# Patient Record
Sex: MALE | Race: WHITE | Employment: OTHER | URBAN - METROPOLITAN AREA
[De-identification: names, ages, dates, MRNs, and addresses within clinical notes are randomized per-mention and may not be internally consistent; named-entity substitution may affect disease eponyms.]

---

## 2017-07-11 LAB
ALT SERPL-CCNC: 85 IU/L (ref 8–45)
ANION GAP SERPL CALCULATED.3IONS-SCNC: 11 MMOL/L (ref 5–18)
AST SERPL-CCNC: 118 IU/L (ref 2–40)
BUN SERPL-MCNC: 26 MG/DL (ref 8–25)
CALCIUM SERPL-MCNC: 7.9 MG/DL (ref 8.5–10.5)
CHLORIDE SERPLBLD-SCNC: 96 MMOL/L (ref 98–110)
CO2 SERPL-SCNC: 23 MMOL/L (ref 21–31)
CREAT SERPL-MCNC: 1 MG/DL (ref 0.72–1.25)
DIFFERENTIAL: ABNORMAL
ERYTHROCYTE [DISTWIDTH] IN BLOOD BY AUTOMATED COUNT: 22.4 % (ref 11.5–15.5)
GFR SERPL CREATININE-BSD FRML MDRD: >60 ML/MIN/1.73M2
GLUCOSE SERPL-MCNC: 117 MG/DL (ref 65–100)
HCT VFR BLD AUTO: 31.5 % (ref 37–53)
HEMOGLOBIN: 9.7 G/DL (ref 13.5–17.5)
INR PPP: 1.3
MCH RBC QN AUTO: 22.5 PG (ref 26–35)
MCHC RBC AUTO-ENTMCNC: 30.8 G/DL (ref 32–36)
MCV RBC AUTO: 73 FL (ref 80–100)
PLATELET # BLD AUTO: 399 THOU/CU MM (ref 140–440)
POTASSIUM SERPL-SCNC: 4.5 MMOL/L (ref 3.5–5)
RBC # BLD AUTO: 4.31 MIL/CU MM (ref 4.3–5.9)
SODIUM SERPL-SCNC: 130 MMOL/L (ref 135–145)
WBC # BLD AUTO: 18.1 THOU/CU MM (ref 4.5–11)

## 2017-07-14 LAB
ALT SERPL-CCNC: 47 IU/L (ref 8–45)
ANION GAP SERPL CALCULATED.3IONS-SCNC: 6 MMOL/L (ref 5–18)
AST SERPL-CCNC: 39 IU/L (ref 2–40)
BUN SERPL-MCNC: 20 MG/DL (ref 8–25)
CALCIUM SERPL-MCNC: 7.4 MG/DL (ref 8.5–10.5)
CHLORIDE SERPLBLD-SCNC: 104 MMOL/L (ref 98–110)
CO2 SERPL-SCNC: 22 MMOL/L (ref 21–31)
CREAT SERPL-MCNC: 0.77 MG/DL (ref 0.72–1.25)
GFR SERPL CREATININE-BSD FRML MDRD: >60 ML/MIN/1.73M2
GLUCOSE SERPL-MCNC: 121 MG/DL (ref 65–100)
POTASSIUM SERPL-SCNC: 4.6 MMOL/L (ref 3.5–5)
SODIUM SERPL-SCNC: 132 MMOL/L (ref 135–145)

## 2017-07-15 LAB
ALT SERPL-CCNC: 76 IU/L (ref 8–45)
ANION GAP SERPL CALCULATED.3IONS-SCNC: 9 MMOL/L (ref 5–18)
AST SERPL-CCNC: 55 IU/L (ref 2–40)
BUN SERPL-MCNC: 18 MG/DL (ref 8–25)
CALCIUM SERPL-MCNC: 7.9 MG/DL (ref 8.5–10.5)
CHLORIDE SERPLBLD-SCNC: 106 MMOL/L (ref 98–110)
CO2 SERPL-SCNC: 20 MMOL/L (ref 21–31)
CREAT SERPL-MCNC: 0.74 MG/DL (ref 0.72–1.25)
DIFFERENTIAL: ABNORMAL
ERYTHROCYTE [DISTWIDTH] IN BLOOD BY AUTOMATED COUNT: 23.5 % (ref 11.5–15.5)
GFR SERPL CREATININE-BSD FRML MDRD: >60 ML/MIN/1.73M2
GLUCOSE SERPL-MCNC: 140 MG/DL (ref 65–100)
HCT VFR BLD AUTO: 35.1 % (ref 37–53)
HEMOGLOBIN: 10.8 G/DL (ref 13.5–14.7)
MCH RBC QN AUTO: 23.1 PG (ref 26–34)
MCHC RBC AUTO-ENTMCNC: 30.8 G/DL (ref 32–36)
MCV RBC AUTO: 75 FL (ref 80–100)
PLATELET # BLD AUTO: 336 THOU/CU MM (ref 140–440)
POTASSIUM SERPL-SCNC: 4.6 MMOL/L (ref 3.5–5)
RBC # BLD AUTO: 4.68 MIL/CU MM (ref 4.3–5.9)
SODIUM SERPL-SCNC: 135 MMOL/L (ref 135–145)
WBC # BLD AUTO: 15.5 THOU/CU MM (ref 4.5–11)

## 2017-07-18 LAB
DIFFERENTIAL: ABNORMAL
ERYTHROCYTE [DISTWIDTH] IN BLOOD BY AUTOMATED COUNT: 23.7 % (ref 11.5–15.5)
HCT VFR BLD AUTO: 33.1 % (ref 37–53)
HEMOGLOBIN: 10.3 G/DL (ref 13.5–17.5)
MCH RBC QN AUTO: 22.6 PG (ref 26–34)
MCHC RBC AUTO-ENTMCNC: 31.1 G/DL (ref 32–36)
MCV RBC AUTO: 73 FL (ref 80–100)
PLATELET # BLD AUTO: 269 THOU/CU MM (ref 140–440)
RBC # BLD AUTO: 4.56 MIL/CU MM (ref 4.3–5.9)
WBC # BLD AUTO: 14.8 THOU/CU MM (ref 4.5–11)

## 2017-07-19 LAB
ALT SERPL-CCNC: 60 IU/L (ref 8–45)
ANION GAP SERPL CALCULATED.3IONS-SCNC: 8 MMOL/L (ref 5–18)
AST SERPL-CCNC: 43 IU/L (ref 2–40)
BUN SERPL-MCNC: 29 MG/DL (ref 8–25)
CALCIUM SERPL-MCNC: 7.5 MG/DL (ref 8.5–10.5)
CHLORIDE SERPLBLD-SCNC: 102 MMOL/L (ref 98–110)
CO2 SERPL-SCNC: 20 MMOL/L (ref 21–31)
CREAT SERPL-MCNC: 0.81 MG/DL (ref 0.72–1.25)
GFR SERPL CREATININE-BSD FRML MDRD: >60 ML/MIN/1.73M2
GLUCOSE SERPL-MCNC: 114 MG/DL (ref 65–100)
POTASSIUM SERPL-SCNC: 4.8 MMOL/L (ref 3.5–5)
SODIUM SERPL-SCNC: 130 MMOL/L (ref 135–145)

## 2017-07-20 LAB
ALT SERPL-CCNC: 51 IU/L (ref 8–45)
ANION GAP SERPL CALCULATED.3IONS-SCNC: 6 MMOL/L (ref 5–18)
AST SERPL-CCNC: 29 IU/L (ref 2–40)
BUN SERPL-MCNC: 31 MG/DL (ref 8–25)
CALCIUM SERPL-MCNC: 7.5 MG/DL (ref 8.5–10.5)
CHLORIDE SERPLBLD-SCNC: 102 MMOL/L (ref 98–110)
CO2 SERPL-SCNC: 22 MMOL/L (ref 21–31)
CREAT SERPL-MCNC: 0.8 MG/DL (ref 0.72–1.25)
DIFFERENTIAL: ABNORMAL
ERYTHROCYTE [DISTWIDTH] IN BLOOD BY AUTOMATED COUNT: 23.8 % (ref 11.5–15.5)
GFR SERPL CREATININE-BSD FRML MDRD: >60 ML/MIN/1.73M2
GLUCOSE SERPL-MCNC: 111 MG/DL (ref 65–100)
HCT VFR BLD AUTO: 28.6 % (ref 37–53)
HEMOGLOBIN: 9.3 G/DL (ref 13.5–17.5)
MCH RBC QN AUTO: 22.8 PG (ref 26–34)
MCHC RBC AUTO-ENTMCNC: 32.5 G/DL (ref 32–36)
MCV RBC AUTO: 70 FL (ref 80–100)
PLATELET # BLD AUTO: 211 THOU/CU MM (ref 140–440)
POTASSIUM SERPL-SCNC: 4.9 MMOL/L (ref 3.5–5)
RBC # BLD AUTO: 4.08 MIL/CU MM (ref 4.3–5.9)
SODIUM SERPL-SCNC: 130 MMOL/L (ref 135–145)
WBC # BLD AUTO: 13.1 THOU/CU MM (ref 4.5–11)

## 2017-07-21 LAB
ALT SERPL-CCNC: 51 IU/L (ref 8–45)
ANION GAP SERPL CALCULATED.3IONS-SCNC: 10 MMOL/L (ref 5–18)
AST SERPL-CCNC: 42 IU/L (ref 2–40)
BUN SERPL-MCNC: 34 MG/DL (ref 8–25)
CALCIUM SERPL-MCNC: 7.5 MG/DL (ref 8.5–10.5)
CHLORIDE SERPLBLD-SCNC: 102 MMOL/L (ref 98–110)
CO2 SERPL-SCNC: 20 MMOL/L (ref 21–31)
CREAT SERPL-MCNC: 0.93 MG/DL (ref 0.72–1.25)
ERYTHROCYTE [DISTWIDTH] IN BLOOD BY AUTOMATED COUNT: 24.1 % (ref 11.5–15.5)
GFR SERPL CREATININE-BSD FRML MDRD: >60 ML/MIN/1.73M2
GLUCOSE SERPL-MCNC: 130 MG/DL (ref 65–100)
HCT VFR BLD AUTO: 29.7 % (ref 37–53)
HEMOGLOBIN: 9.4 G/DL (ref 13.5–17.5)
MCH RBC QN AUTO: 22.7 PG (ref 26–34)
MCHC RBC AUTO-ENTMCNC: 31.6 G/DL (ref 32–36)
MCV RBC AUTO: 72 FL (ref 80–100)
PLATELET # BLD AUTO: 175 THOU/CU MM (ref 140–440)
POTASSIUM SERPL-SCNC: 4.6 MMOL/L (ref 3.5–5)
RBC # BLD AUTO: 4.14 MIL/CU MM (ref 4.3–5.9)
SODIUM SERPL-SCNC: 132 MMOL/L (ref 135–145)
WBC # BLD AUTO: 9.4 THOU/CU MM (ref 4.5–11)

## 2017-07-22 LAB
ANION GAP SERPL CALCULATED.3IONS-SCNC: 12 MMOL/L (ref 5–18)
BUN SERPL-MCNC: 48 MG/DL (ref 8–25)
CALCIUM SERPL-MCNC: 7.6 MG/DL (ref 8.5–10.5)
CHLORIDE SERPLBLD-SCNC: 101 MMOL/L (ref 98–110)
CO2 SERPL-SCNC: 21 MMOL/L (ref 21–31)
CREAT SERPL-MCNC: 1.14 MG/DL (ref 0.72–1.25)
ERYTHROCYTE [DISTWIDTH] IN BLOOD BY AUTOMATED COUNT: 24.6 % (ref 11.5–15.5)
GFR SERPL CREATININE-BSD FRML MDRD: >60 ML/MIN/1.73M2
GLUCOSE SERPL-MCNC: 149 MG/DL (ref 65–100)
HCT VFR BLD AUTO: 30.7 % (ref 37–53)
HEMOGLOBIN: 10.1 G/DL (ref 13.5–17.5)
MCH RBC QN AUTO: 23 PG (ref 26–34)
MCHC RBC AUTO-ENTMCNC: 32.9 G/DL (ref 32–36)
MCV RBC AUTO: 70 FL (ref 80–100)
PLATELET # BLD AUTO: 142 THOU/UL (ref 140–440)
POTASSIUM SERPL-SCNC: 4.2 MMOL/L (ref 3.5–5)
RBC # BLD AUTO: 4.39 MIL/CU MM (ref 4.3–5.9)
SODIUM SERPL-SCNC: 134 MMOL/L (ref 135–145)
WBC # BLD AUTO: 8.8 THOU/CU MM (ref 4.5–11)

## 2017-07-23 LAB
ALT SERPL-CCNC: 75 IU/L (ref 8–45)
ANION GAP SERPL CALCULATED.3IONS-SCNC: 9 MMOL/L (ref 5–18)
AST SERPL-CCNC: 54 IU/L (ref 2–40)
BUN SERPL-MCNC: 56 MG/DL (ref 8–25)
CALCIUM SERPL-MCNC: 7.5 MG/DL (ref 8.5–10.5)
CHLORIDE SERPLBLD-SCNC: 102 MMOL/L (ref 98–110)
CO2 SERPL-SCNC: 21 MMOL/L (ref 21–31)
CREAT SERPL-MCNC: 1.14 MG/DL (ref 0.72–1.25)
DIFFERENTIAL: ABNORMAL
ERYTHROCYTE [DISTWIDTH] IN BLOOD BY AUTOMATED COUNT: 24.7 % (ref 11.5–15.5)
GFR SERPL CREATININE-BSD FRML MDRD: >60 ML/MIN/1.73M2
GLUCOSE SERPL-MCNC: 152 MG/DL (ref 65–100)
HCT VFR BLD AUTO: 30.7 % (ref 37–53)
HEMOGLOBIN: 9.8 G/DL (ref 13.5–17.5)
MCH RBC QN AUTO: 22.4 PG (ref 26–34)
MCHC RBC AUTO-ENTMCNC: 31.9 G/DL (ref 32–36)
MCV RBC AUTO: 70 FL (ref 80–100)
PLATELET # BLD AUTO: 163 THOU/CU MM (ref 140–440)
POTASSIUM SERPL-SCNC: 4.6 MMOL/L (ref 3.5–5)
RBC # BLD AUTO: 4.38 MIL/CU MM (ref 4.3–5.9)
SODIUM SERPL-SCNC: 132 MMOL/L (ref 135–145)
WBC # BLD AUTO: 8.5 THOU/CU MM (ref 4.5–11)

## 2017-07-24 LAB
ANION GAP SERPL CALCULATED.3IONS-SCNC: 9 MMOL/L (ref 5–18)
CHLORIDE SERPLBLD-SCNC: 103 MMOL/L (ref 98–110)
CO2 SERPL-SCNC: 21 MMOL/L (ref 21–31)
CREAT SERPL-MCNC: 1.13 MG/DL (ref 0.72–1.25)
GFR SERPL CREATININE-BSD FRML MDRD: >60 ML/MIN/1.73M2
POTASSIUM SERPL-SCNC: 4.7 MMOL/L (ref 3.5–5)
SODIUM SERPL-SCNC: 133 MMOL/L (ref 135–145)

## 2017-07-25 LAB
ANION GAP SERPL CALCULATED.3IONS-SCNC: 6 MMOL/L (ref 5–18)
CHLORIDE SERPLBLD-SCNC: 103 MMOL/L (ref 98–110)
CO2 SERPL-SCNC: 25 MMOL/L (ref 21–31)
CREAT SERPL-MCNC: 0.96 MG/DL (ref 0.72–1.25)
GFR SERPL CREATININE-BSD FRML MDRD: >60 ML/MIN/1.73M2
POTASSIUM SERPL-SCNC: 4.2 MMOL/L (ref 3.5–5)
SODIUM SERPL-SCNC: 134 MMOL/L (ref 135–145)

## 2017-07-26 LAB
ANION GAP SERPL CALCULATED.3IONS-SCNC: 4 MMOL/L (ref 5–18)
BUN SERPL-MCNC: 40 MG/DL (ref 8–25)
CALCIUM SERPL-MCNC: 7.4 MG/DL (ref 8.5–10.5)
CHLORIDE SERPLBLD-SCNC: 102 MMOL/L (ref 98–110)
CO2 SERPL-SCNC: 29 MMOL/L (ref 21–31)
CREAT SERPL-MCNC: 0.89 MG/DL (ref 0.72–1.25)
DIFFERENTIAL: ABNORMAL
ERYTHROCYTE [DISTWIDTH] IN BLOOD BY AUTOMATED COUNT: 23.9 % (ref 11.5–15.5)
GFR SERPL CREATININE-BSD FRML MDRD: >60 ML/MIN/1.73M2
GLUCOSE SERPL-MCNC: 97 MG/DL (ref 65–100)
HCT VFR BLD AUTO: 25.1 % (ref 37–53)
HEMOGLOBIN: 7.8 G/DL (ref 13.5–17.5)
MCH RBC QN AUTO: 22.6 PG (ref 26–34)
MCHC RBC AUTO-ENTMCNC: 31.1 G/DL (ref 32–36)
MCV RBC AUTO: 73 FL (ref 80–100)
PLATELET # BLD AUTO: 99 THOU/CU MM (ref 140–440)
POTASSIUM SERPL-SCNC: 4 MMOL/L (ref 3.5–5)
RBC # BLD AUTO: 3.45 MIL/CU MM (ref 4.3–5.9)
SODIUM SERPL-SCNC: 135 MMOL/L (ref 135–145)
WBC # BLD AUTO: 3.1 THOU/CU MM (ref 4.5–11)

## 2017-07-28 LAB
ANION GAP SERPL CALCULATED.3IONS-SCNC: 4 MMOL/L (ref 5–18)
BUN SERPL-MCNC: 26 MG/DL (ref 8–25)
CALCIUM SERPL-MCNC: 7.3 MG/DL (ref 8.5–10.5)
CHLORIDE SERPLBLD-SCNC: 103 MMOL/L (ref 98–110)
CO2 SERPL-SCNC: 32 MMOL/L (ref 21–31)
CREAT SERPL-MCNC: 0.83 MG/DL (ref 0.72–1.25)
ERYTHROCYTE [DISTWIDTH] IN BLOOD BY AUTOMATED COUNT: 23.1 % (ref 11.5–15.5)
GFR SERPL CREATININE-BSD FRML MDRD: >60 ML/MIN/1.73M2
GLUCOSE SERPL-MCNC: 96 MG/DL (ref 65–100)
HCT VFR BLD AUTO: 22.6 % (ref 37–53)
HEMOGLOBIN: 7 G/DL (ref 13.5–17.5)
MCH RBC QN AUTO: 22.8 PG (ref 26–34)
MCHC RBC AUTO-ENTMCNC: 31 G/DL (ref 32–36)
MCV RBC AUTO: 74 FL (ref 80–100)
PLATELET # BLD AUTO: 79 THOU/CU MM (ref 140–440)
POTASSIUM SERPL-SCNC: 3.6 MMOL/L (ref 3.5–5)
RBC # BLD AUTO: 3.07 MIL/CU MM (ref 4.3–5.9)
SODIUM SERPL-SCNC: 139 MMOL/L (ref 135–145)
WBC # BLD AUTO: 2.2 THOU/CU MM (ref 4.5–11)

## 2017-07-29 ENCOUNTER — TRANSFERRED RECORDS (OUTPATIENT)
Dept: HEALTH INFORMATION MANAGEMENT | Facility: CLINIC | Age: 82
End: 2017-07-29

## 2017-07-29 LAB
ANION GAP SERPL CALCULATED.3IONS-SCNC: 7 MMOL/L (ref 5–18)
BUN SERPL-MCNC: 21 MG/DL (ref 8–25)
CALCIUM SERPL-MCNC: 7.4 MG/DL (ref 8.5–10.5)
CHLORIDE SERPLBLD-SCNC: 103 MMOL/L (ref 98–110)
CO2 SERPL-SCNC: 29 MMOL/L (ref 21–31)
CREAT SERPL-MCNC: 0.84 MG/DL (ref 0.72–1.25)
DIFFERENTIAL: ABNORMAL
ERYTHROCYTE [DISTWIDTH] IN BLOOD BY AUTOMATED COUNT: 21.5 % (ref 11.5–15.5)
GFR SERPL CREATININE-BSD FRML MDRD: >60 ML/MIN/1.73M2
GLUCOSE SERPL-MCNC: 94 MG/DL (ref 65–100)
HCT VFR BLD AUTO: 28 % (ref 37–53)
HEMOGLOBIN: 8.6 G/DL (ref 13.5–17.5)
MCH RBC QN AUTO: 23.4 PG (ref 26–34)
MCHC RBC AUTO-ENTMCNC: 30.7 G/DL (ref 32–36)
MCV RBC AUTO: 76 FL (ref 80–100)
PLATELET # BLD AUTO: 77 THOU/CU MM (ref 140–440)
POTASSIUM SERPL-SCNC: 3.5 MMOL/L (ref 3.5–5)
RBC # BLD AUTO: 3.67 MIL/CU MM (ref 4.3–5.9)
SODIUM SERPL-SCNC: 139 MMOL/L (ref 135–145)
WBC # BLD AUTO: 3.4 THOU/CU MM (ref 4.5–11)

## 2017-07-31 ENCOUNTER — NURSING HOME VISIT (OUTPATIENT)
Dept: GERIATRICS | Facility: CLINIC | Age: 82
End: 2017-07-31
Payer: COMMERCIAL

## 2017-07-31 VITALS
BODY MASS INDEX: 24.24 KG/M2 | HEART RATE: 100 BPM | WEIGHT: 182.9 LBS | DIASTOLIC BLOOD PRESSURE: 66 MMHG | RESPIRATION RATE: 18 BRPM | OXYGEN SATURATION: 92 % | TEMPERATURE: 98 F | HEIGHT: 73 IN | SYSTOLIC BLOOD PRESSURE: 113 MMHG

## 2017-07-31 DIAGNOSIS — R00.0 SINUS TACHYCARDIA: ICD-10-CM

## 2017-07-31 DIAGNOSIS — D64.9 ANEMIA, UNSPECIFIED TYPE: ICD-10-CM

## 2017-07-31 DIAGNOSIS — D49.6 BRAIN TUMOR (H): ICD-10-CM

## 2017-07-31 DIAGNOSIS — D61.818 PANCYTOPENIA (H): ICD-10-CM

## 2017-07-31 DIAGNOSIS — R65.10 SIRS (SYSTEMIC INFLAMMATORY RESPONSE SYNDROME) (H): ICD-10-CM

## 2017-07-31 DIAGNOSIS — C81.10 NODULAR SCLEROSING HODGKIN'S LYMPHOMA, UNSPECIFIED BODY REGION (H): ICD-10-CM

## 2017-07-31 DIAGNOSIS — C91.10 CLL (CHRONIC LYMPHOCYTIC LEUKEMIA) (H): ICD-10-CM

## 2017-07-31 DIAGNOSIS — E43 SEVERE PROTEIN-CALORIE MALNUTRITION (H): ICD-10-CM

## 2017-07-31 DIAGNOSIS — N28.9 RENAL LESION: ICD-10-CM

## 2017-07-31 DIAGNOSIS — J81.0 ACUTE PULMONARY EDEMA (H): Primary | ICD-10-CM

## 2017-07-31 DIAGNOSIS — R53.81 PHYSICAL DECONDITIONING: ICD-10-CM

## 2017-07-31 PROBLEM — B37.0 CANDIDIASIS OF MOUTH: Status: ACTIVE | Noted: 2017-07-31

## 2017-07-31 PROBLEM — J81.1 PULMONARY EDEMA: Status: ACTIVE | Noted: 2017-07-31

## 2017-07-31 PROCEDURE — 99310 SBSQ NF CARE HIGH MDM 45: CPT | Performed by: NURSE PRACTITIONER

## 2017-07-31 PROCEDURE — 99207 ZZC CDG-CORRECTLY CODED, REVIEWED AND AGREE: CPT | Performed by: NURSE PRACTITIONER

## 2017-07-31 RX ORDER — FLUTICASONE PROPIONATE 50 MCG
1 SPRAY, SUSPENSION (ML) NASAL DAILY
COMMUNITY
End: 2019-08-07

## 2017-08-01 ENCOUNTER — TRANSFERRED RECORDS (OUTPATIENT)
Dept: HEALTH INFORMATION MANAGEMENT | Facility: CLINIC | Age: 82
End: 2017-08-01

## 2017-08-01 ENCOUNTER — NURSING HOME VISIT (OUTPATIENT)
Dept: GERIATRICS | Facility: CLINIC | Age: 82
End: 2017-08-01
Payer: COMMERCIAL

## 2017-08-01 VITALS
TEMPERATURE: 99.1 F | DIASTOLIC BLOOD PRESSURE: 68 MMHG | HEIGHT: 73 IN | BODY MASS INDEX: 23.72 KG/M2 | HEART RATE: 120 BPM | OXYGEN SATURATION: 94 % | WEIGHT: 179 LBS | RESPIRATION RATE: 18 BRPM | SYSTOLIC BLOOD PRESSURE: 117 MMHG

## 2017-08-01 DIAGNOSIS — D64.9 ANEMIA, UNSPECIFIED TYPE: ICD-10-CM

## 2017-08-01 DIAGNOSIS — C91.10 CLL (CHRONIC LYMPHOCYTIC LEUKEMIA) (H): Primary | ICD-10-CM

## 2017-08-01 DIAGNOSIS — D61.818 PANCYTOPENIA (H): ICD-10-CM

## 2017-08-01 DIAGNOSIS — R00.0 SINUS TACHYCARDIA: ICD-10-CM

## 2017-08-01 DIAGNOSIS — C81.10 NODULAR SCLEROSING HODGKIN'S LYMPHOMA, UNSPECIFIED BODY REGION (H): ICD-10-CM

## 2017-08-01 DIAGNOSIS — E87.6 HYPOKALEMIA: ICD-10-CM

## 2017-08-01 LAB
ANION GAP SERPL CALCULATED.3IONS-SCNC: 7 MMOL/L (ref 3–14)
BUN SERPL-MCNC: 17 MG/DL (ref 7–30)
CALCIUM SERPL-MCNC: 7.2 MG/DL (ref 8.5–10.1)
CHLORIDE SERPLBLD-SCNC: 101 MMOL/L (ref 94–109)
CO2 SERPL-SCNC: 30 MMOL/L (ref 20–32)
CREAT SERPL-MCNC: 0.95 MG/DL (ref 0.66–1.25)
ERYTHROCYTE [DISTWIDTH] IN BLOOD BY AUTOMATED COUNT: 21.3 % (ref 10–15)
GFR SERPL CREATININE-BSD FRML MDRD: 76 ML/MIN/1.73M2
GLUCOSE SERPL-MCNC: 91 MG/DL (ref 70–99)
HCT VFR BLD AUTO: 24.1 % (ref 40–53)
HEMOGLOBIN: 7.6 G/DL (ref 13.3–17.7)
MCH RBC QN AUTO: 24.1 PG (ref 26.5–33)
MCHC RBC AUTO-ENTMCNC: 31.5 G/DL (ref 31.5–36.5)
MCV RBC AUTO: 77 FL (ref 78–100)
PLATELET # BLD AUTO: 53 10E9/L (ref 150–450)
POTASSIUM SERPL-SCNC: 3 MMOL/L (ref 3.4–5.3)
RBC # BLD AUTO: 3.15 10E12/L (ref 4.4–5.9)
SODIUM SERPL-SCNC: 138 MMOL/L (ref 133–144)
WBC # BLD AUTO: 2.7 10E9/L (ref 4–11)

## 2017-08-01 PROCEDURE — 99207 ZZC CDG-CORRECTLY CODED, REVIEWED AND AGREE: CPT | Performed by: NURSE PRACTITIONER

## 2017-08-01 PROCEDURE — 99310 SBSQ NF CARE HIGH MDM 45: CPT | Performed by: NURSE PRACTITIONER

## 2017-08-01 RX ORDER — SENNOSIDES 8.6 MG
1 TABLET ORAL 2 TIMES DAILY
COMMUNITY
End: 2019-08-07

## 2017-08-01 RX ORDER — POTASSIUM CHLORIDE 1.5 G/1.58G
20 POWDER, FOR SOLUTION ORAL 2 TIMES DAILY
COMMUNITY
End: 2019-08-07

## 2017-08-01 RX ORDER — POLYETHYLENE GLYCOL 3350 17 G/17G
17 POWDER, FOR SOLUTION ORAL 2 TIMES DAILY PRN
COMMUNITY
End: 2019-08-07

## 2017-08-01 NOTE — PROGRESS NOTES
[facility] [facility]    Chief Complaint   Patient presents with     RECHECK       HPI:    Shaun Patel is a 81 year old  (1935), who is being seen today for an episodic care visit at [facility], [place].  HPI information obtained from: facility chart records, facility staff, patient report and [facility] chart review.    Today's concern is:    CLL (chronic lymphocytic leukemia) (H)  Nodular sclerosing Hodgkin's lymphoma, unspecified body region (H)  H/o CLL, was diagnosed 20 years ago. Recently diagnosed with hodgkin's lymphoma on 7/13. Noted to have pancytopenia, r/t cancer and chemotherapy. Patient noted to have one dose of ABVD IV chemotherapy on 7/20, then developed SIRS. Per patient's son, family has noticed rapid physical decline in the past 6 weeks. States towards the end of hospital stay, patient's appetite and energy levels improved post-blood transfusion. Patient and family admit to noticing decrease in energy and appetite since Sunday. Patient has f/u appt with Oncology on 8/9.     Sinus tachycardia  Noted to have tachycardia during hospital stay. Increased HR 160s on 7/20 after reponse to IV chemotherapy. Patient was then treated with decadron, benadryl, and demerol. Since at TCU: HR ranges 90-120s at rest and increases to 120-140s with activity.     Pancytopenia (H)  Anemia, unspecified type  During hospital stay, patient's Hgb dropped to 7.0 on 7/28. Received blood transfusion on 7/28 and Hgb increased to 8.3 on 7/29. Last Hgb was 7.6 on 8/1. Patient continues to admit to increased fatigue. States he doesn't have any energy and finds it difficult to lift himself out of bed. Patient is able to transfer by self to side of the bed w/o shortness of breath. O2 sats stable on RA.     Hypokalemia  K was 3.0 on 8/1. Patient has been taking lasix 20mg qd d/t peripheral edema, currently not taking potassium supplement.               ALLERGIES: Allopurinol; Simvastatin; and  "Sulfamethoxazole-trimethoprim  Past Medical, Surgical, Family and Social History reviewed and updated in [facility].    Current Outpatient Prescriptions   Medication Sig Dispense Refill     polyethylene glycol (MIRALAX/GLYCOLAX) powder Take 17 g by mouth 2 times daily as needed for constipation       sennosides (SENOKOT) 8.6 MG tablet Take 1 tablet by mouth 2 times daily       potassium chloride (KLOR-CON) 20 MEQ Packet Take 20 mEq by mouth 2 times daily       ACETAMINOPHEN PO Take 650 mg by mouth every 4 hours as needed for pain       Prochlorperazine Maleate (COMPAZINE PO) Take 10 mg by mouth every 6 hours as needed for nausea       FAMOTIDINE PO Take 20 mg by mouth 2 times daily       fluticasone (FLONASE) 50 MCG/ACT spray Spray 1 spray into both nostrils daily       FUROSEMIDE PO Take 20 mg by mouth daily       ROSUVASTATIN CALCIUM PO Take 5 mg by mouth At Bedtime       Medications reviewed:  Medications reconciled to facility chart and changes were made to reflect current medications as identified as above med list. Below are the changes that were made:   Medications stopped since last EPIC medication reconciliation:   There are no discontinued medications.    Medications started since last [facility] medication reconciliation:  No orders of the defined types were placed in this encounter.          REVIEW OF SYSTEMS:  4 point ROS including Respiratory, CV, GI and , other than that noted in the HPI,  is negative      Physical Exam:  /68  Pulse 120  Temp 99.1  F (37.3  C)  Resp 18  Ht 6' 1\" (1.854 m)  Wt 179 lb (81.2 kg)  SpO2 94%  BMI 23.62 kg/m2  GENERAL APPEARANCE:  Alert, in no distress, cooperative, appears fatigued  ENT:  Mouth and posterior oropharynx normal, moist mucous membranes, normal hearing acuity  RESP:  Respiratory effort and palpation of chest normal, no respiratory distress, course breath sounds at bases  CV:  Palpation and auscultation of heart done, regular rate and rhythm, tachycardic, no " murmur, rub, or gallop, +2 pedal pulses, +1/2 peripheral edema  ABDOMEN:  Normal bowel sounds, soft, nontender, no hepatosplenomegaly or other masses, no guarding or rebound  M/S:  Gait and station abnormal, requires assistance with activities, ADLs. Digits and nails normal  SKIN:  Inspection of skin and subcutaneous tissue baseline, Palpation of skin and subcutaneous tissue baseline  NEURO:   Cranial nerves 2-12 are normal tested and grossly at patient's baseline, Examination of sensation by touch normal  PSYCH:  Oriented X 3, memory impaired, cognitive testing to be done in therapy          Lab/Diagnostic data:  CBC RESULTS:   Recent Labs   Lab Test 08/01/17   WBC  2.7*   RBC  3.15*   HGB  7.6*   HCT  24.1*   MCV  77*   MCH  24.1*   MCHC  31.5   RDW  21.3*   PLT  53*     Last Basic Metabolic Panel:  Lab Results   Component Value Date     08/01/2017      Lab Results   Component Value Date    POTASSIUM 3.0 08/01/2017     Lab Results   Component Value Date    CHLORIDE 101 08/01/2017     Lab Results   Component Value Date    ABNER 7.2 08/01/2017     Lab Results   Component Value Date    CO2 30 08/01/2017     Lab Results   Component Value Date    BUN 17 08/01/2017     Lab Results   Component Value Date    CR 0.95 08/01/2017     Lab Results   Component Value Date    GLC 91 08/01/2017           CXR 8/1:   Findings:  Lungs: There is mild hyperinflation  Pleural space: Unremarkable. No pneumothorax.   Heart: Unremarkable. No cardiomegaly.   Mediastinum: Unremarkable.   Bones/joints: Tiny indeterminate nodular density measuring about 5 mm projects over the lateral inferior of the inferior scapula on the right.   Tubes, lines, and devices: There is a right-sided portacatheter with its tip in the superior vena cava.   Impression: Tiny indeterminate nodular density measuring about 5 mm projects over the lateral inferior of the inferior scapula on the right. No acute infiltrate.       EKG 8/1:   Interpretation  1. Sinus  rhythm  2. AV block type I  3. Nonsystematic minor intraventricular block  4. Left QRS axis deviation  5. Abnormal repolarization, possible coronary ischemia      Previous EKG 7/20 at [facility]:   Interpretation Sinus tachycardia  Left axis deviation  Inferior infarct , age undetermined  ST & T wave abnormality, consider lateral ischemia  Abnormal ECG  When compared with ECG of 05-JUL-2017 13:04,  No significant change was found             Assessment/Plan:    (C91.10) CLL (chronic lymphocytic leukemia) (H)  (primary encounter diagnosis)  (C81.10) Nodular sclerosing Hodgkin's lymphoma, unspecified body region (H)  Comment: Newly diagnosed with Hodgkin's lymphoma on 7/13. Prior h/o CLL.   Plan: Check CBC & BMP 8/2.      (R00.0) Sinus tachycardia  Comment: Tachycardia r/t cancer vs chemotherapy.   Plan: EKG 8/1 d/t tachycardia. CXR 8/1 d/t fatigue, fever.    (D61.818) Pancytopenia (H)  (D64.9) Anemia, unspecified type  Comment: Hgb declining r/t cancer vs chemotherapy. No active sx of bleeding.  Plan: Recheck Hgb 8/2.     (E87.6) Hypokalemia  Comment: Decreased d/t use of lasix r/t peripheral edema.   Plan: Give potassium chloride 40meq x 1 dose. Start potassium chloride 20meq packet qd.                 Total time spent with patient visit at the skilled nursing facility was 35 min including patient visit, review of past records, and contact to patient's daughter (Yumiko). Greater than 50% of total time spent with counseling and coordinating care due to pt status    Electronically signed by  CRISELDA Alonzo CNP

## 2017-08-02 ENCOUNTER — NURSING HOME VISIT (OUTPATIENT)
Dept: GERIATRICS | Facility: CLINIC | Age: 82
End: 2017-08-02
Payer: COMMERCIAL

## 2017-08-02 ENCOUNTER — TRANSFERRED RECORDS (OUTPATIENT)
Dept: HEALTH INFORMATION MANAGEMENT | Facility: CLINIC | Age: 82
End: 2017-08-02

## 2017-08-02 VITALS
HEART RATE: 95 BPM | DIASTOLIC BLOOD PRESSURE: 65 MMHG | WEIGHT: 179 LBS | SYSTOLIC BLOOD PRESSURE: 120 MMHG | HEIGHT: 73 IN | TEMPERATURE: 99.3 F | BODY MASS INDEX: 23.72 KG/M2 | OXYGEN SATURATION: 93 % | RESPIRATION RATE: 18 BRPM

## 2017-08-02 DIAGNOSIS — E87.6 HYPOKALEMIA: ICD-10-CM

## 2017-08-02 DIAGNOSIS — C81.10 NODULAR SCLEROSING HODGKIN'S LYMPHOMA, UNSPECIFIED BODY REGION (H): Primary | ICD-10-CM

## 2017-08-02 DIAGNOSIS — D64.9 SYMPTOMATIC ANEMIA: ICD-10-CM

## 2017-08-02 DIAGNOSIS — D61.818 PANCYTOPENIA (H): ICD-10-CM

## 2017-08-02 DIAGNOSIS — R00.0 SINUS TACHYCARDIA: ICD-10-CM

## 2017-08-02 PROBLEM — E43 SEVERE PROTEIN-CALORIE MALNUTRITION (H): Status: ACTIVE | Noted: 2017-08-02

## 2017-08-02 PROBLEM — R53.81 PHYSICAL DECONDITIONING: Status: ACTIVE | Noted: 2017-08-02

## 2017-08-02 PROBLEM — R65.10 SIRS (SYSTEMIC INFLAMMATORY RESPONSE SYNDROME) (H): Status: ACTIVE | Noted: 2017-08-02

## 2017-08-02 LAB
ANION GAP SERPL CALCULATED.3IONS-SCNC: 7 MMOL/L (ref 3–14)
BUN SERPL-MCNC: 18 MG/DL (ref 7–30)
CALCIUM SERPL-MCNC: 6.9 MG/DL (ref 8.5–10.1)
CHLORIDE SERPLBLD-SCNC: 100 MMOL/L (ref 94–109)
CO2 SERPL-SCNC: 30 MMOL/L (ref 20–32)
CREAT SERPL-MCNC: 1.01 MG/DL (ref 0.66–1.25)
ERYTHROCYTE [DISTWIDTH] IN BLOOD BY AUTOMATED COUNT: 21.7 % (ref 10–15)
GFR SERPL CREATININE-BSD FRML MDRD: 71 ML/MIN/1.73M2
GLUCOSE SERPL-MCNC: 90 MG/DL (ref 70–99)
HCT VFR BLD AUTO: 21.3 % (ref 40–53)
HEMOGLOBIN: 6.6 G/DL (ref 13.3–17.7)
MCH RBC QN AUTO: 24.2 PG (ref 26.5–33)
MCHC RBC AUTO-ENTMCNC: 31 G/DL (ref 31.5–36.5)
MCV RBC AUTO: 78 FL (ref 78–100)
PLATELET # BLD AUTO: 72 10E9/L (ref 150–450)
POTASSIUM SERPL-SCNC: 2.9 MMOL/L (ref 3.4–5.3)
RBC # BLD AUTO: 2.73 10E12/L (ref 4.4–5.9)
SODIUM SERPL-SCNC: 137 MMOL/L (ref 133–144)
WBC # BLD AUTO: 2.2 10E9/L (ref 4–11)

## 2017-08-02 PROCEDURE — 99310 SBSQ NF CARE HIGH MDM 45: CPT | Performed by: NURSE PRACTITIONER

## 2017-08-02 PROCEDURE — 99207 ZZC CDG-CORRECTLY CODED, REVIEWED AND AGREE: CPT | Performed by: NURSE PRACTITIONER

## 2017-08-02 RX ORDER — LACTOSE-REDUCED FOOD
8 LIQUID (ML) ORAL 2 TIMES DAILY
COMMUNITY
End: 2019-08-07

## 2017-08-02 NOTE — PROGRESS NOTES
[place] [facility]    Chief Complaint   Patient presents with     RECHECK       HPI:    Shaun Patel is a 81 year old  (1935), who is being seen today for an episodic care visit at [facility].  HPI information obtained from: facility chart records, facility staff, patient report and [facility] chart review.    Today's concern is:    Nodular sclerosing Hodgkin's lymphoma, unspecified body region (H)  Pancytopenia (H)  Symptomatic anemia  Sinus tachycardia  H/o CLL, was diagnosed 20 years ago. Recently diagnosed with hodgkin's lymphoma on 7/13. Noted to have pancytopenia, r/t cancer and chemotherapy. Required blood transfusion 7/28 during hospital stay. Last Hgb was 6.6 on 8/2. HR continues to be elevated 90-120s.     Hypokalemia  K was 3.0 on 8/1, was given 40meq KCl. Currently taking lasix 20mg qd d/t peripheral edema. Recently added potassium chloride 20meq qd. Last K was 2.9 on 8/2.             ALLERGIES: Allopurinol; Simvastatin; and Sulfamethoxazole-trimethoprim  Past Medical, Surgical, Family and Social History reviewed and updated in [facility].    Current Outpatient Prescriptions   Medication Sig Dispense Refill     Nutritional Supplements (ENSURE PLUS) LIQD Take 8 oz by mouth 2 times daily       polyethylene glycol (MIRALAX/GLYCOLAX) powder Take 17 g by mouth 2 times daily as needed for constipation       sennosides (SENOKOT) 8.6 MG tablet Take 1 tablet by mouth 2 times daily       potassium chloride (KLOR-CON) 20 MEQ Packet Take 20 mEq by mouth 2 times daily       ACETAMINOPHEN PO Take 650 mg by mouth every 4 hours as needed for pain       Prochlorperazine Maleate (COMPAZINE PO) Take 10 mg by mouth every 6 hours as needed for nausea       FAMOTIDINE PO Take 20 mg by mouth 2 times daily       fluticasone (FLONASE) 50 MCG/ACT spray Spray 1 spray into both nostrils daily       FUROSEMIDE PO Take 20 mg by mouth daily       ROSUVASTATIN CALCIUM PO Take 5 mg by mouth At Bedtime    "    Medications reviewed:  Medications reconciled to facility chart and changes were made to reflect current medications as identified as above med list. Below are the changes that were made:   Medications stopped since last EPIC medication reconciliation:   There are no discontinued medications.    Medications started since last [facility] medication reconciliation:  Orders Placed This Encounter   Medications     Nutritional Supplements (ENSURE PLUS) LIQD     Sig: Take 8 oz by mouth 2 times daily           REVIEW OF SYSTEMS:  4 point ROS including Respiratory, CV, GI and , other than that noted in the HPI,  is negative      Physical Exam:  /65  Pulse 95  Temp 99.3  F (37.4  C)  Resp 18  Ht 6' 1\" (1.854 m)  Wt 179 lb (81.2 kg)  SpO2 93%  BMI 23.62 kg/m2  GENERAL APPEARANCE:  Alert, in no distress, cooperative, appears fatigued  ENT:  Mouth and posterior oropharynx normal, moist mucous membranes, normal hearing acuity  RESP:  Respiratory effort and palpation of chest normal, no respiratory distress, course breath sounds at bases  CV:  Palpation and auscultation of heart done, regular rate and rhythm, no murmur, rub, or gallop, +2 pedal pulses, +1/2 peripheral edema  ABDOMEN:  Normal bowel sounds, soft, nontender, no hepatosplenomegaly or other masses, no guarding or rebound  M/S:  Gait and station abnormal, requires assistance with activities, ADLs. Digits and nails normal  SKIN:  Inspection of skin and subcutaneous tissue baseline, Palpation of skin and subcutaneous tissue baseline  NEURO:   Cranial nerves 2-12 are normal tested and grossly at patient's baseline, Examination of sensation by touch normal  PSYCH:  Oriented X 3, memory impaired, cognitive testing to be done in therapy      Recent Labs:    CBC RESULTS:   Recent Labs   Lab Test 08/01/17 07/29/17   WBC  2.7*  3.4*   RBC  3.15*  3.67*   HGB  7.6*  8.6*   HCT  24.1*  28.0*   MCV  77*  76*   MCH  24.1*  23.4*   MCHC  31.5  30.7*   RDW  21.3*  21.5* "   PLT  53*  77*       Last Basic Metabolic Panel:  Recent Labs   Lab Test 08/01/17 07/29/17   NA  138  139   POTASSIUM  3.0*  3.5   CHLORIDE  101  103   ABNER  7.2*  7.4*   CO2  30  29   BUN  17  21   CR  0.95  0.84   GLC  91  94       Liver Function Studies -   Recent Labs   Lab Test 07/23/17 07/21/17   AST  54*  42*   ALT  75*  51*                   Assessment/Plan:    (C81.10) Nodular sclerosing Hodgkin's lymphoma, unspecified body region (H)  (primary encounter diagnosis)  (D61.818) Pancytopenia (H)  (D64.9) Symptomatic anemia  (R00.0) Sinus tachycardia  Comment: Newly dx with Hodgkin's lymphoma. Symptomatic anemia associated with increased HR and fatigue. Anemia r/t cancer vs chemotherapy vs other.   Plan: Check BMP & Hgb 8/3. Discussed plan of care with patient's daughter, along with risks/benefits of blood transfusion. Patient's daughter notified Oncology (Dr. Li's office) regarding low Hgb w/sx. Plan for patient to be send to [facility] for blood transfusion.     (E87.6) Hypokalemia  Comment: K continues to be decreased.  Plan: Give additional 40meq potassium chloride packet with dinner.               Total time spent with patient visit at the skilled nursing facility was 35 min including patient visit, review of past records and phone call to patient contact. Greater than 50% of total time spent with counseling and coordinating care due to pt status and critical low Hgb    Electronically signed by  CRISELDA Alonzo CNP

## 2017-08-03 ENCOUNTER — NURSING HOME VISIT (OUTPATIENT)
Dept: GERIATRICS | Facility: CLINIC | Age: 82
End: 2017-08-03
Payer: COMMERCIAL

## 2017-08-03 ENCOUNTER — TRANSFERRED RECORDS (OUTPATIENT)
Dept: HEALTH INFORMATION MANAGEMENT | Facility: CLINIC | Age: 82
End: 2017-08-03

## 2017-08-03 DIAGNOSIS — D64.9 ANEMIA, UNSPECIFIED TYPE: ICD-10-CM

## 2017-08-03 DIAGNOSIS — E43 SEVERE PROTEIN-CALORIE MALNUTRITION (H): ICD-10-CM

## 2017-08-03 DIAGNOSIS — C91.10 CLL (CHRONIC LYMPHOCYTIC LEUKEMIA) (H): ICD-10-CM

## 2017-08-03 DIAGNOSIS — R65.10 SIRS (SYSTEMIC INFLAMMATORY RESPONSE SYNDROME) (H): ICD-10-CM

## 2017-08-03 DIAGNOSIS — D49.6 BRAIN TUMOR (H): ICD-10-CM

## 2017-08-03 DIAGNOSIS — C81.10 NODULAR SCLEROSING HODGKIN'S LYMPHOMA, UNSPECIFIED BODY REGION (H): Primary | ICD-10-CM

## 2017-08-03 LAB
ANION GAP SERPL CALCULATED.3IONS-SCNC: 6 MMOL/L (ref 3–14)
BUN SERPL-MCNC: 15 MG/DL (ref 7–30)
CALCIUM SERPL-MCNC: 7.3 MG/DL (ref 8.5–10.1)
CHLORIDE SERPLBLD-SCNC: 103 MMOL/L (ref 94–109)
CO2 SERPL-SCNC: 28 MMOL/L (ref 20–32)
CREAT SERPL-MCNC: 0.97 MG/DL (ref 0.66–1.25)
GFR SERPL CREATININE-BSD FRML MDRD: 74 ML/MIN/1.73M2
GLUCOSE SERPL-MCNC: 86 MG/DL (ref 70–99)
HEMOGLOBIN: 8.3 G/DL (ref 13.3–17.7)
POTASSIUM SERPL-SCNC: 4.4 MMOL/L (ref 3.4–5.3)
SODIUM SERPL-SCNC: 137 MMOL/L (ref 133–144)

## 2017-08-03 PROCEDURE — 99306 1ST NF CARE HIGH MDM 50: CPT | Performed by: INTERNAL MEDICINE

## 2017-08-03 PROCEDURE — 99207 ZZC CDG-CORRECTLY CODED, REVIEWED AND AGREE: CPT | Performed by: INTERNAL MEDICINE

## 2017-08-04 PROBLEM — D64.9 SYMPTOMATIC ANEMIA: Status: ACTIVE | Noted: 2017-08-04

## 2017-08-04 PROBLEM — E87.6 HYPOKALEMIA: Status: ACTIVE | Noted: 2017-08-04

## 2017-08-04 NOTE — PROGRESS NOTES
Received call from patient's daughter (Yumiko) at approx 1530, concerned regarding patient's change in mental status and change in speech. Discussed patient's status with charge RN, states patient is more confused and has difficultly with word searching. VSS - T 97.5, /65, HR 83, RR 20, O2 96%.     Reviewed findings with patient's daughter. Discussed concern for change in mental status and difficulty with word searching. Possible dx could be r/t brain tumor vs stroke vs cancer. Patient requires imagining to determine cause of change in condition and recommendation for patient to be sent to ED for further evaluation. Patient's daughter requesting to speak with Oncology team. Called Dr. Li's office (JEAN Talamantes) to discuss change in patient's status and recommendation to send to ED. JEAN Talamantes stated she will call [facility] for report and update regarding patient's condition. Discussed with Yumiko recommendation to send patient via ambulance to [facility] ED for further evaluation.     Yumiko declined EMS transport and states family will transport patient to hospital. Discussed risks associated with self transport, Yumiko verbalizes understanding and has signed waiver to self transport patient to hospital.

## 2017-08-05 NOTE — PROGRESS NOTES
[place] [facility]  PRIMARY CARE PROVIDER AND CLINIC:  No primary care provider on file. No primary physician on file.      Pt was seen by Dr Martino on 8/3/17 for an initial TCU visit    HPI:    Shaun Patel is a 81 year old  (1935),admitted to the [facility] from [facility] .  Hospital stay 7/11/17 through 7/29/17.     Admitted to this facility for  rehab, medical management and nursing care.      Hospital course reviewed by me, is as per the hospital d/c summary and NP note.    Presented to hospital on 7/11 with fatigue. 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 PET scan on 7/13 with evidence of extensive metastatic disease. Liver biopsy on 7/13 reveled hodgkin lymphoma. MR thoracic spine on 7/14 showed ubiquitous metastatic neoplasm throughout the marrow of all the visualized vertebrae, without evidence of cord compression. Patient started on ABVD chemotherapy on 7/20.      Hospital course complicated by possible adverse reaction to Bleomycin and pulmonary edema.  Echo 7/21 showed borderline wall thickness and EF of 55-60%.      H/o CLL, was diagnosed 20 years ago. Recently diagnosed with hodgkin's lymphoma on 7/13. Noted to have pancytopenia, r/t cancer and chemotherapy. Pt received PRBC at [facility] yesterday for Hgb 6.6    Pt notes generalized fatigue  He denies chest pain, SOB, palpitations, dizziness  Appetite is fair  He denies cough or SOB      CODE STATUS/ADVANCE DIRECTIVES DISCUSSION:   DNR only  Patient's living condition: lives with spouse    ALLERGIES:Allopurinol; Simvastatin; and Sulfamethoxazole-trimethoprim  PAST MEDICAL HISTORY:  has no past medical history on file.  PAST SURGICAL HISTORY:  has no past surgical history on file.  FAMILY HISTORY: family history is not on file.  SOCIAL HISTORY:          Post Discharge Medication Reconciliation Status:   .  Current Outpatient Prescriptions   Medication Sig Dispense Refill      Nutritional Supplements (ENSURE PLUS) LIQD Take 8 oz by mouth 2 times daily       polyethylene glycol (MIRALAX/GLYCOLAX) powder Take 17 g by mouth 2 times daily as needed for constipation       sennosides (SENOKOT) 8.6 MG tablet Take 1 tablet by mouth 2 times daily       potassium chloride (KLOR-CON) 20 MEQ Packet Take 20 mEq by mouth 2 times daily       ACETAMINOPHEN PO Take 650 mg by mouth every 4 hours as needed for pain       Prochlorperazine Maleate (COMPAZINE PO) Take 10 mg by mouth every 6 hours as needed for nausea       FAMOTIDINE PO Take 20 mg by mouth 2 times daily       fluticasone (FLONASE) 50 MCG/ACT spray Spray 1 spray into both nostrils daily       FUROSEMIDE PO Take 20 mg by mouth daily       ROSUVASTATIN CALCIUM PO Take 5 mg by mouth At Bedtime             ROS:  10 point ROS neg except as noted above    Exam:    GENERAL APPEARANCE:  Sleepy, chronically ill, pale appearing  ENT:  Mouth and posterior oropharynx normal, moist mucous membranes, normal hearing acuity  EYES:  EOM, conjunctivae, lids, pupils and irises normal  NECK:  No adenopathy,masses or thyromegaly  RESP: RR 12, unlabored. Bibasilar crackles  CV:  RRR no M  ABDOMEN:  Soft, non-distended, non-tender  M/S:  No LE edema  SKIN:  Inspection of skin and subcutaneous tissue baseline, Palpation of skin and subcutaneous tissue baseline  NEURO:   Cranial nerves 2-12 are normal tested and grossly at patient's baseline, Examination of sensation by touch normal  PSYCH:  Oriented X 3, sleepy, no gross focal weakness        Lab/Diagnostic data:                          ASSESSMENT/PLAN:    (J81.0) Acute pulmonary edema (H)  (primary encounter diagnosis)  (R65.10) SIRS (systemic inflammatory response syndrome) (H)  Comment:  Acute pulmonary edema r/t fluid overload response after IV chemotherapy. CV status stable  Plan monitor exam, wt, resp status. Continue daily lasix       (C81.10) Nodular sclerosing Hodgkin's lymphoma, unspecified body region  (H)  (C91.10) CLL (chronic lymphocytic leukemia) (H)  Comment: Newly diagnosed with Hodgkin's lymphoma on 7/13. Prior h/o CLL. Anemia likely secondary to chemotherapy  Plan: Monitor CBC, transfuse as clinically indicated, and after disucssion with oncology. Patient to f/u with oncology on 8/9.     (D49.6) Brain tumor (H)  Comment: Newly diagnosed during hospital stay. Etiology unclear  Plan: Speech to eval/treat cognition. Patient to f/u wit Neuro Oncology as directed.t.     (E43) Severe protein-calorie malnutrition  (R53.81) Physical deconditioning  Comment: Ongoing.  Plan: dietary evaluation. Oncology f/u.  Consider appetite stimulant, consider steroid trial    Dickson Martino MD

## 2018-11-30 NOTE — PROGRESS NOTES
"[facility]  PRIMARY CARE PROVIDER AND CLINIC:  No primary care provider on file. No primary physician on file.  Chief Complaint   Patient presents with     Hospital F/U       HPI:    Lula Patel is a 81 year old  (1935),admitted to the [facility] from [facility] .  Hospital stay 7/11/17 through 7/29/17.  Admitted to this facility for  rehab, medical management and nursing care.  HPI information obtained from: facility chart records, facility staff, patient report and [facility] chart review, and care everywhere review from [facility].       No discharge summary available. Per [facility] progress note 7/29:   \"HOSPITAL SUMMARY  LULA PATEL is a 81 y.o. male with a history of CLL, and iron deficiency anemia who was admitted to [facility] on 7/11/2017 with several weeks of fatigue. See H&P for further details.       Presented to hospital on 7/11 with fatigue. 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 PET scan on 7/13 with evidence of extensive metastatic disease. Liver biopsy on 7/13 reveled hodgkin lymphoma. MR thoracic spine on 7/14 showed ubiquitous metastatic neoplasm throughout the marrow of all the visualized vertebrae, without evidence of cord compression. Patient started on ABVD chemotherapy on 7/20.      RRT called due to exhibiting rigors and chills after IV chemo. HR increase to 160's, /77, respiration 30's. Patient received Decadron, Benadryl, and Demerol per oncology. IM called to consult.    CXR 7/21/2017 showed pulmonary edema with trace bilateral pleural effusions. Patient was diuresed with subsequent improvement of shortness of breath, and he was able to be off oxygen later that day with good O2 saturation. Repeat echo 7/21 showed borderline wall thickness and EF of 55-60%.\"          Current issues are:        Acute pulmonary edema (H)  SIRS (systemic inflammatory response syndrome) (H)  Patient admitted to [facility] 7/11-7/29 " for several weeks of fatigue and continued weight loss. Patient developed SIRS after reaction to bleomycin on 7/20. Blood cultures negative. Echo completed 7/21 found EF 55-60%. CXR 7/21 with pulmonary edema, was treated with lasix 20mg BID. Currently taking lasix 20mg qd, denies SOB. Continues to have peripheral edema. Afebrile, denies fever, chills, or night sweats.     Nodular sclerosing Hodgkin's lymphoma, unspecified body region (H)  CLL (chronic lymphocytic leukemia) (H)  H/o CLL, was diagnosed 20 years ago. Recently diagnosed with hodgkin's lymphoma on 7/13. Noted to have pancytopenia, r/t cancer and chemotherapy. Patient noted to have one dose of ABVD IV chemotherapy on 7/20, then developed SIRS as mentioned above. After discussion with Oncology team, plan for patient to attempt rehab and f/u next week for treatment plan.     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 Patient to f/u with Neuro Oncology as directed. Admits to cognitive decline over the past several weeks, cognitive testing to be done in therapy.     Sinus tachycardia  Noted to have tachycardia during hospital stay, HR elevated to 160s on 7/20 after IV chemotherapy. HR since at TCU: 90-120s. Denies heart palpitations, SOB, chest pain.     Pancytopenia (H)  Anemia, unspecified type  Pancytopenia r/t cancer, recent chemotherapy. Hgb was 7.0 on 7/28, patient received 1 unit PRBC and Hgb increase to 8.6 on 7/29. No active sx of bleeding.     Renal lesion  Noted to have left renal mass, likely renal cell carcinoma. Patient reported to refuse surgery and prior ablation.     Severe protein-calorie malnutrition (H)  Physical deconditioning  PTA was living with wife, patient was independent with activities and ADLs prior to hospital stay. Patient is actively participating in therapy sessions. Cognitive testing to be done in therapy. SW following for discharge planning.               CODE STATUS/ADVANCE DIRECTIVES DISCUSSION:   DNR  "only  Patient's living condition: lives with spouse    ALLERGIES:Allopurinol; Simvastatin; and Sulfamethoxazole-trimethoprim  PAST MEDICAL HISTORY:  has no past medical history on file.  PAST SURGICAL HISTORY:  has no past surgical history on file.  FAMILY HISTORY: family history is not on file.  SOCIAL HISTORY:      Post Discharge Medication Reconciliation Status: discharge medications reconciled and changed, per note/orders (see AVS).  Current Outpatient Prescriptions   Medication Sig Dispense Refill     ACETAMINOPHEN PO Take 650 mg by mouth every 4 hours as needed for pain       Prochlorperazine Maleate (COMPAZINE PO) Take 10 mg by mouth every 6 hours as needed for nausea       FAMOTIDINE PO Take 20 mg by mouth 2 times daily       fluticasone (FLONASE) 50 MCG/ACT spray Spray 1 spray into both nostrils daily       FUROSEMIDE PO Take 20 mg by mouth daily       ROSUVASTATIN CALCIUM PO Take 5 mg by mouth At Bedtime             ROS:  10 point ROS of systems including Constitutional, Eyes, Respiratory, Cardiovascular, Gastroenterology, Genitourinary, Integumentary, Muscularskeletal, Psychiatric were all negative except for pertinent positives noted in my HPI.      Exam:  /66  Pulse 100  Temp 98  F (36.7  C)  Resp 18  Ht 6' 1\" (1.854 m)  Wt 182 lb 14.4 oz (83 kg)  SpO2 92%  BMI 24.13 kg/m2  GENERAL APPEARANCE:  Alert, oriented, cooperative  ENT:  Mouth and posterior oropharynx normal, moist mucous membranes, normal hearing acuity  EYES:  EOM, conjunctivae, lids, pupils and irises normal  NECK:  No adenopathy,masses or thyromegaly  RESP:  Respiratory effort and palpation of chest normal, lungs clear to auscultation , no respiratory distress, diminished breath sounds at bases  CV:  Palpation and auscultation of heart done, regular rate and rhythm, no murmur, rub, or gallop, +2 pedal pulses, peripheral edema  ABDOMEN:  Normal bowel sounds, soft, nontender, no hepatosplenomegaly or other masses, no guarding or " rebound  M/S:  Weakness to extremities. Poor hand grasp. Gait and station abnormal, requires assistance with walker. Digits and nails normal  SKIN:  Inspection of skin and subcutaneous tissue baseline, Palpation of skin and subcutaneous tissue baseline  NEURO:   Cranial nerves 2-12 are normal tested and grossly at patient's baseline, Examination of sensation by touch normal  PSYCH:  Oriented X 3, memory impaired , affect and mood normal, cognitive testing to be done in therapy        Lab/Diagnostic data:                          ASSESSMENT/PLAN:    (J81.0) Acute pulmonary edema (H)  (primary encounter diagnosis)  (R65.10) SIRS (systemic inflammatory response syndrome) (H)  Comment:  Acute pulmonary edema r/t fluid overload response after IV chemotherapy. SIRS r/t IV chemotherapy, received one dose of IV chemotherapy on 7/20.   Plan: Check CBC weekly qTuesday. Check BMP 8/1. Continue lasix qd. Therapy to eval/treat peripheral edema. Weights daily. Check vitals TID.     (C81.10) Nodular sclerosing Hodgkin's lymphoma, unspecified body region (H)  (C91.10) CLL (chronic lymphocytic leukemia) (H)  Comment: Newly diagnosed with Hodgkin's lymphoma on 7/13. Prior h/o CLL.   Plan: Patient to f/u with Oncology on 8/9.     (D49.6) Brain tumor (H)  Comment: Newly diagnosed during hospital stay.   Plan: Speech to eval/treat cognition. Patient to f/u wit Neuro Oncology as directed.    (R00.0) Sinus tachycardia  Comment: HR 90-120s. Tachycardia r/t SIRS vs anemia.   Plan: Monitor HR, avoid activity if HR > 120s.    (D61.818) Pancytopenia (H)  (D64.9) Anemia, unspecified type  Comment: Pancytopenia/anemia r/t cancer vs chemotherapy. No active sx of bleeding.   Plan: Monitor CBC.     (N28.9) Renal lesion  Comment: Chronic, patient declined surgery and ablation during hospital stay.  Plan: Monitor creat.     (E43) Severe protein-calorie malnutrition  (R53.81) Physical deconditioning  Comment: Ongoing.  Plan: Add senna-s 1 tab BID. Add  miralax BID prn. Dietary to eval/treat decreased appetite, patient states interested in talking with Oncology regarding starting appetite stimulant. Monitor weights. Encourage active participation in therapy session to increase strength and promote independence in activities and ADLs. Speech to eval/treat cognition. Dietary following. SW following for discharge planning.             Total time spent with patient visit at the skilled nursing facility was 35 min including patient visit and review of past records. Greater than 50% of total time spent with counseling and coordinating care due to pt status    Electronically signed by:  CRISELDA Alonzo CNP                 Statement Selected

## 2019-08-07 ENCOUNTER — NURSING HOME VISIT (OUTPATIENT)
Dept: GERIATRICS | Facility: CLINIC | Age: 84
End: 2019-08-07
Payer: COMMERCIAL

## 2019-08-07 VITALS
TEMPERATURE: 97.3 F | SYSTOLIC BLOOD PRESSURE: 124 MMHG | OXYGEN SATURATION: 98 % | HEART RATE: 123 BPM | DIASTOLIC BLOOD PRESSURE: 82 MMHG | HEIGHT: 71 IN | WEIGHT: 212.2 LBS | BODY MASS INDEX: 29.71 KG/M2 | RESPIRATION RATE: 18 BRPM

## 2019-08-07 DIAGNOSIS — C71.9 ASTROCYTOMA (H): ICD-10-CM

## 2019-08-07 DIAGNOSIS — D64.9 ANEMIA, UNSPECIFIED TYPE: ICD-10-CM

## 2019-08-07 DIAGNOSIS — C91.10 CLL (CHRONIC LYMPHOCYTIC LEUKEMIA) (H): ICD-10-CM

## 2019-08-07 DIAGNOSIS — C81.10 NODULAR SCLEROSING HODGKIN'S LYMPHOMA, UNSPECIFIED BODY REGION (H): ICD-10-CM

## 2019-08-07 DIAGNOSIS — L02.214 ABSCESS OF GROIN, LEFT: Primary | ICD-10-CM

## 2019-08-07 DIAGNOSIS — K62.5 RECTAL BLEEDING: ICD-10-CM

## 2019-08-07 DIAGNOSIS — R00.0 SINUS TACHYCARDIA: ICD-10-CM

## 2019-08-07 DIAGNOSIS — Z71.89 ADVANCED DIRECTIVES, COUNSELING/DISCUSSION: ICD-10-CM

## 2019-08-07 DIAGNOSIS — L03.818 CELLULITIS OF OTHER SPECIFIED SITE: ICD-10-CM

## 2019-08-07 DIAGNOSIS — R06.02 SHORTNESS OF BREATH: ICD-10-CM

## 2019-08-07 DIAGNOSIS — R53.81 PHYSICAL DECONDITIONING: ICD-10-CM

## 2019-08-07 DIAGNOSIS — R60.0 BILATERAL LEG EDEMA: ICD-10-CM

## 2019-08-07 PROCEDURE — 99310 SBSQ NF CARE HIGH MDM 45: CPT | Performed by: NURSE PRACTITIONER

## 2019-08-07 RX ORDER — DEXAMETHASONE 2 MG/1
2 TABLET ORAL 2 TIMES DAILY WITH MEALS
COMMUNITY

## 2019-08-07 RX ORDER — METOPROLOL SUCCINATE 25 MG/1
12.5 TABLET, EXTENDED RELEASE ORAL DAILY
COMMUNITY

## 2019-08-07 RX ORDER — NYSTATIN 100000 [USP'U]/G
POWDER TOPICAL 2 TIMES DAILY
COMMUNITY
End: 2019-08-21 | Stop reason: ALTCHOICE

## 2019-08-07 ASSESSMENT — MIFFLIN-ST. JEOR: SCORE: 1679.66

## 2019-08-07 NOTE — LETTER
"    8/7/2019        RE: Shaun Patel  [address]  [facility] [address]        [facility]  PRIMARY CARE PROVIDER AND CLINIC:  Jazmine Rachel, [facility] [address]  / [facility] [address]  Chief Complaint   Patient presents with     Hospital F/U     [facility] Medical Record Number:  [id number]  Place of Service where encounter took place:  [facility] [[id number]]    Shaun Patel  is a 83 year old  (1935), admitted to the above facility from  [facility] . Hospital stay 7/30/2019 through 8/6/2019. Admitted to this facility for  rehab, medical management and nursing care.    HPI:    HPI information obtained from: facility chart records, facility staff, patient report, [facility] chart review, Care Everywhere Epic chart review and family/first contact daughter Yumiko Gonzales report.   Brief Summary of Hospital Course  He has a complex medical history including CLL, astrocytoma of frontal lobe, nodular sclerosing Hodgkin's lymphoma,  liposarcoma of shoulder, history of focal seizure, chronic anemia,GERD,  hospitalization 6/2019 with  pneumonia, and was hospitalized after presenting to the ED with left groin pain. He was septic and found to have left  inguinal abscess and cellulitis. Urology consulted and he underwent I&D 7/31/2019 by Dr Bauer. Tolerated the procedure well. Cultures grew Enterococcus faecalis, Enterococcus species and E coli. ID consulted. He was treated with IV vancomycin,  cefepime and flagyl, then changed to Unasyn. Discharged on Augmentin for 9 days.   GI consulted for rectal bleeding and history of hemorrhoids. Patient declined work up and Hgb remained stable in the 9s.   His oncologist, Dr Parry, consulted and his note indicates that none of the patient's cancers are currently active.       Updates on Status Since Skilled nursing Admission:      Abscess of groin, left-reports feeling \"fine.\" Denies pain of any type. " Good appetite. He tends to minimize his medical issues.   Cellulitis of other specified site  Anemia, unspecified type  Rectal bleeding-denies recent rectal bleeding. No constipation or abdominal pain.   Shortness of breath-reports ongoing cough and shortness of breath following his hospitalization last month for pneumonia. He had extensive work up, including bronchoscopy which grew CMV. Recently completed treatment with valganciclovir. Reports the cough has  improved, but he remains short of breath with activity and conversation. He reports a  hoarse voice since surgery. Daughter reports his voice has been intermittently hoarse prior to this hospitalization.   CXR 7/30/2019 was clear.   Bilateral leg edema-was started on lasix and lymphedema wraps per his PCP on 7/25/2019. Reports some improvement. Denies pain of his legs.   Doppler US of LLE 6/6/2019 was negative.   ECHO 2017 with EF 55-60%  CLL (chronic lymphocytic leukemia) (H)-diagnosed 2000  Nodular sclerosing Hodgkin's lymphoma, unspecified body region (H)-diagnosed 2017  Astrocytoma (H)-also diagnosed 2017  Sinus tachycardia-HR:   BPs: 145/88, 122/78, 116/80  Physical deconditioning-ambulating short distances with walker and max assist. Requires assist of 1-2 with cares.   Per daughter-he was ambulatory and able to mow the lawn after his hospitalization last month. His wife possibly has dementia and is not able to assist with cares. They have had home care in the past and he was also on hospice for a period of time in 2017.     CODE STATUS/ADVANCE DIRECTIVES DISCUSSION:   DNR/DNI  Patient's living condition: lives with spouse  ALLERGIES: Allopurinol; Simvastatin; and Sulfamethoxazole-trimethoprim  PAST MEDICAL HISTORY:  has a past medical history of Astrocytoma (H) (2017), Chronic anemia, CLL (chronic lymphocytic leukemia) (H) (2000), Focal seizure (H) (2017), GERD (gastroesophageal reflux disease), Liposarcoma of left shoulder (H), and Lymphoma (H)  "(2017).  PAST SURGICAL HISTORY:   has no past surgical history on file.  FAMILY HISTORY: family history is not on file.  SOCIAL HISTORY:       Post Discharge Medication Reconciliation Status: discharge medications reconciled, continue medications without change    Current Outpatient Medications   Medication Sig Dispense Refill     amoxicillin-clavulanate (AUGMENTIN) 875-125 MG tablet Take 1 tablet by mouth 2 times daily Take with meals for 9 days       Cadexomer Iodine, topical, 0.9% (IODOSORB) 0.9 % GEL gel Apply topically daily For Cellulitis of scrotum. Apply topically to affected area(s) every morning.       dexamethasone (DECADRON) 2 MG tablet Take 2 mg by mouth 3 times daily       FAMOTIDINE PO Take 10 mg by mouth 2 times daily        FUROSEMIDE PO Take 20 mg by mouth daily       metoprolol succinate ER (TOPROL-XL) 25 MG 24 hr tablet Take 12.5 mg by mouth daily Take 0.5 tablets by mouth once daily       nystatin (MYCOSTATIN) 077378 UNIT/GM external powder Apply topically 2 times daily For Cellulitis of scrotum. Apply topically to affected area(s) 2 times daily.         ROS:  10 point ROS of systems including Constitutional, Eyes, Respiratory, Cardiovascular, Gastroenterology, Genitourinary, Integumentary, Musculoskeletal, Psychiatric were all negative except for pertinent positives noted in my HPI.    Vitals:  /82   Pulse 123   Temp 97.3  F (36.3  C)   Resp 18   Ht 1.803 m (5' 11\")   Wt 96.3 kg (212 lb 3.2 oz)   SpO2 98%   BMI 29.60 kg/m     Exam:  GENERAL APPEARANCE:  Alert, in no distress  ENT:  Mouth and posterior oropharynx normal, moist mucous membranes, [place]  EYES:  EOM normal, conjunctiva and lids normal, PERRL  NECK:  No adenopathy,masses or thyromegaly  RESP:  respiratory effort and palpation of chest normal, lungs clear to auscultation , no respiratory distress  CV:  Palpation and auscultation of heart done , regular rate and rhythm, no  murmur, +2 pedal pulses, peripheral edema 2 + in " both LE  ABDOMEN:  normal bowel sounds, soft, nontender, no hepatosplenomegaly or other masses  M/S:   in bed. [place] with good strength. No joint inflammation  SKIN:  left groin wound approx 4 x 4 cm with partial slough, surrounding induration and mild erythema. Mild erythema of skin folds.  Bruising across lower abdomen   PSYCH:  oriented X 3, memory impaired , affect and mood normal    Lab/Diagnostic data:  Recent labs in [facility] reviewed by me today.    8/5/2019: WBC 13.5, Hgb 9.9, platelet 171      ASSESSMENT / PLAN:  (L02.214) Abscess of groin, left  (primary encounter diagnosis)  (L03.818) Cellulitis of other specified site  Comment: s/p I&D. Infection appears to be improving, though area of induration is significant. No pain   Plan: continue Augmentin for total of 9 days. Continue nystatin. Tylenol prn. Daily wound care as ordered with iodosorb and foam dressing. Follow up with Dr Bauer prn. CBC    (D64.9) Anemia, unspecified type  Comment: acute on chronic. No signs of active bleeding   Plan: CBC    (K62.5) Rectal bleeding  Comment: appears resolved. Related to hemorrhoids, per his report. He declined GI work up  Plan: monitor symptoms. Follow Hgb.   Continue famotidine for GERD.     (R06.02) Shortness of breath  Comment: multifactorial with recent pneumonia, volume overload and deconditioning all contributing.   Plan: continue lasix. Closely monitor respiratory status. Repeat CXR if not improved. SPEECH THERAPY to eval for hoarse voice.  Encourage IS.     (R60.0) Bilateral leg edema  Comment: likely related to IVF and inactivity.   Plan: therapy for lymphedema management. Continue lasix. BMP. Consider bilateral Doppler if not improved.     (C91.90) CLL (chronic lymphocytic leukemia) (H)  (C81.10) Nodular sclerosing Hodgkin's lymphoma, unspecified body region (H)  (C71.9) Astrocytoma (H)  Comment: cancers are not currently active.   Plan: continue decadron for brain tumor. Follow up with Dr Parry per usual  schedule.     (R00.0) Sinus tachycardia  Comment: rate controlled, BPs acceptable  Plan: continue metoprolol. Monitor VS    (R53.81) Physical deconditioning  Comment: due to acute illness, recent hospitalizations and multiple comorbidities   Plan: PHYSICAL THERAPY/OT. Goal is to return home with services. Daughter is aware they may need AL in the near future.     (Z71.89) Advanced directives, counseling/discussion  Comment: he and his daughter confirm DNR/DNI.  Plan: POLST completed and orders updated.         Total time spent with patient visit at the skilled nursing facility was 45 min including patient visit, review of past records and  phone call to patient contact. Greater than 50% of total time spent with counseling and coordinating care due to coordinating care with facility staff on admission orders, coordinating care with follow up labs and appointments and counseling patient/resident and daughter for 35  minutes on:  reason for hospitalization and the treatment,  plan of care and projected length of SNF stay, current medications (treatments) reconciled from the hospital, recent  lab and imaging results and subsequent treatment plan and current pain control plan. Counseling re: advanced directive and completing POLST.     Electronically signed by:  CRISELDA Cohen CNP                         Sincerely,        CRISELDA Cohen CNP

## 2019-08-07 NOTE — PROGRESS NOTES
"[facility]  PRIMARY CARE PROVIDER AND CLINIC:  Jazmine Rachel, [facility] [address]  / [facility] [id number]  Chief Complaint   Patient presents with     Hospital F/U     [facility] Record Number:  [id number]  Place of Service where encounter took place:  [facility] [[id number]]    Shaun Patel  is a 83 year old  (1935), admitted to the above facility from  [facility] . Hospital stay 7/30/2019 through 8/6/2019. Admitted to this facility for  rehab, medical management and nursing care.    HPI:    HPI information obtained from: facility chart records, facility staff, patient report, [facility] chart review, Care Everywhere Epic chart review and family/first contact daughter Yumiko Gonzales report.   Brief Summary of Hospital Course  He has a complex medical history including CLL, astrocytoma of frontal lobe, nodular sclerosing Hodgkin's lymphoma,  liposarcoma of shoulder, history of focal seizure, chronic anemia,GERD,  hospitalization 6/2019 with  pneumonia, and was hospitalized after presenting to the ED with left groin pain. He was septic and found to have left  inguinal abscess and cellulitis. Urology consulted and he underwent I&D 7/31/2019 by Dr Bauer. Tolerated the procedure well. Cultures grew Enterococcus faecalis, Enterococcus species and E coli. ID consulted. He was treated with IV vancomycin,  cefepime and flagyl, then changed to Unasyn. Discharged on Augmentin for 9 days.   GI consulted for rectal bleeding and history of hemorrhoids. Patient declined work up and Hgb remained stable in the 9s.   His oncologist, Dr Parry, consulted and his note indicates that none of the patient's cancers are currently active.       Updates on Status Since Skilled nursing Admission:      Abscess of groin, left-reports feeling \"fine.\" Denies pain of any type. Good appetite. He tends to minimize his medical issues.   Cellulitis of other " specified site  Anemia, unspecified type  Rectal bleeding-denies recent rectal bleeding. No constipation or abdominal pain.   Shortness of breath-reports ongoing cough and shortness of breath following his hospitalization last month for pneumonia. He had extensive work up, including bronchoscopy which grew CMV. Recently completed treatment with valganciclovir. Reports the cough has  improved, but he remains short of breath with activity and conversation. He reports a  hoarse voice since surgery. Daughter reports his voice has been intermittently hoarse prior to this hospitalization.   CXR 7/30/2019 was clear.   Bilateral leg edema-was started on lasix and lymphedema wraps per his PCP on 7/25/2019. Reports some improvement. Denies pain of his legs.   Doppler US of LLE 6/6/2019 was negative.   ECHO 2017 with EF 55-60%  CLL (chronic lymphocytic leukemia) (H)-diagnosed 2000  Nodular sclerosing Hodgkin's lymphoma, unspecified body region (H)-diagnosed 2017  Astrocytoma (H)-also diagnosed 2017  Sinus tachycardia-HR:   BPs: 145/88, 122/78, 116/80  Physical deconditioning-ambulating short distances with walker and max assist. Requires assist of 1-2 with cares.   Per daughter-he was ambulatory and able to mow the lawn after his hospitalization last month. His wife possibly has dementia and is not able to assist with cares. They have had home care in the past and he was also on hospice for a period of time in 2017.     CODE STATUS/ADVANCE DIRECTIVES DISCUSSION:   DNR/DNI  Patient's living condition: lives with spouse  ALLERGIES: Allopurinol; Simvastatin; and Sulfamethoxazole-trimethoprim  PAST MEDICAL HISTORY:  has a past medical history of Astrocytoma (H) (2017), Chronic anemia, CLL (chronic lymphocytic leukemia) (H) (2000), Focal seizure (H) (2017), GERD (gastroesophageal reflux disease), Liposarcoma of left shoulder (H), and Lymphoma (H) (2017).  PAST SURGICAL HISTORY:   has no past surgical history on file.  FAMILY  "HISTORY: family history is not on file.  SOCIAL HISTORY:       Post Discharge Medication Reconciliation Status: discharge medications reconciled, continue medications without change    Current Outpatient Medications   Medication Sig Dispense Refill     amoxicillin-clavulanate (AUGMENTIN) 875-125 MG tablet Take 1 tablet by mouth 2 times daily Take with meals for 9 days       Cadexomer Iodine, topical, 0.9% (IODOSORB) 0.9 % GEL gel Apply topically daily For Cellulitis of scrotum. Apply topically to affected area(s) every morning.       dexamethasone (DECADRON) 2 MG tablet Take 2 mg by mouth 3 times daily       FAMOTIDINE PO Take 10 mg by mouth 2 times daily        FUROSEMIDE PO Take 20 mg by mouth daily       metoprolol succinate ER (TOPROL-XL) 25 MG 24 hr tablet Take 12.5 mg by mouth daily Take 0.5 tablets by mouth once daily       nystatin (MYCOSTATIN) 010576 UNIT/GM external powder Apply topically 2 times daily For Cellulitis of scrotum. Apply topically to affected area(s) 2 times daily.         ROS:  10 point ROS of systems including Constitutional, Eyes, Respiratory, Cardiovascular, Gastroenterology, Genitourinary, Integumentary, Musculoskeletal, Psychiatric were all negative except for pertinent positives noted in my HPI.    Vitals:  /82   Pulse 123   Temp 97.3  F (36.3  C)   Resp 18   Ht 1.803 m (5' 11\")   Wt 96.3 kg (212 lb 3.2 oz)   SpO2 98%   BMI 29.60 kg/m    Exam:  GENERAL APPEARANCE:  Alert, in no distress  ENT:  Mouth and posterior oropharynx normal, moist mucous membranes, [place]  EYES:  EOM normal, conjunctiva and lids normal, PERRL  NECK:  No adenopathy,masses or thyromegaly  RESP:  respiratory effort and palpation of chest normal, lungs clear to auscultation , no respiratory distress  CV:  Palpation and auscultation of heart done , regular rate and rhythm, no  murmur, +2 pedal pulses, peripheral edema 2 + in both LE  ABDOMEN:  normal bowel sounds, soft, nontender, no hepatosplenomegaly or " other masses  M/S:   in bed. MUHAMMAD with good strength. No joint inflammation  SKIN:  left groin wound approx 4 x 4 cm with partial slough, surrounding induration and mild erythema. Mild erythema of skin folds.  Bruising across lower abdomen   PSYCH:  oriented X 3, memory impaired , affect and mood normal    Lab/Diagnostic data:  Recent labs in [facility] reviewed by me today.    8/5/2019: WBC 13.5, Hgb 9.9, platelet 171      ASSESSMENT / PLAN:  (L02.214) Abscess of groin, left  (primary encounter diagnosis)  (L03.818) Cellulitis of other specified site  Comment: s/p I&D. Infection appears to be improving, though area of induration is significant. No pain   Plan: continue Augmentin for total of 9 days. Continue nystatin. Tylenol prn. Daily wound care as ordered with iodosorb and foam dressing. Follow up with Dr Bauer prn. CBC    (D64.9) Anemia, unspecified type  Comment: acute on chronic. No signs of active bleeding   Plan: CBC    (K62.5) Rectal bleeding  Comment: appears resolved. Related to hemorrhoids, per his report. He declined GI work up  Plan: monitor symptoms. Follow Hgb.   Continue famotidine for GERD.     (R06.02) Shortness of breath  Comment: multifactorial with recent pneumonia, volume overload and deconditioning all contributing.   Plan: continue lasix. Closely monitor respiratory status. Repeat CXR if not improved. SPEECH THERAPY to eval for hoarse voice.  Encourage IS.     (R60.0) Bilateral leg edema  Comment: likely related to IVF and inactivity.   Plan: therapy for lymphedema management. Continue lasix. BMP. Consider bilateral Doppler if not improved.     (C91.90) CLL (chronic lymphocytic leukemia) (H)  (C81.10) Nodular sclerosing Hodgkin's lymphoma, unspecified body region (H)  (C71.9) Astrocytoma (H)  Comment: cancers are not currently active.   Plan: continue decadron for brain tumor. Follow up with Dr Parry per usual schedule.     (R00.0) Sinus tachycardia  Comment: rate controlled, BPs  acceptable  Plan: continue metoprolol. Monitor VS    (R53.81) Physical deconditioning  Comment: due to acute illness, recent hospitalizations and multiple comorbidities   Plan: PHYSICAL THERAPY/OT. Goal is to return home with services. Daughter is aware they may need AL in the near future.     (Z71.89) Advanced directives, counseling/discussion  Comment: he and his daughter confirm DNR/DNI.  Plan: POLST completed and orders updated.         Total time spent with patient visit at the skilled nursing facility was 45 min including patient visit, review of past records and  phone call to patient contact. Greater than 50% of total time spent with counseling and coordinating care due to coordinating care with facility staff on admission orders, coordinating care with follow up labs and appointments and counseling patient/resident and daughter for 35  minutes on:  reason for hospitalization and the treatment,  plan of care and projected length of SNF stay, current medications (treatments) reconciled from the hospital, recent  lab and imaging results and subsequent treatment plan and current pain control plan. Counseling re: advanced directive and completing POLST.     Electronically signed by:  CRISELDA Cohen CNP

## 2019-08-08 ENCOUNTER — HOSPITAL LABORATORY (OUTPATIENT)
Dept: OTHER | Facility: CLINIC | Age: 84
End: 2019-08-08

## 2019-08-08 ENCOUNTER — NURSING HOME VISIT (OUTPATIENT)
Dept: GERIATRICS | Facility: CLINIC | Age: 84
End: 2019-08-08
Payer: COMMERCIAL

## 2019-08-08 VITALS
HEART RATE: 95 BPM | OXYGEN SATURATION: 97 % | HEIGHT: 71 IN | TEMPERATURE: 97.1 F | WEIGHT: 212.2 LBS | SYSTOLIC BLOOD PRESSURE: 115 MMHG | RESPIRATION RATE: 18 BRPM | BODY MASS INDEX: 29.71 KG/M2 | DIASTOLIC BLOOD PRESSURE: 73 MMHG

## 2019-08-08 DIAGNOSIS — R60.0 BILATERAL LEG EDEMA: ICD-10-CM

## 2019-08-08 DIAGNOSIS — L03.818 CELLULITIS OF OTHER SPECIFIED SITE: ICD-10-CM

## 2019-08-08 DIAGNOSIS — C81.10 NODULAR SCLEROSING HODGKIN'S LYMPHOMA, UNSPECIFIED BODY REGION (H): ICD-10-CM

## 2019-08-08 DIAGNOSIS — L02.214 ABSCESS OF GROIN, LEFT: ICD-10-CM

## 2019-08-08 DIAGNOSIS — C91.10 CLL (CHRONIC LYMPHOCYTIC LEUKEMIA) (H): ICD-10-CM

## 2019-08-08 DIAGNOSIS — K86.89 PANCREATIC MASS: ICD-10-CM

## 2019-08-08 DIAGNOSIS — D64.9 ANEMIA, UNSPECIFIED TYPE: ICD-10-CM

## 2019-08-08 DIAGNOSIS — C71.9 ASTROCYTOMA (H): ICD-10-CM

## 2019-08-08 DIAGNOSIS — R53.81 PHYSICAL DECONDITIONING: Primary | ICD-10-CM

## 2019-08-08 LAB
ANION GAP SERPL CALCULATED.3IONS-SCNC: 11 MMOL/L (ref 3–14)
BUN SERPL-MCNC: 25 MG/DL (ref 7–30)
CALCIUM SERPL-MCNC: 7.9 MG/DL (ref 8.5–10.1)
CHLORIDE SERPL-SCNC: 102 MMOL/L (ref 94–109)
CO2 SERPL-SCNC: 25 MMOL/L (ref 20–32)
CREAT SERPL-MCNC: 1.1 MG/DL (ref 0.66–1.25)
ERYTHROCYTE [DISTWIDTH] IN BLOOD BY AUTOMATED COUNT: 18.2 % (ref 10–15)
GFR SERPL CREATININE-BSD FRML MDRD: 61 ML/MIN/{1.73_M2}
GLUCOSE SERPL-MCNC: 344 MG/DL (ref 70–99)
HCT VFR BLD AUTO: 36.7 % (ref 40–53)
HGB BLD-MCNC: 11.9 G/DL (ref 13.3–17.7)
MCH RBC QN AUTO: 33.1 PG (ref 26.5–33)
MCHC RBC AUTO-ENTMCNC: 32.4 G/DL (ref 31.5–36.5)
MCV RBC AUTO: 102 FL (ref 78–100)
PLATELET # BLD AUTO: 170 10E9/L (ref 150–450)
POTASSIUM SERPL-SCNC: 4 MMOL/L (ref 3.4–5.3)
RBC # BLD AUTO: 3.59 10E12/L (ref 4.4–5.9)
SODIUM SERPL-SCNC: 138 MMOL/L (ref 133–144)
WBC # BLD AUTO: 26.6 10E9/L (ref 4–11)

## 2019-08-08 PROCEDURE — 99306 1ST NF CARE HIGH MDM 50: CPT | Performed by: INTERNAL MEDICINE

## 2019-08-08 ASSESSMENT — MIFFLIN-ST. JEOR: SCORE: 1679.66

## 2019-08-08 NOTE — LETTER
8/8/2019        RE: Shaun Patel  [address]  [facility] [address]        [facility]  INITIAL VISIT NOTE  August 8, 2019    PRIMARY CARE PROVIDER AND CLINIC:  [facility] [address]  / [facility] [address]    Chief Complaint   Patient presents with     Hospital F/U       HPI:    Shaun Patel is a 83 year old  (1935) male who was seen at [facility] on August 8, 2019 for an initial visit. Medical history is notable for CLL, Hodgkin's lymphoma, seizure, liposarcoma of left shoulder, anemia, lower extremity edema, astrocytoma of frontal lobe, liver masses, renal tumor, hemorrhoids, vitamin D deficiency, PJP and CMV pneumonia, and hyperlipidemia.     He was hospitalized at [facility] from July 30 through August 6, 2019 for left inguinal abscess and abscess.  There was a started on cefepime, vancomycin, and metronidazole.  He was seen by urology and underwent debridement on July 31, 2019 without complication.  Culture grew Enterococcus faecalis and E. coli.  Antibiotic was changed to Unasyn on August 4 and he was discharged on oral Augmentin for an additional 9 days.    He had been also hospitalized at [facility] from Katie 3 through June 12, 2019 for suspected pneumonia.  Bronchoscopy on June 7 with PJP on PCR, however cytology was negative for pneumocystis.  His bronchoscopy did ultimately grow CMV.  He completed courses of primaquine, clindamycin, and valacyclovir.    He is admitted to this facility for medical management and rehab.     At this juncture the patient is doing well.  He reports no fever, chills, chest pain, dyspnea, nausea, vomiting, abdominal pain, or diarrhea.  The groin wound is healing well.  He denies any pain.    CODE STATUS:   DNR / DNI    PAST MEDICAL HISTORY:   Past Medical History:   Diagnosis Date    Hodgkin's lymphoma (H)      Astrocytoma, frontal lobe (H) 2017     Chronic anemia      CLL (chronic  lymphocytic leukemia) (H)      Focal seizure (H) 2017     GERD (gastroesophageal reflux disease)      Liposarcoma of left shoulder (H)     Left inguinal abscess     Demand ischemia of myocardium     Anemia     Hemorrhoid     Presumed renal cell carcinoma     Hypercholesterolemia     Rosacea     Actinic keratosis     Shingles     Kidney stone     History of PJP and CMV pneumonia     Essential tremor        PAST SURGICAL HISTORY:   Hemorrhoidectomy  Cholecystectomy, laparoscopic  Mastectomy  [place] teeth extraction  Wide local excision of left shoulder lesion for liposarcoma  Left inguinal I&D for abscess    FAMILY HISTORY:   One brother with a history of liver transplant, another brother with a history of CLL.  Father  at the age of 55 following a machinery accident complicated by chest infection after operation.  Mother  at the age of 52 from brain bleed following a fall.    SOCIAL HISTORY:   Patient is .  He is a never smoker.  He drinks beer occasionally.  He does use a walker for ambulation.    MEDICATIONS:  Current Outpatient Medications   Medication Sig Dispense Refill     amoxicillin-clavulanate (AUGMENTIN) 875-125 MG tablet Take 1 tablet by mouth 2 times daily Take with meals for 9 days       Cadexomer Iodine, topical, 0.9% (IODOSORB) 0.9 % GEL gel Apply topically daily For Cellulitis of scrotum. Apply topically to affected area(s) every morning.       dexamethasone (DECADRON) 2 MG tablet Take 2 mg by mouth 3 times daily       FAMOTIDINE PO Take 10 mg by mouth 2 times daily        FUROSEMIDE PO Take 20 mg by mouth daily       metoprolol succinate ER (TOPROL-XL) 25 MG 24 hr tablet Take 12.5 mg by mouth daily Take 0.5 tablets by mouth once daily       nystatin (MYCOSTATIN) 004698 UNIT/GM external powder Apply topically 2 times daily For Cellulitis of scrotum. Apply topically to affected area(s) 2 times daily.         ALLERGIES:     Allergies   Allergen Reactions     Allopurinol       "Simvastatin      Sulfamethoxazole-Trimethoprim        ROS:  10 point ROS neg other than the symptoms noted above in the HPI.    PHYSICAL EXAM:  /73   Pulse 95   Temp 97.1  F (36.2  C)   Resp 18   Ht 1.803 m (5' 11\")   Wt 96.3 kg (212 lb 3.2 oz)   SpO2 97%   BMI 29.60 kg/m      Gen: Sitting in bed, alert, cooperative and in no acute distress  HEENT: Normocephalic; oropharynx clear  Card: RRR, S1, S2, grade 2/6 systolic murmur, best audible left upper sternal border  Resp: Lungs clear to auscultation bilaterally  GI: Abdomen soft, not-tender, non-distended, +BS  MSK: Normal muscle tone, no LE edema  Neuro: CX II-XII grossly in tact; ROM in all four extremities grossly in tact  Psych: Alert and oriented x3; normal affect    LABORATORY/IMAGING DATA:  Lab Results   Component Value Date    WBC 26.6 08/08/2019     Lab Results   Component Value Date    RBC 3.59 08/08/2019     Lab Results   Component Value Date    HGB 11.9 08/08/2019     Lab Results   Component Value Date    HCT 36.7 08/08/2019     Lab Results   Component Value Date     08/08/2019     Lab Results   Component Value Date    MCH 33.1 08/08/2019     Lab Results   Component Value Date    MCHC 32.4 08/08/2019     Lab Results   Component Value Date    RDW 18.2 08/08/2019     Lab Results   Component Value Date     08/08/2019     Last Comprehensive Metabolic Panel:  Sodium   Date Value Ref Range Status   08/08/2019 138 133 - 144 mmol/L Final     Potassium   Date Value Ref Range Status   08/08/2019 4.0 3.4 - 5.3 mmol/L Final     Chloride   Date Value Ref Range Status   08/08/2019 102 94 - 109 mmol/L Final     Carbon Dioxide   Date Value Ref Range Status   08/08/2019 25 20 - 32 mmol/L Final     Anion Gap   Date Value Ref Range Status   08/08/2019 11 3 - 14 mmol/L Final     Glucose   Date Value Ref Range Status   08/08/2019 344 (H) 70 - 99 mg/dL Final     Urea Nitrogen   Date Value Ref Range Status   08/08/2019 25 7 - 30 mg/dL Final "     Creatinine   Date Value Ref Range Status   08/08/2019 1.10 0.66 - 1.25 mg/dL Final     GFR Estimate   Date Value Ref Range Status   08/08/2019 61 >60 mL/min/[1.73_m2] Final     Comment:     Non  GFR Calc  Starting 12/18/2018, serum creatinine based estimated GFR (eGFR) will be   calculated using the Chronic Kidney Disease Epidemiology Collaboration   (CKD-EPI) equation.       Calcium   Date Value Ref Range Status   08/08/2019 7.9 (L) 8.5 - 10.1 mg/dL Final       ASSESSMENT/PLAN:  Physical deconditioning.   We will follow recommendations by PT/OT. Goal is to return home with services.    Left groin abscess with necrotizing cellulitis, s/p debridement on 07/31/2019.  Patient underwent debridement on July 31, 2019 without complication.  Culture grew Enterococcus faecalis and E. coli.  He was treated with broad-spectrum antibiotic therapy, but discharged on oral Augmentin for an additional 9 days.  He is to follow-up with urology as needed.  We will continue with daily wound care as instructed with Iodosorb and foam dressing.  We will have PRN analgesics available.    Anemia, unspecified type.  Acute on chronic due to recent rectal bleed.  Hemoglobin is currently stable at 11.9 . We will continue to monitor his hemoglobin intermittently.      Sinus tachycardia.  Started recently on beta-blocker for sinus tachycardia.   I suspect his tachycardia is reactive to hypermetabolic state due to underlying malignancy and recent infection.  For now I will continue with prior  metoprolol XL 12.5 mg p.o. daily.    Bilateral leg edema.  Likely related to possibly venous insufficiency and recent IV fluid therapy.  Continue with lymphedema wrap and furosemide.      Pancreatic lesions.  noted on CT scan incidentally on 07/30 showing 2 cystic lesions, question neoplasm/IPMN vs pseudocyst.  This should be further evaluated as outpatient.    CLL (chronic lymphocytic leukemia),  History of nodular sclerosing Hodgkin's  lymphoma, unspecified body region,  Liposarcoma left shoulder.  Patient has chronic leukocytosis likely due to CLL which will be monitored intermittently.  He needs to follow-up with his primary oncologist after discharge from TCU.    History of frontal lobe brain astrocytoma.  We will continue Decadron.Follow up with Dr. Parry per usual schedule.     GERD.  Chronic. Continue prior to admission famotidine.    Recent rectal bleed due to hemorrhoids.  Hemoglobin is stable.  He will be closely monitored for recurrence of bleed.      Electronically signed by:  Andriy Castellano MD                      Sincerely,        Andriy Castellano MD

## 2019-08-08 NOTE — PROGRESS NOTES
[facility]  INITIAL VISIT NOTE  August 8, 2019    PRIMARY CARE PROVIDER AND CLINIC:  Jazmine Rachel [facility] [address] [place] [address] / [facility] [address]    Chief Complaint   Patient presents with     Hospital F/U       HPI:    Shanu Patel is a 83 year old  (1935) male who was seen at [facility] on August 8, 2019 for an initial visit. Medical history is notable for CLL, Hodgkin's lymphoma, seizure, liposarcoma of left shoulder, anemia, lower extremity edema, astrocytoma of frontal lobe, liver masses, renal tumor, hemorrhoids, vitamin D deficiency, PJP and CMV pneumonia, and hyperlipidemia.     He was hospitalized at [facility] from July 30 through August 6, 2019 for left inguinal abscess and abscess.  There was a started on cefepime, vancomycin, and metronidazole.  He was seen by urology and underwent debridement on July 31, 2019 without complication.  Culture grew Enterococcus faecalis and E. coli.  Antibiotic was changed to Unasyn on August 4 and he was discharged on oral Augmentin for an additional 9 days.    He had been also hospitalized at [facility] from Katie 3 through June 12, 2019 for suspected pneumonia.  Bronchoscopy on June 7 with PJP on PCR, however cytology was negative for pneumocystis.  His bronchoscopy did ultimately grow CMV.  He completed courses of primaquine, clindamycin, and valacyclovir.    He is admitted to this facility for medical management and rehab.     At this juncture the patient is doing well.  He reports no fever, chills, chest pain, dyspnea, nausea, vomiting, abdominal pain, or diarrhea.  The groin wound is healing well.  He denies any pain.    CODE STATUS:   DNR / DNI    PAST MEDICAL HISTORY:   Past Medical History:   Diagnosis Date    Hodgkin's lymphoma (H)      Astrocytoma, frontal lobe (H) 2017     Chronic anemia      CLL (chronic lymphocytic leukemia) (H) 2000     Focal seizure (H) 2017     GERD (gastroesophageal  reflux disease)      Liposarcoma of left shoulder (H)     Left inguinal abscess     Demand ischemia of myocardium     Anemia     Hemorrhoid     Presumed renal cell carcinoma     Hypercholesterolemia     Rosacea     Actinic keratosis     Shingles     Kidney stone     History of PJP and CMV pneumonia     Essential tremor        PAST SURGICAL HISTORY:   Hemorrhoidectomy  Cholecystectomy, laparoscopic  Mastectomy  [place] teeth extraction  Wide local excision of left shoulder lesion for liposarcoma  Left inguinal I&D for abscess    FAMILY HISTORY:   One brother with a history of liver transplant, another brother with a history of CLL.  Father  at the age of 55 following a machinery accident complicated by chest infection after operation.  Mother  at the age of 52 from brain bleed following a fall.    SOCIAL HISTORY:   Patient is .  He is a never smoker.  He drinks beer occasionally.  He does use a walker for ambulation.    MEDICATIONS:  Current Outpatient Medications   Medication Sig Dispense Refill     amoxicillin-clavulanate (AUGMENTIN) 875-125 MG tablet Take 1 tablet by mouth 2 times daily Take with meals for 9 days       Cadexomer Iodine, topical, 0.9% (IODOSORB) 0.9 % GEL gel Apply topically daily For Cellulitis of scrotum. Apply topically to affected area(s) every morning.       dexamethasone (DECADRON) 2 MG tablet Take 2 mg by mouth 3 times daily       FAMOTIDINE PO Take 10 mg by mouth 2 times daily        FUROSEMIDE PO Take 20 mg by mouth daily       metoprolol succinate ER (TOPROL-XL) 25 MG 24 hr tablet Take 12.5 mg by mouth daily Take 0.5 tablets by mouth once daily       nystatin (MYCOSTATIN) 129367 UNIT/GM external powder Apply topically 2 times daily For Cellulitis of scrotum. Apply topically to affected area(s) 2 times daily.         ALLERGIES:     Allergies   Allergen Reactions     Allopurinol      Simvastatin      Sulfamethoxazole-Trimethoprim        ROS:  10 point ROS neg other than the  "symptoms noted above in the HPI.    PHYSICAL EXAM:  /73   Pulse 95   Temp 97.1  F (36.2  C)   Resp 18   Ht 1.803 m (5' 11\")   Wt 96.3 kg (212 lb 3.2 oz)   SpO2 97%   BMI 29.60 kg/m     Gen: Sitting in bed, alert, cooperative and in no acute distress  HEENT: Normocephalic; oropharynx clear  Card: RRR, S1, S2, grade 2/6 systolic murmur, best audible left upper sternal border  Resp: Lungs clear to auscultation bilaterally  GI: Abdomen soft, not-tender, non-distended, +BS  MSK: Normal muscle tone, no LE edema  Neuro: CX II-XII grossly in tact; ROM in all four extremities grossly in tact  Psych: Alert and oriented x3; normal affect    LABORATORY/IMAGING DATA:  Lab Results   Component Value Date    WBC 26.6 08/08/2019     Lab Results   Component Value Date    RBC 3.59 08/08/2019     Lab Results   Component Value Date    HGB 11.9 08/08/2019     Lab Results   Component Value Date    HCT 36.7 08/08/2019     Lab Results   Component Value Date     08/08/2019     Lab Results   Component Value Date    MCH 33.1 08/08/2019     Lab Results   Component Value Date    MCHC 32.4 08/08/2019     Lab Results   Component Value Date    RDW 18.2 08/08/2019     Lab Results   Component Value Date     08/08/2019     Last Comprehensive Metabolic Panel:  Sodium   Date Value Ref Range Status   08/08/2019 138 133 - 144 mmol/L Final     Potassium   Date Value Ref Range Status   08/08/2019 4.0 3.4 - 5.3 mmol/L Final     Chloride   Date Value Ref Range Status   08/08/2019 102 94 - 109 mmol/L Final     Carbon Dioxide   Date Value Ref Range Status   08/08/2019 25 20 - 32 mmol/L Final     Anion Gap   Date Value Ref Range Status   08/08/2019 11 3 - 14 mmol/L Final     Glucose   Date Value Ref Range Status   08/08/2019 344 (H) 70 - 99 mg/dL Final     Urea Nitrogen   Date Value Ref Range Status   08/08/2019 25 7 - 30 mg/dL Final     Creatinine   Date Value Ref Range Status   08/08/2019 1.10 0.66 - 1.25 mg/dL Final     GFR Estimate "   Date Value Ref Range Status   08/08/2019 61 >60 mL/min/[1.73_m2] Final     Comment:     Non  GFR Calc  Starting 12/18/2018, serum creatinine based estimated GFR (eGFR) will be   calculated using the Chronic Kidney Disease Epidemiology Collaboration   (CKD-EPI) equation.       Calcium   Date Value Ref Range Status   08/08/2019 7.9 (L) 8.5 - 10.1 mg/dL Final       ASSESSMENT/PLAN:  Physical deconditioning.   We will follow recommendations by PT/OT. Goal is to return home with services.    Left groin abscess with necrotizing cellulitis, s/p debridement on 07/31/2019.  Patient underwent debridement on July 31, 2019 without complication.  Culture grew Enterococcus faecalis and E. coli.  He was treated with broad-spectrum antibiotic therapy, but discharged on oral Augmentin for an additional 9 days.  He is to follow-up with urology as needed.  We will continue with daily wound care as instructed with Iodosorb and foam dressing.  We will have PRN analgesics available.    Anemia, unspecified type.  Acute on chronic due to recent rectal bleed.  Hemoglobin is currently stable at 11.9 . We will continue to monitor his hemoglobin intermittently.      Sinus tachycardia.  Started recently on beta-blocker for sinus tachycardia.   I suspect his tachycardia is reactive to hypermetabolic state due to underlying malignancy and recent infection.  For now I will continue with prior  metoprolol XL 12.5 mg p.o. daily.    Bilateral leg edema.  Likely related to possibly venous insufficiency and recent IV fluid therapy.  Continue with lymphedema wrap and furosemide.      Pancreatic lesions.  noted on CT scan incidentally on 07/30 showing 2 cystic lesions, question neoplasm/IPMN vs pseudocyst.  This should be further evaluated as outpatient.    CLL (chronic lymphocytic leukemia),  History of nodular sclerosing Hodgkin's lymphoma, unspecified body region,  Liposarcoma left shoulder.  Patient has chronic leukocytosis likely  due to CLL which will be monitored intermittently.  He needs to follow-up with his primary oncologist after discharge from TCU.    History of frontal lobe brain astrocytoma.  We will continue Decadron.Follow up with Dr. Parry per usual schedule.     GERD.  Chronic. Continue prior to admission famotidine.    Recent rectal bleed due to hemorrhoids.  Hemoglobin is stable.  He will be closely monitored for recurrence of bleed.      Electronically signed by:  Andriy Castellano MD

## 2019-08-12 ENCOUNTER — HOSPITAL LABORATORY (OUTPATIENT)
Dept: OTHER | Facility: CLINIC | Age: 84
End: 2019-08-12

## 2019-08-12 ENCOUNTER — NURSING HOME VISIT (OUTPATIENT)
Dept: GERIATRICS | Facility: CLINIC | Age: 84
End: 2019-08-12
Payer: COMMERCIAL

## 2019-08-12 VITALS
HEART RATE: 114 BPM | DIASTOLIC BLOOD PRESSURE: 90 MMHG | BODY MASS INDEX: 29.54 KG/M2 | RESPIRATION RATE: 16 BRPM | HEIGHT: 71 IN | WEIGHT: 211 LBS | SYSTOLIC BLOOD PRESSURE: 136 MMHG | OXYGEN SATURATION: 98 % | TEMPERATURE: 98.4 F

## 2019-08-12 DIAGNOSIS — C91.10 CLL (CHRONIC LYMPHOCYTIC LEUKEMIA) (H): ICD-10-CM

## 2019-08-12 DIAGNOSIS — L02.214 ABSCESS OF GROIN, LEFT: Primary | ICD-10-CM

## 2019-08-12 DIAGNOSIS — C81.10 NODULAR SCLEROSING HODGKIN'S LYMPHOMA, UNSPECIFIED BODY REGION (H): ICD-10-CM

## 2019-08-12 DIAGNOSIS — R53.81 PHYSICAL DECONDITIONING: ICD-10-CM

## 2019-08-12 DIAGNOSIS — R00.0 SINUS TACHYCARDIA: ICD-10-CM

## 2019-08-12 DIAGNOSIS — C71.9 ASTROCYTOMA (H): ICD-10-CM

## 2019-08-12 DIAGNOSIS — D64.9 ANEMIA, UNSPECIFIED TYPE: ICD-10-CM

## 2019-08-12 DIAGNOSIS — B37.0 ORAL THRUSH: ICD-10-CM

## 2019-08-12 DIAGNOSIS — R06.02 SHORTNESS OF BREATH: ICD-10-CM

## 2019-08-12 DIAGNOSIS — K62.5 RECTAL BLEEDING: ICD-10-CM

## 2019-08-12 DIAGNOSIS — L03.818 CELLULITIS OF OTHER SPECIFIED SITE: ICD-10-CM

## 2019-08-12 DIAGNOSIS — R60.0 BILATERAL LEG EDEMA: ICD-10-CM

## 2019-08-12 LAB
ERYTHROCYTE [DISTWIDTH] IN BLOOD BY AUTOMATED COUNT: 18 % (ref 10–15)
HCT VFR BLD AUTO: 34.5 % (ref 40–53)
HGB BLD-MCNC: 11.4 G/DL (ref 13.3–17.7)
MCH RBC QN AUTO: 33.2 PG (ref 26.5–33)
MCHC RBC AUTO-ENTMCNC: 33 G/DL (ref 31.5–36.5)
MCV RBC AUTO: 101 FL (ref 78–100)
PLATELET # BLD AUTO: 127 10E9/L (ref 150–450)
RBC # BLD AUTO: 3.43 10E12/L (ref 4.4–5.9)
WBC # BLD AUTO: 20.4 10E9/L (ref 4–11)

## 2019-08-12 PROCEDURE — 99309 SBSQ NF CARE MODERATE MDM 30: CPT | Performed by: NURSE PRACTITIONER

## 2019-08-12 ASSESSMENT — MIFFLIN-ST. JEOR: SCORE: 1674.22

## 2019-08-12 NOTE — LETTER
8/12/2019        RE: Shaun Patel  [address]  [facility] [address]        [facility]  [facility] Record Number:  [id number]  Place of Service where encounter took place:  [facility] [[id number]]  Chief Complaint   Patient presents with     RECHECK       HPI:    Shaun Patel  is a 83 year old (1935), who is being seen today for an episodic care visit.  HPI information obtained from: facility chart records, facility staff, patient report and [facility] chart review.   He came to this facility 8/6/2019 from [facility] for short term rehab and medical management following hospitalization after presenting to the ED with left groin pain. He was septic and found to have left  inguinal abscess and cellulitis. Urology consulted and he underwent I&D 7/31/2019 by Dr Bauer. Cultures grew Enterococcus faecalis, Enterococcus species and E coli. ID consulted. He was treated with IV vancomycin,  cefepime and flagyl, then changed to Unasyn. Discharged on Augmentin for 9 days.   GI consulted for rectal bleeding and history of hemorrhoids. Patient declined work up and Hgb remained stable in the 9s.   His oncologist, Dr Parry, consulted and his note indicates that none of the patient's cancers are currently active.     Today's concern is:     Abscess of groin, left-reports feeling well with improved strength. Minimal pain. Afebrile.   Cellulitis of other specified site  Anemia, unspecified type  Rectal bleeding-denies signs of bleeding.   Shortness of breath-reports his shortness of breath has improved. No cough or chest pain.   Bilateral leg edema  CLL (chronic lymphocytic leukemia) (H)  Nodular sclerosing Hodgkin's lymphoma, unspecified body region (H)  Astrocytoma (H)  Sinus tachycardia-HR: 87-96   BPs: 119/78, 134/84, 122/79  Oral thrush-reports pain of his mouth and is concerned that he's getting thrush, which he's had in the past  Physical deconditioning-ambulating  "short distances with walker and assist of 1. Requires assist of 1 with transfers and cares.     Past Medical and Surgical History reviewed in Epic today.    MEDICATIONS:  Current Outpatient Medications   Medication Sig Dispense Refill     amoxicillin-clavulanate (AUGMENTIN) 875-125 MG tablet Take 1 tablet by mouth 2 times daily Take with meals for 9 days       dexamethasone (DECADRON) 2 MG tablet Take 2 mg by mouth 3 times daily       FAMOTIDINE PO Take 10 mg by mouth 2 times daily        FUROSEMIDE PO Take 20 mg by mouth daily       metoprolol succinate ER (TOPROL-XL) 25 MG 24 hr tablet Take 12.5 mg by mouth daily Take 0.5 tablets by mouth once daily       nystatin (MYCOSTATIN) 086019 UNIT/GM external powder Apply topically 2 times daily For Cellulitis of scrotum. Apply topically to affected area(s) 2 times daily.         REVIEW OF SYSTEMS:  4 point ROS including Respiratory, CV, GI and , other than that noted in the HPI,  is negative    Objective:  BP (!) 136/90   Pulse 114   Temp 98.4  F (36.9  C)   Resp 16   Ht 1.803 m (5' 11\")   Wt 95.7 kg (211 lb)   SpO2 98%   BMI 29.43 kg/m     Exam:  GENERAL APPEARANCE:  Alert, in no distress  ENT:   few small white patches on oral mucosa, open sore left side of tongue, moist mucous membranes, [place]  EYES:  EOM normal, conjunctiva and lids normal, PERRL  NECK:  No adenopathy,masses or thyromegaly  RESP:  respiratory effort and palpation of chest normal, lungs clear to auscultation , no respiratory distress  CV:  Palpation and auscultation of heart done , regular rate and rhythm, no  murmur, +2 pedal pulses, peripheral edema 1 + in both LE  ABDOMEN:   soft, nontender, no hepatosplenomegaly or other masses  M/S:   gait slow with walker.  MUHAMMAD with good strength. No joint inflammation  SKIN:  left groin wound approx 4 x 4 cm, dry with partial slough, no drainage, mild induration. Fading bruising across lower abdomen   PSYCH:  oriented X 3, memory impaired , affect and " mood normal    Labs:   Labs done in SNF are in [facility] EPIC. Please refer to them using EPIC/Care Everywhere.    ASSESSMENT / PLAN:  (L02.214) Abscess of groin, left  (primary encounter diagnosis)  (L03.818) Cellulitis of other specified site  Comment: infection appears to be improving and indurated area is softer.  Wound bed is dry.   Plan: discontinue iodoform and start hydrogel with foam dressing every other day. Complete course of Augmentin 8/15/2019. Follow up with Dr Bauer if wound does not improve.     (D64.9) Anemia, unspecified type  Comment: acute on chronic. Hgb improved    Plan: follow Hgb    (K62.5) Rectal bleeding  Comment: no signs of active bleeding   Plan: monitor. He declined further work up.     (R06.02) Shortness of breath  Comment: improving. Multifactorial with recent pneumonia, volume overload and deconditioning all contributing.   Plan: continue lasix. Closely monitor respiratory status. Repeat CXR if not improved. Encourage IS     (R60.0) Bilateral leg edema  Comment: improving   Plan: continue lasix. Compression stockings. Follow BMP    (C91.90) CLL (chronic lymphocytic leukemia) (H)  (C81.10) Nodular sclerosing Hodgkin's lymphoma, unspecified body region (H)  (C71.9) Astrocytoma (H)  Comment: no acute issues. Blood counts are stable  Plan: continue decadron for brain tumor. Follow up with Dr Parry per usual schedule.     (R00.0) Sinus tachycardia  Comment: rate controlled. No hypotension   Plan: continue metoprolol. Monitor VS     (B37.0) Oral thrush  Comment: mild. At risk for recurrent thrush due to decadron use and chronic immunosuppression   Plan: nystatin solution for 7 days. Good oral hygiene.     (R53.81) Physical deconditioning  Comment: progressing in therapies  Plan: continue PHYSICAL THERAPY/OT. Goal is to return home with his wife and services.         Electronically signed by  CRISELDA Cohen CNP               Sincerely,        CRISELDA Cohen CNP

## 2019-08-15 ENCOUNTER — NURSING HOME VISIT (OUTPATIENT)
Dept: GERIATRICS | Facility: CLINIC | Age: 84
End: 2019-08-15
Payer: COMMERCIAL

## 2019-08-15 VITALS
RESPIRATION RATE: 18 BRPM | WEIGHT: 211 LBS | DIASTOLIC BLOOD PRESSURE: 77 MMHG | HEIGHT: 71 IN | HEART RATE: 95 BPM | OXYGEN SATURATION: 95 % | SYSTOLIC BLOOD PRESSURE: 125 MMHG | BODY MASS INDEX: 29.54 KG/M2 | TEMPERATURE: 97.3 F

## 2019-08-15 DIAGNOSIS — L02.214 ABSCESS OF GROIN, LEFT: Primary | ICD-10-CM

## 2019-08-15 DIAGNOSIS — B37.0 ORAL THRUSH: ICD-10-CM

## 2019-08-15 DIAGNOSIS — L03.818 CELLULITIS OF OTHER SPECIFIED SITE: ICD-10-CM

## 2019-08-15 DIAGNOSIS — K62.5 RECTAL BLEEDING: ICD-10-CM

## 2019-08-15 DIAGNOSIS — D64.9 ANEMIA, UNSPECIFIED TYPE: ICD-10-CM

## 2019-08-15 DIAGNOSIS — R53.81 PHYSICAL DECONDITIONING: ICD-10-CM

## 2019-08-15 DIAGNOSIS — C71.9 ASTROCYTOMA (H): ICD-10-CM

## 2019-08-15 DIAGNOSIS — C91.10 CLL (CHRONIC LYMPHOCYTIC LEUKEMIA) (H): ICD-10-CM

## 2019-08-15 DIAGNOSIS — R00.0 SINUS TACHYCARDIA: ICD-10-CM

## 2019-08-15 DIAGNOSIS — C81.10 NODULAR SCLEROSING HODGKIN'S LYMPHOMA, UNSPECIFIED BODY REGION (H): ICD-10-CM

## 2019-08-15 DIAGNOSIS — R60.0 BILATERAL LEG EDEMA: ICD-10-CM

## 2019-08-15 DIAGNOSIS — R06.02 SHORTNESS OF BREATH: ICD-10-CM

## 2019-08-15 PROCEDURE — 99309 SBSQ NF CARE MODERATE MDM 30: CPT | Performed by: NURSE PRACTITIONER

## 2019-08-15 RX ORDER — NYSTATIN 100000/ML
500000 SUSPENSION, ORAL (FINAL DOSE FORM) ORAL 4 TIMES DAILY
COMMUNITY
Start: 2019-08-12 | End: 2019-08-21

## 2019-08-15 ASSESSMENT — MIFFLIN-ST. JEOR: SCORE: 1674.22

## 2019-08-15 NOTE — PROGRESS NOTES
[facility]  [facility] Record Number:  [id number]  Place of Service where encounter took place:  [facility] [[id number]]  Chief Complaint   Patient presents with     RECHECK       HPI:    Shaun Patel  is a 83 year old (1935), who is being seen today for an episodic care visit.  HPI information obtained from: facility chart records, facility staff, patient report and [facility] chart review.   He came to this facility 8/6/2019 from [facility] for short term rehab and medical management following hospitalization after presenting to the ED with left groin pain. He was septic and found to have left  inguinal abscess and cellulitis. Urology consulted and he underwent I&D 7/31/2019 by Dr Bauer. Cultures grew Enterococcus faecalis, Enterococcus species and E coli. ID consulted. He was treated with IV vancomycin,  cefepime and flagyl, then changed to Unasyn. Discharged on Augmentin for 9 days. GI consulted for rectal bleeding and history of hemorrhoids. Patient declined work up and Hgb remained stable in the 9s.   His oncologist, Dr Parry, consulted and his note indicates that none of the patient's cancers are currently active.     Today's concerns are:      Abscess of groin, left-reports minimal pain. Afebrile. Feeling stronger. Wound care was changed to hydrogel earlier this week as the wound bed appeared dry.   Cellulitis of other specified site  Anemia, unspecified type  Rectal bleeding-no signs of bleeding.   Shortness of breath-reports this has improved. No cough or chest pain.   Bilateral leg edema  CLL (chronic lymphocytic leukemia) (H)  Nodular sclerosing Hodgkin's lymphoma, unspecified body region (H)  Astrocytoma (H)  Sinus tachycardia-HR: 95-99, outlier 113    BPs: 125/79, 119/76, 122/74  Oral thrush-reports mouth pain is better since starting nystatin. Good appetite.   Physical deconditioning-ambulating with walker and contact guard assist. Requires assist of  "1 with transfers and cares.     Past Medical and Surgical History reviewed in Epic today.    MEDICATIONS:  Current Outpatient Medications   Medication Sig Dispense Refill     Cadexomer Iodine, topical, 0.9% (IODOSORB) 0.9 % GEL gel Apply topically every morning Apply to scrotum topically in the morning for cellulits of the scrotum       dexamethasone (DECADRON) 2 MG tablet Take 2 mg by mouth 3 times daily       FAMOTIDINE PO Take 10 mg by mouth 2 times daily        FUROSEMIDE PO Take 20 mg by mouth daily       metoprolol succinate ER (TOPROL-XL) 25 MG 24 hr tablet Take 12.5 mg by mouth daily Take 0.5 tablets by mouth once daily       nystatin (MYCOSTATIN) 409320 UNIT/GM external powder Apply topically 2 times daily For Cellulitis of scrotum. Apply topically to affected area(s) 2 times daily.       nystatin (MYCOSTATIN) 052884 UNIT/ML suspension Take 500,000 Units by mouth 4 times daily         REVIEW OF SYSTEMS:  4 point ROS including Respiratory, CV, GI and , other than that noted in the HPI,  is negative    Objective:  /77   Pulse 95   Temp 97.3  F (36.3  C)   Resp 18   Ht 1.803 m (5' 11\")   Wt 95.7 kg (211 lb)   SpO2 95%   BMI 29.43 kg/m    Exam:  GENERAL APPEARANCE:  Alert, in no distress  ENT:  oral mucosa clear, shallow ulcer left side of tongue, moist mucous membranes, [place]  EYES:  EOM normal, conjunctiva and lids normal  NECK:  No adenopathy,masses or thyromegaly  RESP:  respiratory effort and palpation of chest normal, lungs clear to auscultation , no respiratory distress  CV:  Palpation and auscultation of heart done , regular rate and rhythm, no  murmur, +2 pedal pulses, peripheral edema  trace  in both LE  ABDOMEN:   soft, nontender, no hepatosplenomegaly or other masses  M/S:  gait slow with walker.  [place] with good strength. No joint inflammation  SKIN:  left groin wound moist with scant slough, no drainage, mild erythema at wound edges, mild surrounding induration. Fading bruising across " lower abdomen   PSYCH:  oriented X 3, memory impaired , affect and mood normal    Labs:   Labs done in SNF are in [facility] EPIC. Please refer to them using [employer]/Care Everywhere.    ASSESSMENT / PLAN:  (L02.214) Abscess of groin, left  (primary encounter diagnosis)  (L03.818) Cellulitis of other specified site  Comment: infection appears to be improving. Wound slowly healing, surrounding induration is softer.   Plan: continue hydrogel dressing every other day. Complete course of Augmentin today. Follow up with Dr Lizette bryant-patient declines follow up at this time.      (D64.9) Anemia, unspecified type  Comment: acute on chronic. Hgb improved at 11.4   Plan: follow Hgb     (K62.5) Rectal bleeding  Comment: no signs of active bleeding   Plan: monitor. He declined further work up.      (R06.02) Shortness of breath  (R60.0) Bilateral leg edema  Comment: volume status improved with less edema and shortness of breath.   Plan: continue lasix. Compression stockings. Follow BMP     (C91.90) CLL (chronic lymphocytic leukemia) (H)  (C81.10) Nodular sclerosing Hodgkin's lymphoma, unspecified body region (H)  (C71.9) Astrocytoma (H)  Comment: no acute issues. Blood counts are stable  Plan: continue decadron for brain tumor. Follow up with Dr Parry per usual schedule.      (R00.0) Sinus tachycardia  Comment: rate controlled.   Plan: continue metoprolol. Monitor VS, avoid hypotension.      (B37.0) Oral thrush  Comment: improved   Plan: continue nystatin solution for 7 days total. Good oral hygiene.      (R53.81) Physical deconditioning  Comment: slowly progressing in therapies  Plan: continue PHYSICAL THERAPY/OT. Goal is to return home with his wife and home care services.       Electronically signed by:  CRISELDA Cohen CNP

## 2019-08-15 NOTE — LETTER
8/15/2019        RE: Shaun Patel  [address]  [facility] [address]        [facility]  [facility] Record Number:  [id number]  [facility] of [facility] encounter took place[facility] [[id number]]  Chief Complaint   Patient presents with     RECHECK       HPI:    Shaun Patel  is a 83 year old (1935), who is being seen today for an episodic care visit.  HPI information obtained from: facility chart records, facility staff, patient report and [facility] chart review.   He came to this facility 8/6/2019 from [facility] for short term rehab and medical management following hospitalization after presenting to the ED with left groin pain. He was septic and found to have left  inguinal abscess and cellulitis. Urology consulted and he underwent I&D 7/31/2019 by Dr Bauer. Cultures grew Enterococcus faecalis, Enterococcus species and E coli. ID consulted. He was treated with IV vancomycin,  cefepime and flagyl, then changed to Unasyn. Discharged on Augmentin for 9 days. GI consulted for rectal bleeding and history of hemorrhoids. Patient declined work up and Hgb remained stable in the 9s.   His oncologist, Dr Parry, consulted and his note indicates that none of the patient's cancers are currently active.     Today's concerns are:      Abscess of groin, left-reports minimal pain. Afebrile. Feeling stronger. Wound care was changed to hydrogel earlier this week as the wound bed appeared dry.   Cellulitis of other specified site  Anemia, unspecified type  Rectal bleeding-no signs of bleeding.   Shortness of breath-reports this has improved. No cough or chest pain.   Bilateral leg edema  CLL (chronic lymphocytic leukemia) (H)  Nodular sclerosing Hodgkin's lymphoma, unspecified body region (H)  Astrocytoma (H)  Sinus tachycardia-HR: 95-99, outlier 113    BPs: 125/79, 119/76, 122/74  Oral thrush-reports mouth pain is better since starting nystatin. Good appetite.  "  Physical deconditioning-ambulating with walker and contact guard assist. Requires assist of 1 with transfers and cares.     Past Medical and Surgical History reviewed in Epic today.    MEDICATIONS:  Current Outpatient Medications   Medication Sig Dispense Refill     Cadexomer Iodine, topical, 0.9% (IODOSORB) 0.9 % GEL gel Apply topically every morning Apply to scrotum topically in the morning for cellulits of the scrotum       dexamethasone (DECADRON) 2 MG tablet Take 2 mg by mouth 3 times daily       FAMOTIDINE PO Take 10 mg by mouth 2 times daily        FUROSEMIDE PO Take 20 mg by mouth daily       metoprolol succinate ER (TOPROL-XL) 25 MG 24 hr tablet Take 12.5 mg by mouth daily Take 0.5 tablets by mouth once daily       nystatin (MYCOSTATIN) 580587 UNIT/GM external powder Apply topically 2 times daily For Cellulitis of scrotum. Apply topically to affected area(s) 2 times daily.       nystatin (MYCOSTATIN) 902037 UNIT/ML suspension Take 500,000 Units by mouth 4 times daily         REVIEW OF SYSTEMS:  4 point ROS including Respiratory, CV, GI and , other than that noted in the HPI,  is negative    Objective:  /77   Pulse 95   Temp 97.3  F (36.3  C)   Resp 18   Ht 1.803 m (5' 11\")   Wt 95.7 kg (211 lb)   SpO2 95%   BMI 29.43 kg/m     Exam:  GENERAL APPEARANCE:  Alert, in no distress  ENT:  oral mucosa clear, shallow ulcer left side of tongue, moist mucous membranes, [place]  EYES:  EOM normal, conjunctiva and lids normal  NECK:  No adenopathy,masses or thyromegaly  RESP:  respiratory effort and palpation of chest normal, lungs clear to auscultation , no respiratory distress  CV:  Palpation and auscultation of heart done , regular rate and rhythm, no  murmur, +2 pedal pulses, peripheral edema  trace  in both LE  ABDOMEN:   soft, nontender, no hepatosplenomegaly or other masses  M/S:  gait slow with walker.  MUHAMMAD with good strength. No joint inflammation  SKIN:  left groin wound moist with scant slough, " no drainage, mild erythema at wound edges, mild surrounding induration. Fading bruising across lower abdomen   PSYCH:  oriented X 3, memory impaired , affect and mood normal    Labs:   Labs done in SNF are in [place] EPIC. Please refer to them using EPIC/Care Everywhere.    ASSESSMENT / PLAN:  (L02.214) Abscess of groin, left  (primary encounter diagnosis)  (L03.818) Cellulitis of other specified site  Comment: infection appears to be improving. Wound slowly healing, surrounding induration is softer.   Plan:  continue hydrogel dressing every other day. Complete course of Augmentin today. Follow up with Dr Lizette bryant-patient declines follow up at this time.      (D64.9) Anemia, unspecified type  Comment: acute on chronic. Hgb improved  at 11.4   Plan: follow Hgb     (K62.5) Rectal bleeding  Comment: no signs of active bleeding   Plan: monitor. He declined further work up.      (R06.02) Shortness of breath  (R60.0) Bilateral leg edema  Comment:  volume status improved with less edema and shortness of breath.   Plan: continue lasix. Compression stockings. Follow BMP     (C91.90) CLL (chronic lymphocytic leukemia) (H)  (C81.10) Nodular sclerosing Hodgkin's lymphoma, unspecified body region (H)  (C71.9) Astrocytoma (H)  Comment: no acute issues. Blood counts are stable  Plan: continue decadron for brain tumor. Follow up with Dr Parry per usual schedule.      (R00.0) Sinus tachycardia  Comment: rate controlled.   Plan: continue metoprolol. Monitor VS, avoid hypotension.      (B37.0) Oral thrush  Comment:  improved   Plan:  continue nystatin solution for 7 days total. Good oral hygiene.      (R53.81) Physical deconditioning  Comment:  slowly progressing in therapies  Plan: continue PHYSICAL THERAPY/OT. Goal is to return home with his wife and home care services.       Electronically signed by:  CRISELDA Cohen CNP               Sincerely,        CRISELDA Cohen CNP

## 2019-08-21 ENCOUNTER — NURSING HOME VISIT (OUTPATIENT)
Dept: GERIATRICS | Facility: CLINIC | Age: 84
End: 2019-08-21
Payer: COMMERCIAL

## 2019-08-21 VITALS
HEART RATE: 108 BPM | TEMPERATURE: 97.3 F | OXYGEN SATURATION: 97 % | DIASTOLIC BLOOD PRESSURE: 89 MMHG | HEIGHT: 71 IN | RESPIRATION RATE: 18 BRPM | SYSTOLIC BLOOD PRESSURE: 134 MMHG | BODY MASS INDEX: 29.54 KG/M2 | WEIGHT: 211 LBS

## 2019-08-21 DIAGNOSIS — K62.5 RECTAL BLEEDING: ICD-10-CM

## 2019-08-21 DIAGNOSIS — C71.9 ASTROCYTOMA (H): ICD-10-CM

## 2019-08-21 DIAGNOSIS — C81.10 NODULAR SCLEROSING HODGKIN'S LYMPHOMA, UNSPECIFIED BODY REGION (H): ICD-10-CM

## 2019-08-21 DIAGNOSIS — R00.0 SINUS TACHYCARDIA: ICD-10-CM

## 2019-08-21 DIAGNOSIS — D64.9 ANEMIA, UNSPECIFIED TYPE: ICD-10-CM

## 2019-08-21 DIAGNOSIS — R60.0 BILATERAL LEG EDEMA: ICD-10-CM

## 2019-08-21 DIAGNOSIS — L03.818 CELLULITIS OF OTHER SPECIFIED SITE: ICD-10-CM

## 2019-08-21 DIAGNOSIS — R53.81 PHYSICAL DECONDITIONING: ICD-10-CM

## 2019-08-21 DIAGNOSIS — R06.02 SHORTNESS OF BREATH: ICD-10-CM

## 2019-08-21 DIAGNOSIS — C91.10 CLL (CHRONIC LYMPHOCYTIC LEUKEMIA) (H): ICD-10-CM

## 2019-08-21 DIAGNOSIS — B37.0 ORAL THRUSH: ICD-10-CM

## 2019-08-21 DIAGNOSIS — L02.214 ABSCESS OF GROIN, LEFT: Primary | ICD-10-CM

## 2019-08-21 PROCEDURE — 99309 SBSQ NF CARE MODERATE MDM 30: CPT | Performed by: NURSE PRACTITIONER

## 2019-08-21 RX ORDER — NYSTATIN 100000 [USP'U]/G
POWDER TOPICAL 2 TIMES DAILY
COMMUNITY

## 2019-08-21 ASSESSMENT — MIFFLIN-ST. JEOR: SCORE: 1674.22

## 2019-08-21 NOTE — LETTER
8/21/2019        RE: Shaun Patel  [address]  [facility] [address]        [facility]  [facility] Record Number:  [id number]  [facility] of [facility] where encounter took place[facility] [[id number]]  Chief Complaint   Patient presents with     RECHECK       HPI:    Shaun Patel  is a 83 year old (1935), who is being seen today for an episodic care visit.  HPI information obtained from: facility chart records, facility staff, patient report and [facility] chart review.  He came to this facility 8/6/2019 from [facility] for short term rehab and medical management following hospitalization after presenting to the ED with left groin pain. He was septic and found to have left  inguinal abscess and cellulitis. Urology consulted and he underwent I&D 7/31/2019 by Dr Bauer. Cultures grew Enterococcus faecalis, Enterococcus species and E coli. ID consulted. He was treated with IV vancomycin,  cefepime and flagyl, then changed to Unasyn. Discharged on Augmentin for 9 days. GI consulted for rectal bleeding and history of hemorrhoids. Patient declined work up and Hgb remained stable in the 9s.   His oncologist, Dr Parry, consulted and his note indicates that none of the patient's cancers are currently active.     Today's concern is:     Abscess of groin, left-afebrile. Reports feeling well with improved strength. Minimal pain. Completed Augmentin 8/15/2019.   Cellulitis of other specified site  Anemia, unspecified type  Rectal bleeding-denies any signs of bleeding.   Shortness of breath-reports this has improved.Mild shortness of breath with exertion, no cough or chest pain.   Bilateral leg edema  CLL (chronic lymphocytic leukemia) (H)  Nodular sclerosing Hodgkin's lymphoma, unspecified body region (H)  Astrocytoma (H)  Sinus tachycardia-HR:   BPs: 134/89, 120/77, 115/73   Oral thrush-completed nystatin solution. Reports pain is better. Good appetite.   Physical  "deconditioning-ambulating 280 ft with walker and stand by assist. Requires stand by assist with cares.  SLUMS 22/30, CPT 5.2/5.6     Past Medical and Surgical History reviewed in Epic today.    MEDICATIONS:  Current Outpatient Medications   Medication Sig Dispense Refill     dexamethasone (DECADRON) 2 MG tablet Take 2 mg by mouth 3 times daily       FAMOTIDINE PO Take 10 mg by mouth 2 times daily        FUROSEMIDE PO Take 20 mg by mouth daily       metoprolol succinate ER (TOPROL-XL) 25 MG 24 hr tablet Take 12.5 mg by mouth daily Take 0.5 tablets by mouth once daily       nystatin (MYCOSTATIN) 214714 UNIT/GM external cream Apply topically 3 times daily Apply to groin/reddened buttocks topically three times a day for yeast infection of the skin related to CANDIDAL STOMATITIS       Skin Protectants, Misc. (EUCERIN) cream Apply topically as needed for dry skin Apply to bilateral feet and LE topically one time a day for dry skin and Apply to bilateral feet and LE topically as needed for dry skin         REVIEW OF SYSTEMS:  4 point ROS including Respiratory, CV, GI and , other than that noted in the HPI,  is negative    Objective:  /89   Pulse 108   Temp 97.3  F (36.3  C)   Resp 18   Ht 1.803 m (5' 11\")   Wt 95.7 kg (211 lb)   SpO2 97%   BMI 29.43 kg/m     Exam:  GENERAL APPEARANCE:  Alert, in no distress  ENT:  oral mucosa clear, moist mucous membranes, [place]  EYES:  EOM normal, conjunctiva and lids normal  NECK:  No adenopathy,masses or thyromegaly  RESP:  respiratory effort and palpation of chest normal, lungs clear to auscultation , no respiratory distress  CV:  Palpation and auscultation of heart done , regular rate and rhythm, no  murmur, +2 pedal pulses, peripheral edema  trace  in both LE  ABDOMEN:   soft, nontender, no hepatosplenomegaly or other masses  M/S:  gait slow with walker.  MUHAMMAD with good strength. No joint inflammation  SKIN:  left groin wound moist with 100% slough, no drainage, mild " erythema at wound edges, mild surrounding induration. Fading bruising across lower abdomen   PSYCH:  oriented X 3, memory impaired , affect and mood normal    Labs:   Labs done in SNF are in [place] EPIC. Please refer to them using EPIC/Care Everywhere.    ASSESSMENT / PLAN:  (L02.214) Abscess of groin, left  (primary encounter diagnosis)  (L03.818) Cellulitis of other specified site  Comment: wound with increased slough. Infection appears resolved with induration improving.   Plan: schedule follow up with Dr Bauer, soonest available. Continue hydrogel wound care every other day and prn. Closely monitor for signs of infection.       (D64.9) Anemia, unspecified type  Comment: acute on chronic. Hgb improved at 11.4   Plan: follow Hgb     (K62.5) Rectal bleeding  Comment: no signs of active bleeding   Plan: monitor. He declined further GI work up.      (R06.02) Shortness of breath  (R60.0) Bilateral leg edema  Comment: volume status improved with less edema and shortness of breath.   Plan: continue lasix. Compression stockings. Follow BMP     (C91.90) CLL (chronic lymphocytic leukemia) (H)  (C81.10) Nodular sclerosing Hodgkin's lymphoma, unspecified body region (H)  (C71.9) Astrocytoma (H)  Comment: no acute issues. Blood counts are stable  Plan: continue decadron for brain tumor. Follow up with Dr Parry per usual schedule.      (R00.0) Sinus tachycardia  Comment: rate controlled.   Plan: continue metoprolol. Monitor VS. Avoid hypotension due to advanced age and fall risk.      (B37.0) Oral thrush  Comment: appears resolved. High risk for recurrence given decadron use.   Plan: good oral hygiene.      (R53.81) Physical deconditioning  Comment: slowly progressing in therapies  Plan: continue PHYSICAL THERAPY/OT. Goal is to return home with his wife and home care services. Family is considering AL.     Electronically signed by:  CRISELDA Cohen CNP               Sincerely,        CRISELDA Cohen  CNP

## 2019-08-21 NOTE — PROGRESS NOTES
[facility]  [facility] Record Number:  [id number]  Place of Service where encounter took place:  [facility] [[id number]]  Chief Complaint   Patient presents with     RECHECK       HPI:    Shaun Patel  is a 83 year old (1935), who is being seen today for an episodic care visit.  HPI information obtained from: facility chart records, facility staff, patient report and [facility] chart review.  He came to this facility 8/6/2019 from [facility] for short term rehab and medical management following hospitalization after presenting to the ED with left groin pain. He was septic and found to have left  inguinal abscess and cellulitis. Urology consulted and he underwent I&D 7/31/2019 by Dr Bauer. Cultures grew Enterococcus faecalis, Enterococcus species and E coli. ID consulted. He was treated with IV vancomycin,  cefepime and flagyl, then changed to Unasyn. Discharged on Augmentin for 9 days. GI consulted for rectal bleeding and history of hemorrhoids. Patient declined work up and Hgb remained stable in the 9s.   His oncologist, Dr Parry, consulted and his note indicates that none of the patient's cancers are currently active.     Today's concern is:     Abscess of groin, left-afebrile. Reports feeling well with improved strength. Minimal pain. Completed Augmentin 8/15/2019.   Cellulitis of other specified site  Anemia, unspecified type  Rectal bleeding-denies any signs of bleeding.   Shortness of breath-reports this has improved.Mild shortness of breath with exertion, no cough or chest pain.   Bilateral leg edema  CLL (chronic lymphocytic leukemia) (H)  Nodular sclerosing Hodgkin's lymphoma, unspecified body region (H)  Astrocytoma (H)  Sinus tachycardia-HR:   BPs: 134/89, 120/77, 115/73   Oral thrush-completed nystatin solution. Reports pain is better. Good appetite.   Physical deconditioning-ambulating 280 ft with walker and stand by assist. Requires stand by  "assist with cares.  SLUMS 22/30, CPT 5.2/5.6     Past Medical and Surgical History reviewed in Epic today.    MEDICATIONS:  Current Outpatient Medications   Medication Sig Dispense Refill     dexamethasone (DECADRON) 2 MG tablet Take 2 mg by mouth 3 times daily       FAMOTIDINE PO Take 10 mg by mouth 2 times daily        FUROSEMIDE PO Take 20 mg by mouth daily       metoprolol succinate ER (TOPROL-XL) 25 MG 24 hr tablet Take 12.5 mg by mouth daily Take 0.5 tablets by mouth once daily       nystatin (MYCOSTATIN) 703348 UNIT/GM external cream Apply topically 3 times daily Apply to groin/reddened buttocks topically three times a day for yeast infection of the skin related to CANDIDAL STOMATITIS       Skin Protectants, Misc. (EUCERIN) cream Apply topically as needed for dry skin Apply to bilateral feet and LE topically one time a day for dry skin and Apply to bilateral feet and LE topically as needed for dry skin         REVIEW OF SYSTEMS:  4 point ROS including Respiratory, CV, GI and , other than that noted in the HPI,  is negative    Objective:  /89   Pulse 108   Temp 97.3  F (36.3  C)   Resp 18   Ht 1.803 m (5' 11\")   Wt 95.7 kg (211 lb)   SpO2 97%   BMI 29.43 kg/m    Exam:  GENERAL APPEARANCE:  Alert, in no distress  ENT:  oral mucosa clear, moist mucous membranes, [place]  EYES:  EOM normal, conjunctiva and lids normal  NECK:  No adenopathy,masses or thyromegaly  RESP:  respiratory effort and palpation of chest normal, lungs clear to auscultation , no respiratory distress  CV:  Palpation and auscultation of heart done , regular rate and rhythm, no  murmur, +2 pedal pulses, peripheral edema  trace  in both LE  ABDOMEN:   soft, nontender, no hepatosplenomegaly or other masses  M/S:  gait slow with walker.  MUHAMMAD with good strength. No joint inflammation  SKIN:  left groin wound moist with 100% slough, no drainage, mild erythema at wound edges, mild surrounding induration. Fading bruising across lower " abdomen   PSYCH:  oriented X 3, memory impaired , affect and mood normal    Labs:   Labs done in SNF are in [place] EPIC. Please refer to them using EPIC/Care Everywhere.    ASSESSMENT / PLAN:  (L02.214) Abscess of groin, left  (primary encounter diagnosis)  (L03.818) Cellulitis of other specified site  Comment: wound with increased slough. Infection appears resolved with induration improving.   Plan: schedule follow up with Dr Bauer, soonest available. Continue hydrogel wound care every other day and prn. Closely monitor for signs of infection.       (D64.9) Anemia, unspecified type  Comment: acute on chronic. Hgb improved at 11.4   Plan: follow Hgb     (K62.5) Rectal bleeding  Comment: no signs of active bleeding   Plan: monitor. He declined further GI work up.      (R06.02) Shortness of breath  (R60.0) Bilateral leg edema  Comment: volume status improved with less edema and shortness of breath.   Plan: continue lasix. Compression stockings. Follow BMP     (C91.90) CLL (chronic lymphocytic leukemia) (H)  (C81.10) Nodular sclerosing Hodgkin's lymphoma, unspecified body region (H)  (C71.9) Astrocytoma (H)  Comment: no acute issues. Blood counts are stable  Plan: continue decadron for brain tumor. Follow up with Dr Parry per usual schedule.      (R00.0) Sinus tachycardia  Comment: rate controlled.   Plan: continue metoprolol. Monitor VS. Avoid hypotension due to advanced age and fall risk.      (B37.0) Oral thrush  Comment: appears resolved. High risk for recurrence given decadron use.   Plan: good oral hygiene.      (R53.81) Physical deconditioning  Comment: slowly progressing in therapies  Plan: continue PHYSICAL THERAPY/OT. Goal is to return home with his wife and home care services. Family is considering AL.     Electronically signed by:  CRISELDA Cohen CNP

## 2019-08-22 ENCOUNTER — HOSPITAL LABORATORY (OUTPATIENT)
Dept: OTHER | Facility: CLINIC | Age: 84
End: 2019-08-22

## 2019-08-22 LAB
ANION GAP SERPL CALCULATED.3IONS-SCNC: 8 MMOL/L (ref 3–14)
BUN SERPL-MCNC: 30 MG/DL (ref 7–30)
CALCIUM SERPL-MCNC: 8 MG/DL (ref 8.5–10.1)
CHLORIDE SERPL-SCNC: 103 MMOL/L (ref 94–109)
CO2 SERPL-SCNC: 29 MMOL/L (ref 20–32)
CREAT SERPL-MCNC: 0.98 MG/DL (ref 0.66–1.25)
ERYTHROCYTE [DISTWIDTH] IN BLOOD BY AUTOMATED COUNT: 16.8 % (ref 10–15)
GFR SERPL CREATININE-BSD FRML MDRD: 71 ML/MIN/{1.73_M2}
GLUCOSE SERPL-MCNC: 289 MG/DL (ref 70–99)
HCT VFR BLD AUTO: 31.1 % (ref 40–53)
HGB BLD-MCNC: 10.4 G/DL (ref 13.3–17.7)
MCH RBC QN AUTO: 33.2 PG (ref 26.5–33)
MCHC RBC AUTO-ENTMCNC: 33.4 G/DL (ref 31.5–36.5)
MCV RBC AUTO: 99 FL (ref 78–100)
PLATELET # BLD AUTO: 76 10E9/L (ref 150–450)
POTASSIUM SERPL-SCNC: 3.6 MMOL/L (ref 3.4–5.3)
RBC # BLD AUTO: 3.13 10E12/L (ref 4.4–5.9)
SODIUM SERPL-SCNC: 140 MMOL/L (ref 133–144)
WBC # BLD AUTO: 6.4 10E9/L (ref 4–11)

## 2019-08-23 ENCOUNTER — NURSING HOME VISIT (OUTPATIENT)
Dept: GERIATRICS | Facility: CLINIC | Age: 84
End: 2019-08-23
Payer: COMMERCIAL

## 2019-08-23 VITALS
DIASTOLIC BLOOD PRESSURE: 78 MMHG | RESPIRATION RATE: 18 BRPM | HEIGHT: 71 IN | HEART RATE: 103 BPM | SYSTOLIC BLOOD PRESSURE: 131 MMHG | OXYGEN SATURATION: 97 % | WEIGHT: 211 LBS | BODY MASS INDEX: 29.54 KG/M2 | TEMPERATURE: 97.3 F

## 2019-08-23 DIAGNOSIS — R00.0 SINUS TACHYCARDIA: ICD-10-CM

## 2019-08-23 DIAGNOSIS — R06.02 SHORTNESS OF BREATH: ICD-10-CM

## 2019-08-23 DIAGNOSIS — D64.9 ANEMIA, UNSPECIFIED TYPE: ICD-10-CM

## 2019-08-23 DIAGNOSIS — B37.0 ORAL THRUSH: ICD-10-CM

## 2019-08-23 DIAGNOSIS — L02.214 ABSCESS OF GROIN, LEFT: Primary | ICD-10-CM

## 2019-08-23 DIAGNOSIS — C81.10 NODULAR SCLEROSING HODGKIN'S LYMPHOMA, UNSPECIFIED BODY REGION (H): ICD-10-CM

## 2019-08-23 DIAGNOSIS — R60.0 BILATERAL LEG EDEMA: ICD-10-CM

## 2019-08-23 DIAGNOSIS — L03.818 CELLULITIS OF OTHER SPECIFIED SITE: ICD-10-CM

## 2019-08-23 DIAGNOSIS — C71.9 ASTROCYTOMA (H): ICD-10-CM

## 2019-08-23 DIAGNOSIS — R53.81 PHYSICAL DECONDITIONING: ICD-10-CM

## 2019-08-23 DIAGNOSIS — K62.5 RECTAL BLEEDING: ICD-10-CM

## 2019-08-23 DIAGNOSIS — C91.10 CLL (CHRONIC LYMPHOCYTIC LEUKEMIA) (H): ICD-10-CM

## 2019-08-23 PROCEDURE — 99309 SBSQ NF CARE MODERATE MDM 30: CPT | Performed by: NURSE PRACTITIONER

## 2019-08-23 ASSESSMENT — MIFFLIN-ST. JEOR: SCORE: 1674.22

## 2019-08-23 NOTE — PROGRESS NOTES
[facility]  [place] [facility] Record Number:  [id number]  Place of Service where encounter took place:  [facility] [[id number]]  Chief Complaint   Patient presents with     RECHECK       HPI:    Shaun Patel  is a 83 year old (1935), who is being seen today for an episodic care visit.  HPI information obtained from: facility chart records, facility staff, patient report and [facility] chart review.   He came to this facility 8/6/2019 from [facility] for short term rehab and medical management following hospitalization after presenting to the ED with left groin pain. He was septic and found to have left  inguinal abscess and cellulitis. Urology consulted and he underwent I&D 7/31/2019 by Dr Bauer. Cultures grew Enterococcus faecalis, Enterococcus species and E coli. ID consulted. He was treated with IV vancomycin,  cefepime and flagyl, then changed to Unasyn. Discharged on Augmentin for 9 days. GI consulted for rectal bleeding and history of hemorrhoids. Patient declined work up and Hgb remained stable in the 9s.   His oncologist, Dr Parry, consulted and his note indicates that none of the patient's cancers are currently active.    Today's concerns are:      Abscess of groin, left-afebrile. Reports mild pain at times. Feeling stronger. Completed Augmentin 8/15/2019  Cellulitis of other specified site  Anemia, unspecified type  Rectal bleeding-denies recent episodes   Shortness of breath-reports his breathing has improved and is back to baseline. Occasional cough. No shortness of breath.   Bilateral leg edema  CLL (chronic lymphocytic leukemia) (H)  Nodular sclerosing Hodgkin's lymphoma, unspecified body region (H)  Astrocytoma (H)  Sinus tachycardia-HR:    BPS: 131/78, 134/89, 120/77   Oral thrush-reports his mouth is less tender. Good appetite. Completed nystatin 8/19/2019   Physical deconditioning-ambulating 280 ft with walker and stand by assist. Requires assist of  "1 with transfers and cares.   SLUMS 22/30, CPT 5.2/5.6     Past Medical and Surgical History reviewed in Epic today.    MEDICATIONS:  Current Outpatient Medications   Medication Sig Dispense Refill     dexamethasone (DECADRON) 2 MG tablet Take 2 mg by mouth 3 times daily       FAMOTIDINE PO Take 10 mg by mouth 2 times daily        FUROSEMIDE PO Take 20 mg by mouth daily       metoprolol succinate ER (TOPROL-XL) 25 MG 24 hr tablet Take 12.5 mg by mouth daily Take 0.5 tablets by mouth once daily       nystatin (MYCOSTATIN) 686907 UNIT/GM external cream Apply topically 3 times daily        Skin Protectants, Misc. (EUCERIN) cream Apply topically as needed for dry skin Apply to bilateral feet and LE topically one time a day for dry skin and Apply to bilateral feet and LE topically as needed for dry skin         REVIEW OF SYSTEMS:  4 point ROS including Respiratory, CV, GI and , other than that noted in the HPI,  is negative    Objective:  /78   Pulse 103   Temp 97.3  F (36.3  C)   Resp 18   Ht 1.803 m (5' 11\")   Wt 95.7 kg (211 lb)   SpO2 97%   BMI 29.43 kg/m    Exam:  GENERAL APPEARANCE:  Alert, in no distress  ENT:   Mouth and posterior oropharynx normal, small sore left side of tongue, moist mucous membranes, [place]  EYES:  EOM normal, conjunctiva and lids normal, PERRL  NECK:  No adenopathy,masses or thyromegaly  RESP:  respiratory effort and palpation of chest normal, lungs clear to auscultation , no respiratory distress  CV:  Palpation and auscultation of heart done , regular rate and rhythm, no  murmur, +2 pedal pulses, peripheral edema trace  both LE  ABDOMEN:   soft, nontender, no hepatosplenomegaly or other masses  M/S:  gait slow with walker.  MUHAM[place]AD with good strength. No joint inflammation  SKIN:  left groin wound approx 4 x 4 cm, moist with thick slough, mild erythema at wound edges, no drainage, mild induration. Fading bruising across lower abdomen   PSYCH:  oriented X 3, memory impaired , affect " and mood normal     Labs:   Lab Results   Component Value Date    WBC 6.4 08/22/2019     Lab Results   Component Value Date    RBC 3.13 08/22/2019     Lab Results   Component Value Date    HGB 10.4 08/22/2019     Lab Results   Component Value Date    HCT 31.1 08/22/2019     Lab Results   Component Value Date    MCV 99 08/22/2019     Lab Results   Component Value Date    MCH 33.2 08/22/2019     Lab Results   Component Value Date    MCHC 33.4 08/22/2019     Lab Results   Component Value Date    RDW 16.8 08/22/2019     Lab Results   Component Value Date    PLT 76 08/22/2019     Last Comprehensive Metabolic Panel:  Sodium   Date Value Ref Range Status   08/22/2019 140 133 - 144 mmol/L Final     Potassium   Date Value Ref Range Status   08/22/2019 3.6 3.4 - 5.3 mmol/L Final     Chloride   Date Value Ref Range Status   08/22/2019 103 94 - 109 mmol/L Final     Carbon Dioxide   Date Value Ref Range Status   08/22/2019 29 20 - 32 mmol/L Final     Anion Gap   Date Value Ref Range Status   08/22/2019 8 3 - 14 mmol/L Final     Glucose   Date Value Ref Range Status   08/22/2019 289 (H) 70 - 99 mg/dL Final     Urea Nitrogen   Date Value Ref Range Status   08/22/2019 30 7 - 30 mg/dL Final     Creatinine   Date Value Ref Range Status   08/22/2019 0.98 0.66 - 1.25 mg/dL Final     GFR Estimate   Date Value Ref Range Status   08/22/2019 71 >60 mL/min/[1.73_m2] Final     Comment:     Non  GFR Calc  Starting 12/18/2018, serum creatinine based estimated GFR (eGFR) will be   calculated using the Chronic Kidney Disease Epidemiology Collaboration   (CKD-EPI) equation.       Calcium   Date Value Ref Range Status   08/22/2019 8.0 (L) 8.5 - 10.1 mg/dL Final     ASSESSMENT / PLAN:  (L02.214) Abscess of groin, left  (primary encounter diagnosis)  (L03.818) Cellulitis of other specified site  Comment: infection appears resolved. Area of induration is smaller and softer. Wound has developed a layer of slough that will prohibit  further healing.   Plan: follow up has been scheduled with Dr Bauer on 8/27/2019 to eval the need for debridement. Continue wound care with hydrogel and foam dressing, change daily.     (D64.9) Anemia, unspecified type  Comment: acute on chronic, Hgb stable at 10.4   Plan: follow Hgb     (K62.5) Rectal bleeding  Comment: no recent episodes. He declined GI work up.   Plan: monitor symptoms     (R06.02) Shortness of breath   (R60.0) Bilateral leg edema  Comment: volume status improved. Renal function at baseline   Plan: continue lasix. Compression stockings during the day.     (C91.90) CLL (chronic lymphocytic leukemia) (H)  (C81.10) Nodular sclerosing Hodgkin's lymphoma, unspecified body region (H)  (C71.9) Astrocytoma (H)  Comment: blood counts stable and no acute issues   Plan: continue decadron for the brain tumor. Follow up with Oncology per usual routine.     (R00.0) Sinus tachycardia  Comment: rate controlled   Plan: continue metoprolol. Monitor VS. Avoid hypotension due to advanced age and fall risk     (B37.0) Oral thrush  Comment: appears resolved   Plan: monitor for recurrence     (R53.81) Physical deconditioning  Comment: progressing in therapies   Plan: continue PHYSICAL THERAPY/OT. Goal is to return home with services. Family is considering AL in the near future for patient and his wife.         Electronically signed by:  CRISELDA Cohen CNP

## 2019-08-23 NOTE — LETTER
8/23/2019        RE: Shaun Patel  [address]  [facility] [address]        [facility]  [place] [facility] Record Number:  [id number]  [facility] where encounter took place[facility] [[id number]]  Chief Complaint   Patient presents with     RECHECK       HPI:    Shaun Patel  is a 83 year old (1935), who is being seen today for an episodic care visit.  HPI information obtained from: facility chart records, facility staff, patient report and [facility] chart review.   He came to this facility 8/6/2019 from [facility] for short term rehab and medical management following hospitalization after presenting to the ED with left groin pain. He was septic and found to have left  inguinal abscess and cellulitis. Urology consulted and he underwent I&D 7/31/2019 by Dr Bauer. Cultures grew Enterococcus faecalis, Enterococcus species and E coli. ID consulted. He was treated with IV vancomycin,  cefepime and flagyl, then changed to Unasyn. Discharged on Augmentin for 9 days. GI consulted for rectal bleeding and history of hemorrhoids. Patient declined work up and Hgb remained stable in the 9s.   His oncologist, Dr Parry, consulted and his note indicates that none of the patient's cancers are currently active.    Today's concerns are:      Abscess of groin, left-afebrile. Reports mild pain at times. Feeling stronger. Completed Augmentin 8/15/2019  Cellulitis of other specified site  Anemia, unspecified type  Rectal bleeding-denies recent episodes   Shortness of breath-reports his breathing has improved and is back to baseline. Occasional cough. No shortness of breath.   Bilateral leg edema  CLL (chronic lymphocytic leukemia) (H)  Nodular sclerosing Hodgkin's lymphoma, unspecified body region (H)  Astrocytoma (H)  Sinus tachycardia-HR:    BPS: 131/78, 134/89, 120/77   Oral thrush-reports his mouth is less tender. Good appetite. Completed nystatin 8/19/2019  "  Physical deconditioning-ambulating 280 ft with walker and stand by assist. Requires assist of 1 with transfers and cares.   SLUMS 22/30, CPT 5.2/5.6     Past Medical and Surgical History reviewed in Epic today.    MEDICATIONS:  Current Outpatient Medications   Medication Sig Dispense Refill     dexamethasone (DECADRON) 2 MG tablet Take 2 mg by mouth 3 times daily       FAMOTIDINE PO Take 10 mg by mouth 2 times daily        FUROSEMIDE PO Take 20 mg by mouth daily       metoprolol succinate ER (TOPROL-XL) 25 MG 24 hr tablet Take 12.5 mg by mouth daily Take 0.5 tablets by mouth once daily       nystatin (MYCOSTATIN) [id number] UNIT/GM external cream Apply topically 3 times daily        Skin Protectants, Misc. (EUCERIN) cream Apply topically as needed for dry skin Apply to bilateral feet and LE topically one time a day for dry skin and Apply to bilateral feet and LE topically as needed for dry skin         REVIEW OF SYSTEMS:  4 point ROS including Respiratory, CV, GI and , other than that noted in the HPI,  is negative    Objective:  /78   Pulse 103   Temp 97.3  F (36.3  C)   Resp 18   Ht 1.803 m (5' 11\")   Wt 95.7 kg (211 lb)   SpO2 97%   BMI 29.43 kg/m     Exam:  GENERAL APPEARANCE:  Alert, in no distress  ENT:   Mouth and posterior oropharynx normal, small sore left side of tongue, moist mucous membranes, [place]  EYES:  EOM normal, conjunctiva and lids normal, PERRL  NECK:  No adenopathy,masses or thyromegaly  RESP:  respiratory effort and palpation of chest normal, lungs clear to auscultation , no respiratory distress  CV:  Palpation and auscultation of heart done , regular rate and rhythm, no  murmur, +2 pedal pulses, peripheral edema  trace  both LE  ABDOMEN:   soft, nontender, no hepatosplenomegaly or other masses  M/S:  gait slow with walker.  MUHAMMAD with good strength. No joint inflammation  SKIN:  left groin wound approx 4 x 4 cm, moist with thick slough, mild erythema at wound edges, no drainage, mild " induration. Fading bruising across lower abdomen   PSYCH:  oriented X 3, memory impaired , affect and mood normal     Labs:   Lab Results   Component Value Date    WBC 6.4 08/22/2019     Lab Results   Component Value Date    RBC 3.13 08/22/2019     Lab Results   Component Value Date    HGB 10.4 08/22/2019     Lab Results   Component Value Date    HCT 31.1 08/22/2019     Lab Results   Component Value Date    MCV 99 08/22/2019     Lab Results   Component Value Date    MCH 33.2 08/22/2019     Lab Results   Component Value Date    MCHC 33.4 08/22/2019     Lab Results   Component Value Date    RDW 16.8 08/22/2019     Lab Results   Component Value Date    PLT 76 08/22/2019     Last Comprehensive Metabolic Panel:  Sodium   Date Value Ref Range Status   08/22/2019 140 133 - 144 mmol/L Final     Potassium   Date Value Ref Range Status   08/22/2019 3.6 3.4 - 5.3 mmol/L Final     Chloride   Date Value Ref Range Status   08/22/2019 103 94 - 109 mmol/L Final     Carbon Dioxide   Date Value Ref Range Status   08/22/2019 29 20 - 32 mmol/L Final     Anion Gap   Date Value Ref Range Status   08/22/2019 8 3 - 14 mmol/L Final     Glucose   Date Value Ref Range Status   08/22/2019 289 (H) 70 - 99 mg/dL Final     Urea Nitrogen   Date Value Ref Range Status   08/22/2019 30 7 - 30 mg/dL Final     Creatinine   Date Value Ref Range Status   08/22/2019 0.98 0.66 - 1.25 mg/dL Final     GFR Estimate   Date Value Ref Range Status   08/22/2019 71 >60 mL/min/[1.73_m2] Final     Comment:     Non  GFR Calc  Starting 12/18/2018, serum creatinine based estimated GFR (eGFR) will be   calculated using the Chronic Kidney Disease Epidemiology Collaboration   (CKD-EPI) equation.       Calcium   Date Value Ref Range Status   08/22/2019 8.0 (L) 8.5 - 10.1 mg/dL Final     ASSESSMENT / PLAN:  (L02.214) Abscess of groin, left  (primary encounter diagnosis)  (L03.818) Cellulitis of other specified site  Comment: infection appears resolved. Area  of induration is smaller and softer. Wound has developed a layer of slough that will prohibit further healing.   Plan: follow up has been scheduled with Dr Bauer on 8/27/2019 to eval the need for debridement. Continue wound care with hydrogel and foam dressing, change daily.     (D64.9) Anemia, unspecified type  Comment: acute on chronic, Hgb stable at 10.4   Plan: follow Hgb     (K62.5) Rectal bleeding  Comment: no recent episodes. He declined GI work up.   Plan: monitor symptoms     (R06.02) Shortness of breath   (R60.0) Bilateral leg edema  Comment: volume status improved. Renal function at baseline   Plan: continue lasix. Compression stockings during the day.     (C91.90) CLL (chronic lymphocytic leukemia) (H)  (C81.10) Nodular sclerosing Hodgkin's lymphoma, unspecified body region (H)  (C71.9) Astrocytoma (H)  Comment: blood counts stable and no acute issues   Plan: continue decadron for the brain tumor. Follow up with Oncology per usual routine.     (R00.0) Sinus tachycardia  Comment: rate controlled   Plan: continue metoprolol. Monitor VS. Avoid hypotension due to advanced age and fall risk     (B37.0) Oral thrush  Comment: appears resolved   Plan: monitor for recurrence     (R53.81) Physical deconditioning  Comment: progressing in therapies   Plan: continue PHYSICAL THERAPY/OT. Goal is to return home with services. Family is considering AL in the near future for patient and his wife.         Electronically signed by:  CRISELDA Cohen CNP               Sincerely,        CRISELDA Cohen CNP

## 2019-08-26 ENCOUNTER — NURSING HOME VISIT (OUTPATIENT)
Dept: GERIATRICS | Facility: CLINIC | Age: 84
End: 2019-08-26
Payer: COMMERCIAL

## 2019-08-26 VITALS
OXYGEN SATURATION: 96 % | DIASTOLIC BLOOD PRESSURE: 72 MMHG | HEIGHT: 71 IN | RESPIRATION RATE: 18 BRPM | WEIGHT: 211 LBS | BODY MASS INDEX: 29.54 KG/M2 | TEMPERATURE: 98.1 F | SYSTOLIC BLOOD PRESSURE: 110 MMHG | HEART RATE: 100 BPM

## 2019-08-26 DIAGNOSIS — C71.9 ASTROCYTOMA (H): ICD-10-CM

## 2019-08-26 DIAGNOSIS — L02.214 ABSCESS OF GROIN, LEFT: Primary | ICD-10-CM

## 2019-08-26 DIAGNOSIS — R00.0 SINUS TACHYCARDIA: ICD-10-CM

## 2019-08-26 DIAGNOSIS — D64.9 ANEMIA, UNSPECIFIED TYPE: ICD-10-CM

## 2019-08-26 DIAGNOSIS — R60.0 BILATERAL LEG EDEMA: ICD-10-CM

## 2019-08-26 DIAGNOSIS — C91.10 CLL (CHRONIC LYMPHOCYTIC LEUKEMIA) (H): ICD-10-CM

## 2019-08-26 DIAGNOSIS — B37.0 ORAL THRUSH: ICD-10-CM

## 2019-08-26 DIAGNOSIS — C81.10 NODULAR SCLEROSING HODGKIN'S LYMPHOMA, UNSPECIFIED BODY REGION (H): ICD-10-CM

## 2019-08-26 DIAGNOSIS — L03.818 CELLULITIS OF OTHER SPECIFIED SITE: ICD-10-CM

## 2019-08-26 DIAGNOSIS — R53.81 PHYSICAL DECONDITIONING: ICD-10-CM

## 2019-08-26 PROCEDURE — 99309 SBSQ NF CARE MODERATE MDM 30: CPT | Performed by: NURSE PRACTITIONER

## 2019-08-26 ASSESSMENT — MIFFLIN-ST. JEOR: SCORE: 1674.22

## 2019-08-26 NOTE — LETTER
8/26/2019        RE: Shaun Patel  [address]  [facility] [address]        [facility]  [place] [facility] Record Number:  [id number]  [facility] [[id number]]  Chief Complaint   Patient presents with     RECHECK       HPI:    Shaun Patel  is a 83 year old (1935), who is being seen today for an episodic care visit.  HPI information obtained from: facility chart records, facility staff, patient report and [facility] chart review.  He came to this facility 8/6/2019 from [facility] for short term rehab and medical management following hospitalization after presenting to the ED with left groin pain. He was septic and found to have left  inguinal abscess and cellulitis. Urology consulted and he underwent I&D 7/31/2019 by Dr Bauer. Cultures grew Enterococcus faecalis, Enterococcus species and E coli. ID consulted. He was treated with IV vancomycin,  cefepime and flagyl, then changed to Unasyn. Discharged on Augmentin for 9 days. GI consulted for rectal bleeding and history of hemorrhoids. Patient declined work up and Hgb remained stable in the 9s.   His oncologist, Dr Parry, consulted and his note indicates that none of the patient's cancers are currently active.     Today's concern is:     Abscess of groin, left-he is seen today at the request of the nurse who is concerned that the wound has increased slough and undermining at the edges. Patient denies pain. Completed Augmentin 8/15/2019. Afebrile.   Cellulitis of other specified site  Anemia, unspecified type  Bilateral leg edema-denies cough, shortness of breath or chest pain   CLL (chronic lymphocytic leukemia) (H)  Nodular sclerosing Hodgkin's lymphoma, unspecified body region (H)  Astrocytoma (H)  Sinus tachycardia-HR:    Oral thrush-mouth is less painful.  Good appetite.   Physical deconditioning-ambulating 280 ft with walker and stand by assist. Requires  "assist of 1 with transfers and cares.   SLUMS 22/30, CPT 5.2/5.6     Past Medical and Surgical History reviewed in Epic today.    MEDICATIONS:  Current Outpatient Medications   Medication Sig Dispense Refill     dexamethasone (DECADRON) 2 MG tablet Take 2 mg by mouth 3 times daily       FAMOTIDINE PO Take 10 mg by mouth 2 times daily        FUROSEMIDE PO Take 20 mg by mouth daily       metoprolol succinate ER (TOPROL-XL) 25 MG 24 hr tablet Take 12.5 mg by mouth daily Take 0.5 tablets by mouth once daily       nystatin (MYCOSTATIN) [id number] UNIT/GM external cream Apply topically 3 times daily        Skin Protectants, Misc. (EUCERIN) cream Apply topically as needed for dry skin Apply to bilateral feet and LE topically one time a day for dry skin and Apply to bilateral feet and LE topically as needed for dry skin         REVIEW OF SYSTEMS:  4 point ROS including Respiratory, CV, GI and , other than that noted in the HPI,  is negative    Objective:  /72   Pulse 100   Temp 98.1  F (36.7  C)   Resp 18   Ht 1.803 m (5' 11\")   Wt 95.7 kg (211 lb)   SpO2 96%   BMI 29.43 kg/m     Exam:  GENERAL APPEARANCE:  Alert, in no distress  ENT:   Mouth and posterior oropharynx normal, small sore left side of tongue, moist mucous membranes, [place]  EYES:  EOM normal, conjunctiva and lids normal, PERRL  NECK:  No adenopathy,masses or thyromegaly  RESP:  respiratory effort and palpation of chest normal, lungs clear to auscultation , no respiratory distress  CV:  Palpation and auscultation of heart done , regular rate and rhythm, no  murmur, +2 pedal pulses, peripheral edema trace  both LE  ABDOMEN:   soft, nontender, no hepatosplenomegaly or other masses  M/S:  gait slow with walker.  [place]UHAM[place]AD with good strength. No joint inflammation  SKIN:  left groin wound approx 4 x 4 cm, moist with thick dark, slough, mild erythema at wound edges, no drainage, surrounding  Induration.  Fading bruising across lower abdomen   PSYCH:  oriented X 3, " memory impaired , affect and mood normal    Labs:   Lab Results   Component Value Date    WBC 6.4 08/22/2019     Lab Results   Component Value Date    RBC 3.13 08/22/2019     Lab Results   Component Value Date    HGB 10.4 08/22/2019     Lab Results   Component Value Date    HCT 31.1 08/22/2019     Lab Results   Component Value Date    MCV 99 08/22/2019     Lab Results   Component Value Date    MCH 33.2 08/22/2019     Lab Results   Component Value Date    MCHC 33.4 08/22/2019     Lab Results   Component Value Date    RDW 16.8 08/22/2019     Lab Results   Component Value Date    PLT 76 08/22/2019     Last Comprehensive Metabolic Panel:  Sodium   Date Value Ref Range Status   08/22/2019 140 133 - 144 mmol/L Final     Potassium   Date Value Ref Range Status   08/22/2019 3.6 3.4 - 5.3 mmol/L Final     Chloride   Date Value Ref Range Status   08/22/2019 103 94 - 109 mmol/L Final     Carbon Dioxide   Date Value Ref Range Status   08/22/2019 29 20 - 32 mmol/L Final     Anion Gap   Date Value Ref Range Status   08/22/2019 8 3 - 14 mmol/L Final     Glucose   Date Value Ref Range Status   08/22/2019 289 (H) 70 - 99 mg/dL Final     Urea Nitrogen   Date Value Ref Range Status   08/22/2019 30 7 - 30 mg/dL Final     Creatinine   Date Value Ref Range Status   08/22/2019 0.98 0.66 - 1.25 mg/dL Final     GFR Estimate   Date Value Ref Range Status   08/22/2019 71 >60 mL/min/[1.73_m2] Final     Comment:     Non  GFR Calc  Starting 12/18/2018, serum creatinine based estimated GFR (eGFR) will be   calculated using the Chronic Kidney Disease Epidemiology Collaboration   (CKD-EPI) equation.       Calcium   Date Value Ref Range Status   08/22/2019 8.0 (L) 8.5 - 10.1 mg/dL Final       ASSESSMENT / PLAN:  (L02.214) Abscess of groin, left  (primary encounter diagnosis)  (L03.818) Cellulitis of other specified site  Comment: wound with delayed healing.  Infection appears resolved and the area of induration is softer. May benefit  from wound vac or light packing with nugauze, but will defer to Dr Bauer.   Plan: continue wound care with hydrogel and foam dressing. Follow up with Dr Bauer tomorrow.     (D64.9) Anemia, unspecified type  Comment: acute on chronic, Hgb stable   Plan: follow Hgb     (R60.0) Bilateral leg edema  Comment: improved. Renal function at baseline   Plan: continue lasix. Compression stockings during the day.     (C91.90) CLL (chronic lymphocytic leukemia) (H)  (C81.10) Nodular sclerosing Hodgkin's lymphoma, unspecified body region (H)  (C71.9) Astrocytoma (H)  Comment: blood counts stable. Cancers are not currently active  Plan: continue decadron for his brain tumor. Oncology follow up per usual schedule.     (R00.0) Sinus tachycardia  Comment: rate controlled   Plan: continue metoprolol. Monitor VS    (B37.0) Oral thrush  Comment: resolved after a course of nystatin   Plan: monitor for recurrence     (R53.81) Physical deconditioning  Comment: progressing in therapies   Plan: continue PHYSICAL THERAPY/OT. Goal is to return home with his wife and home care services.       Electronically signed by:  CRISELDA Cohen CNP               Sincerely,        CRISELDA Cohen CNP

## 2019-08-26 NOTE — PROGRESS NOTES
[facility]  [place] [facility] Record Number:  [id number]  Place of Service where encounter took place:  [facility] [[id number]]  Chief Complaint   Patient presents with     RECHECK       HPI:    Shaun Patel  is a 83 year old (1935), who is being seen today for an episodic care visit.  HPI information obtained from: facility chart records, facility staff, patient report and [facility] chart review.  He came to this facility 8/6/2019 from [facility] for short term rehab and medical management following hospitalization after presenting to the ED with left groin pain. He was septic and found to have left  inguinal abscess and cellulitis. Urology consulted and he underwent I&D 7/31/2019 by Dr Bauer. Cultures grew Enterococcus faecalis, Enterococcus species and E coli. ID consulted. He was treated with IV vancomycin,  cefepime and flagyl, then changed to Unasyn. Discharged on Augmentin for 9 days. GI consulted for rectal bleeding and history of hemorrhoids. Patient declined work up and Hgb remained stable in the 9s.   His oncologist, Dr Parry, consulted and his note indicates that none of the patient's cancers are currently active.     Today's concern is:     Abscess of groin, left-he is seen today at the request of the nurse who is concerned that the wound has increased slough and undermining at the edges. Patient denies pain. Completed Augmentin 8/15/2019. Afebrile.   Cellulitis of other specified site  Anemia, unspecified type  Bilateral leg edema-denies cough, shortness of breath or chest pain   CLL (chronic lymphocytic leukemia) (H)  Nodular sclerosing Hodgkin's lymphoma, unspecified body region (H)  Astrocytoma (H)  Sinus tachycardia-HR:    Oral thrush-mouth is less painful.  Good appetite.   Physical deconditioning-ambulating 280 ft with walker and stand by assist. Requires assist of 1 with transfers and cares.   SLUMS 22/30, CPT 5.2/5.6     Past Medical and  "Surgical History reviewed in Epic today.    MEDICATIONS:  Current Outpatient Medications   Medication Sig Dispense Refill     dexamethasone (DECADRON) 2 MG tablet Take 2 mg by mouth 3 times daily       FAMOTIDINE PO Take 10 mg by mouth 2 times daily        FUROSEMIDE PO Take 20 mg by mouth daily       metoprolol succinate ER (TOPROL-XL) 25 MG 24 hr tablet Take 12.5 mg by mouth daily Take 0.5 tablets by mouth once daily       nystatin (MYCOSTATIN) 396359 UNIT/GM external cream Apply topically 3 times daily        Skin Protectants, Misc. (EUCERIN) cream Apply topically as needed for dry skin Apply to bilateral feet and LE topically one time a day for dry skin and Apply to bilateral feet and LE topically as needed for dry skin         REVIEW OF SYSTEMS:  4 point ROS including Respiratory, CV, GI and , other than that noted in the HPI,  is negative    Objective:  /72   Pulse 100   Temp 98.1  F (36.7  C)   Resp 18   Ht 1.803 m (5' 11\")   Wt 95.7 kg (211 lb)   SpO2 96%   BMI 29.43 kg/m    Exam:  GENERAL APPEARANCE:  Alert, in no distress  ENT:   Mouth and posterior oropharynx normal, small sore left side of tongue, moist mucous membranes, [place]  EYES:  EOM normal, conjunctiva and lids normal, PERRL  NECK:  No adenopathy,masses or thyromegaly  RESP:  respiratory effort and palpation of chest normal, lungs clear to auscultation , no respiratory distress  CV:  Palpation and auscultation of heart done , regular rate and rhythm, no  murmur, +2 pedal pulses, peripheral edema trace  both LE  ABDOMEN:   soft, nontender, no hepatosplenomegaly or other masses  M/S:  gait slow with walker.  MUHAMMAD with good strength. No joint inflammation  SKIN:  left groin wound approx 4 x 4 cm, moist with thick dark, slough, mild erythema at wound edges, no drainage, surrounding  Induration.  Fading bruising across lower abdomen   PSYCH:  oriented X 3, memory impaired , affect and mood normal    Labs:   Lab Results   Component Value Date "    WBC 6.4 08/22/2019     Lab Results   Component Value Date    RBC 3.13 08/22/2019     Lab Results   Component Value Date    HGB 10.4 08/22/2019     Lab Results   Component Value Date    HCT 31.1 08/22/2019     Lab Results   Component Value Date    MCV 99 08/22/2019     Lab Results   Component Value Date    MCH 33.2 08/22/2019     Lab Results   Component Value Date    MCHC 33.4 08/22/2019     Lab Results   Component Value Date    RDW 16.8 08/22/2019     Lab Results   Component Value Date    PLT 76 08/22/2019     Last Comprehensive Metabolic Panel:  Sodium   Date Value Ref Range Status   08/22/2019 140 133 - 144 mmol/L Final     Potassium   Date Value Ref Range Status   08/22/2019 3.6 3.4 - 5.3 mmol/L Final     Chloride   Date Value Ref Range Status   08/22/2019 103 94 - 109 mmol/L Final     Carbon Dioxide   Date Value Ref Range Status   08/22/2019 29 20 - 32 mmol/L Final     Anion Gap   Date Value Ref Range Status   08/22/2019 8 3 - 14 mmol/L Final     Glucose   Date Value Ref Range Status   08/22/2019 289 (H) 70 - 99 mg/dL Final     Urea Nitrogen   Date Value Ref Range Status   08/22/2019 30 7 - 30 mg/dL Final     Creatinine   Date Value Ref Range Status   08/22/2019 0.98 0.66 - 1.25 mg/dL Final     GFR Estimate   Date Value Ref Range Status   08/22/2019 71 >60 mL/min/[1.73_m2] Final     Comment:     Non  GFR Calc  Starting 12/18/2018, serum creatinine based estimated GFR (eGFR) will be   calculated using the Chronic Kidney Disease Epidemiology Collaboration   (CKD-EPI) equation.       Calcium   Date Value Ref Range Status   08/22/2019 8.0 (L) 8.5 - 10.1 mg/dL Final       ASSESSMENT / PLAN:  (L02.214) Abscess of groin, left  (primary encounter diagnosis)  (L03.818) Cellulitis of other specified site  Comment: wound with delayed healing.  Infection appears resolved and the area of induration is softer. May benefit from wound vac or light packing with nugauze, but will defer to Dr Bauer.   Plan:  continue wound care with hydrogel and foam dressing. Follow up with Dr Bauer tomorrow.     (D64.9) Anemia, unspecified type  Comment: acute on chronic, Hgb stable   Plan: follow Hgb     (R60.0) Bilateral leg edema  Comment: improved. Renal function at baseline   Plan: continue lasix. Compression stockings during the day.     (C91.90) CLL (chronic lymphocytic leukemia) (H)  (C81.10) Nodular sclerosing Hodgkin's lymphoma, unspecified body region (H)  (C71.9) Astrocytoma (H)  Comment: blood counts stable. Cancers are not currently active  Plan: continue decadron for his brain tumor. Oncology follow up per usual schedule.     (R00.0) Sinus tachycardia  Comment: rate controlled   Plan: continue metoprolol. Monitor VS    (B37.0) Oral thrush  Comment: resolved after a course of nystatin   Plan: monitor for recurrence     (R53.81) Physical deconditioning  Comment: progressing in therapies   Plan: continue PHYSICAL THERAPY/OT. Goal is to return home with his wife and home care services.       Electronically signed by:  CRISELDA Cohen CNP

## 2019-09-04 NOTE — PROGRESS NOTES
[facility]  PRIMARY CARE PROVIDER AND CLINIC:  Jazmine Rachel, [facility] [address]  / [facility] [address]  Chief Complaint   Patient presents with     Hospital F/U     [place] Medical Record Number:  [id number]  [facility] of [facility] where encounter took place[facility] [[id number]]    Shaun Patel  is a 83 year old  (1935), admitted to the above facility from  [facility] . Hospital stay 8/28/2019 through 9/4/2019..  Admitted to this facility for  rehab, medical management and nursing care.    HPI:    HPI information obtained from: facility chart records, facility staff, patient report, [facility] chart review and Care Everywhere [facility] chart review.   Brief Summary of Hospital Course:   He has a complex medical history including CLL, astrocytoma of frontal lobe, nodular sclerosing Hodgkin's lymphoma,  liposarcoma of shoulder, history of focal seizure, chronic anemia,GERD, hospitalization 6/2019 with pneumonia and initially came to this facility 8/6/2019 following hospitalization at [facility] with left groin abscess, s/p I&D 7/31/2019. He was treated with IV antibiotics and discharged on Augmentin.   He had poor wound healing and was rehospitalized 8/28/2019 for I&D  by Dr Curtis. Cultures grew E coli, Proteus and Enterococcus. ID consulted and he was treated with broad spectrum antibiotics. Discharged on Cipro and Augmentin through 9/13/2019.  He was noted to have rising blood sugars and HgbA1c was 9.3. Lantus and Novolog were started for new diagnosis of diabetes type 2. Decadron dose was decreased.  He had rectal bleeding and Hgb dropped to the 9s. He declined GI work up, as he has done during previous hospitalizations.   [facility] nurse consulted and recommended bid wound care with Vashe moistened gauze, covered with ABD     Updates on Status Since Skilled nursing Admission:      Abscess of groin, left  Cellulitis of other specified  site-afebrile.Reports mild pain with wound care and with activity.  Very fatigued. Mild shortness of breath with exertion, no cough or chest pain. No hypoxia.   Type 2 diabetes mellitus with complication, with long-term current use of insulin (H)-blood sugars: 168-318  Good appetite   CLL (chronic lymphocytic leukemia) (H)-followed by Dr Parry.   Nodular sclerosing Hodgkin's lymphoma, unspecified body region (H)  Astrocytoma (H)  Sinus tachycardia  Anemia, unspecified type  Rectal bleeding-he believes this is related to hemorrhoids. Denies recent bleeding.   Bilateral leg edema-denies calf pain   Physical deconditioning-ambulating short distances with walker and contact guard assist. Requires assist of 1 with transfers and cares.     CODE STATUS/ADVANCE DIRECTIVES DISCUSSION:   DNR / DNI  Patient's living condition: lives with spouse  ALLERGIES: Allopurinol; Simvastatin; and Sulfamethoxazole-trimethoprim  PAST MEDICAL HISTORY:  has a past medical history of Astrocytoma (H) (2017), Chronic anemia, CLL (chronic lymphocytic leukemia) (H) (2000), Focal seizure (H) (2017), GERD (gastroesophageal reflux disease), Liposarcoma of left shoulder (H), and Lymphoma (H) (2017).  PAST SURGICAL HISTORY:   has no past surgical history on file.  FAMILY HISTORY: family history is not on file.  SOCIAL HISTORY:       Post Discharge Medication Reconciliation Status: discharge medications reconciled and changed, per note/orders (see AVS)    Current Outpatient Medications   Medication Sig Dispense Refill     acetaminophen (TYLENOL) 325 MG tablet Take 650 mg by mouth every 4 hours as needed for fever or pain       amoxicillin-clavulanate (AUGMENTIN) 875-125 MG tablet Take 1 tablet by mouth 2 times daily       ciprofloxacin (CIPRO) 500 MG tablet Take 500 mg by mouth 2 times daily       dexamethasone (DECADRON) 2 MG tablet Take 2 mg by mouth 2 times daily (with meals)        FUROSEMIDE PO Take 20 mg by mouth every morning        insulin  "aspart (NOVOLOG PEN) 100 UNIT/ML pen Give subcutaneous TID with meals per blood glucose (mg/dL). Don't give corrective dose for PRN, post-prandial or nocturnal glucose checks unless ordered.  Blood Glucose.....Dose   <70...............See Hypoglycemia Protocol   ..........No insulin, give prandial insulin, if ordered  150-199........1 unit  200-249........2 units  250-299........3 units  300-349........4 units  350-399........5 units  400 or greater....6 units & call MD       insulin aspart (NOVOLOG PEN) 100 UNIT/ML pen Inject 3 Units Subcutaneous 3 times daily (with meals) Subcutaneous injection: 3 Units three times daily with meals       insulin glargine (BASAGLAR KWIKPEN) 100 UNIT/ML pen Inject 12 Units Subcutaneous daily        metoprolol succinate ER (TOPROL-XL) 25 MG 24 hr tablet Take 12.5 mg by mouth daily Take 0.5 tablets by mouth once daily       nystatin (MYCOSTATIN) 713131 UNIT/GM external powder Apply topically 2 times daily        pantoprazole (PROTONIX) 20 MG EC tablet Take 40 mg by mouth daily       Skin Protectants, Misc. (EUCERIN) cream Apply topically as needed for dry skin Apply to bilateral feet and LE topically one time a day for dry skin and Apply to bilateral feet and LE topically as needed for dry skin       ROS:  10 point ROS of systems including Constitutional, Eyes, Respiratory, Cardiovascular, Gastroenterology, Genitourinary, Integumentary, Musculoskeletal, Psychiatric were all negative except for pertinent positives noted in my HPI.    Vitals:  /72   Pulse 73   Temp 97.6  F (36.4  C)   Resp 18   Ht 1.803 m (5' 11\")   Wt 96 kg (211 lb 9.6 oz)   SpO2 98%   BMI 29.51 kg/m    Exam:  GENERAL APPEARANCE:  Alert, in no distress  ENT:  Mouth and posterior oropharynx normal, moist mucous membranes, [place]  EYES:  EOM normal, conjunctiva and lids normal, PERRL  NECK:  No adenopathy,masses or thyromegaly  RESP:  respiratory effort and palpation of chest normal, lungs clear to " auscultation , no respiratory distress  CV:  Palpation and auscultation of heart done , regular rate and rhythm, no  murmur, +2 pedal pulses, peripheral edema 1+ in both LE  ABDOMEN:  normal bowel sounds, soft, nontender, no hepatosplenomegaly or other masses  M/S:   in bed. MUHAMMAD with good strength. No joint inflammation  SKIN:  left groin wound approx 4 x 4 x 1.5 cm with granulation and scant slough. Mild surrounding  induration and mild erythema. Mild erythema of omid area. Fading bruising across lower abdomen   PSYCH:  oriented X 3, memory impaired , affect and mood normal    Lab/Diagnostic data:  Recent labs in [facility] reviewed by me today.     ASSESSMENT / PLAN:  (L02.214) Abscess of groin, left  (primary encounter diagnosis)  (L03.818) Cellulitis of other specified site   Comment: wound appears improved with good granulation and smaller area of induration. Very fatigued and deconditioned.   Plan: continue Augmentin and cipro with anticipated stop date of 9/13/2019. Wound care bid. Follow up [facility] 9/16 and Urology 9/30/2019.     (E11.8,  Z79.4) Type 2 diabetes mellitus with complication, with long-term current use of insulin (H)  Comment: new diagnosis. Chronic steroid use is contributing to hyperglycemia.   Plan: increase Lantus to 12 units daily, continue Novolog 3 units with meals plus sliding scale. Monitor blood sugars. Dietician to consult. Patient teaching re: blood sugar checks and insulin administration.     (C91.90) CLL (chronic lymphocytic leukemia) (H)  (C81.10) Nodular sclerosing Hodgkin's lymphoma, unspecified body region (H)  (C71.9) Astrocytoma (H)  Comment: cancers not currently active, per recent note from Oncology. Decadron decreased from qid to bid due to hyperglycemia   Plan: continue decadron. Oncology follow up per usual schedule.     (R00.0) Sinus tachycardia  Comment: rate controlled   Plan: continue metoprolol. Monitor VS     (D64.9) Anemia, unspecified type  Comment: acute on  chronic.   Plan: CBC    (K62.5) Rectal bleeding  Comment: possibly due to hemorrhoids. No recent episodes reported. He declines GI work up.   Plan: bowel regimen. Follow Hgb     (R60.0) Bilateral leg edema  Comment: improved  Plan: compression stockings and elevate legs    (R53.81) Physical deconditioning  Comment: weak with poor endurance after recent hospital stay   Plan: PHYSICAL THERAPY/OT. Goal is to return home with his wife and home services. AL has been discussed with family.       Total time spent with patient visit at the skilled nursing facility was 38 min including patient visit and review of past records. Greater than 50% of total time spent with counseling and coordinating care due to coordinating care with facility staff on admission orders, coordinating care with follow up labs and appointments and counseling patient/resident for 20 minutes on: the reason for hospitalization and the treatment the plan of care and projected length of SNF stay,current medications (treatments) reconciled from the hospital and recent lab and imaging results and subsequent treatment plan.     Electronically signed by:  CRISELDA Cohen CNP

## 2019-09-05 ENCOUNTER — NURSING HOME VISIT (OUTPATIENT)
Dept: GERIATRICS | Facility: CLINIC | Age: 84
End: 2019-09-05
Payer: COMMERCIAL

## 2019-09-05 VITALS
HEIGHT: 71 IN | TEMPERATURE: 97.6 F | HEART RATE: 73 BPM | SYSTOLIC BLOOD PRESSURE: 107 MMHG | OXYGEN SATURATION: 98 % | BODY MASS INDEX: 29.62 KG/M2 | RESPIRATION RATE: 18 BRPM | DIASTOLIC BLOOD PRESSURE: 72 MMHG | WEIGHT: 211.6 LBS

## 2019-09-05 DIAGNOSIS — L02.214 ABSCESS OF GROIN, LEFT: Primary | ICD-10-CM

## 2019-09-05 DIAGNOSIS — K62.5 RECTAL BLEEDING: ICD-10-CM

## 2019-09-05 DIAGNOSIS — C91.10 CLL (CHRONIC LYMPHOCYTIC LEUKEMIA) (H): ICD-10-CM

## 2019-09-05 DIAGNOSIS — C71.9 ASTROCYTOMA (H): ICD-10-CM

## 2019-09-05 DIAGNOSIS — R00.0 SINUS TACHYCARDIA: ICD-10-CM

## 2019-09-05 DIAGNOSIS — C81.10 NODULAR SCLEROSING HODGKIN'S LYMPHOMA, UNSPECIFIED BODY REGION (H): ICD-10-CM

## 2019-09-05 DIAGNOSIS — L03.818 CELLULITIS OF OTHER SPECIFIED SITE: ICD-10-CM

## 2019-09-05 DIAGNOSIS — Z79.4 TYPE 2 DIABETES MELLITUS WITH COMPLICATION, WITH LONG-TERM CURRENT USE OF INSULIN (H): ICD-10-CM

## 2019-09-05 DIAGNOSIS — E11.8 TYPE 2 DIABETES MELLITUS WITH COMPLICATION, WITH LONG-TERM CURRENT USE OF INSULIN (H): ICD-10-CM

## 2019-09-05 DIAGNOSIS — R53.81 PHYSICAL DECONDITIONING: ICD-10-CM

## 2019-09-05 DIAGNOSIS — D64.9 ANEMIA, UNSPECIFIED TYPE: ICD-10-CM

## 2019-09-05 DIAGNOSIS — R60.0 BILATERAL LEG EDEMA: ICD-10-CM

## 2019-09-05 PROCEDURE — 99310 SBSQ NF CARE HIGH MDM 45: CPT | Performed by: NURSE PRACTITIONER

## 2019-09-05 RX ORDER — CIPROFLOXACIN 500 MG/1
500 TABLET, FILM COATED ORAL 2 TIMES DAILY
COMMUNITY
Start: 2019-09-04 | End: 2019-09-16

## 2019-09-05 RX ORDER — ACETAMINOPHEN 325 MG/1
650 TABLET ORAL EVERY 4 HOURS PRN
COMMUNITY

## 2019-09-05 RX ORDER — ACETIC ACID 3 %
LIQUID (ML) MISCELLANEOUS 2 TIMES DAILY
COMMUNITY
End: 2019-09-05

## 2019-09-05 RX ORDER — INSULIN GLARGINE 100 [IU]/ML
10 INJECTION, SOLUTION SUBCUTANEOUS AT BEDTIME
COMMUNITY

## 2019-09-05 RX ORDER — PANTOPRAZOLE SODIUM 20 MG/1
40 TABLET, DELAYED RELEASE ORAL DAILY
COMMUNITY

## 2019-09-05 ASSESSMENT — MIFFLIN-ST. JEOR: SCORE: 1676.94

## 2019-09-05 NOTE — LETTER
9/5/2019        RE: Shaun Patel  [address]  [facility] [address]        [place] [facility]  PRIMARY CARE PROVIDER AND CLINIC:  Jazmine Rachel, [facility] [address]  / [facility] [address]  Chief Complaint   Patient presents with     Hospital F/U     [place] Medical Record Number:  [id number]  Place of Service where encounter took place:  [facility] [[id number]]    Shaun Patel  is a 83 year old  (1935), admitted to the above facility from  [facility] . Hospital stay 8/28/2019 through 9/4/2019..  Admitted to this facility for  rehab, medical management and nursing care.    HPI:    HPI information obtained from: facility chart records, facility staff, patient report, [facility] chart review and Care Everywhere [facility] chart review.   Brief Summary of Hospital Course:   He has a complex medical history including CLL, astrocytoma of frontal lobe, nodular sclerosing Hodgkin's lymphoma,  liposarcoma of shoulder, history of focal seizure, chronic anemia,GERD, hospitalization 6/2019 with pneumonia and initially came to this facility 8/6/2019 following hospitalization at [facility] with left groin abscess, s/p I&D 7/31/2019. He was treated with IV antibiotics and discharged on Augmentin.   He had poor wound healing and was rehospitalized 8/28/2019 for I&D  by Dr Curtis. Cultures grew E coli, Proteus and Enterococcus. ID consulted and he was treated with broad spectrum antibiotics. Discharged on Cipro and Augmentin through 9/13/2019.  He was noted to have rising blood sugars and HgbA1c was 9.3. Lantus and Novolog were started for new diagnosis of diabetes type 2. Decadron dose was decreased.  He had rectal bleeding and Hgb dropped to the 9s. He declined GI work up, as he has done during previous hospitalizations.   [facility] nurse consulted and recommended bid wound care with Vashe moistened gauze, covered with ABD     Updates on Status Since Skilled nursing  Admission:      Abscess of groin, left  Cellulitis of other specified site-afebrile.Reports mild pain with wound care and with activity.  Very fatigued. Mild shortness of breath with exertion, no cough or chest pain. No hypoxia.   Type 2 diabetes mellitus with complication, with long-term current use of insulin (H)-blood sugars: 168-318  Good appetite   CLL (chronic lymphocytic leukemia) (H)-followed by Dr Parry.   Nodular sclerosing Hodgkin's lymphoma, unspecified body region (H)  Astrocytoma (H)  Sinus tachycardia  Anemia, unspecified type  Rectal bleeding-he believes this is related to hemorrhoids. Denies recent bleeding.   Bilateral leg edema-denies calf pain   Physical deconditioning-ambulating short distances with walker and contact guard assist. Requires assist of 1 with transfers and cares.     CODE STATUS/ADVANCE DIRECTIVES DISCUSSION:   DNR / DNI  Patient's living condition: lives with spouse  ALLERGIES: Allopurinol; Simvastatin; and Sulfamethoxazole-trimethoprim  PAST MEDICAL HISTORY:  has a past medical history of Astrocytoma (H) (2017), Chronic anemia, CLL (chronic lymphocytic leukemia) (H) (2000), Focal seizure (H) (2017), GERD (gastroesophageal reflux disease), Liposarcoma of left shoulder (H), and Lymphoma (H) (2017).  PAST SURGICAL HISTORY:   has no past surgical history on file.  FAMILY HISTORY: family history is not on file.  SOCIAL HISTORY:       Post Discharge Medication Reconciliation Status: discharge medications reconciled and changed, per note/orders (see AVS)    Current Outpatient Medications   Medication Sig Dispense Refill     acetaminophen (TYLENOL) 325 MG tablet Take 650 mg by mouth every 4 hours as needed for fever or pain       amoxicillin-clavulanate (AUGMENTIN) 875-125 MG tablet Take 1 tablet by mouth 2 times daily       ciprofloxacin (CIPRO) 500 MG tablet Take 500 mg by mouth 2 times daily       dexamethasone (DECADRON) 2 MG tablet Take 2 mg by mouth 2 times daily (with meals)     "    FUROSEMIDE PO Take 20 mg by mouth every morning        insulin aspart (NOVOLOG PEN) 100 UNIT/ML pen Give subcutaneous TID with meals per blood glucose (mg/dL). Don't give corrective dose for PRN, post-prandial or nocturnal glucose checks unless ordered.  Blood Glucose.....Dose   <70...............See Hypoglycemia Protocol   ..........No insulin, give prandial insulin, if ordered  150-199........1 unit  200-249........2 units  250-299........3 units  300-349........4 units  350-399........5 units  400 or greater....6 units & call MD       insulin aspart (NOVOLOG PEN) 100 UNIT/ML pen Inject 3 Units Subcutaneous 3 times daily (with meals) Subcutaneous injection: 3 Units three times daily with meals       insulin glargine (BASAGLAR KWIKPEN) 100 UNIT/ML pen Inject 12 Units Subcutaneous daily        metoprolol succinate ER (TOPROL-XL) 25 MG 24 hr tablet Take 12.5 mg by mouth daily Take 0.5 tablets by mouth once daily       nystatin (MYCOSTATIN) 550577 UNIT/GM external powder Apply topically 2 times daily        pantoprazole (PROTONIX) 20 MG EC tablet Take 40 mg by mouth daily       Skin Protectants, Misc. (EUCERIN) cream Apply topically as needed for dry skin Apply to bilateral feet and LE topically one time a day for dry skin and Apply to bilateral feet and LE topically as needed for dry skin       ROS:  10 point ROS of systems including Constitutional, Eyes, Respiratory, Cardiovascular, Gastroenterology, Genitourinary, Integumentary, Musculoskeletal, Psychiatric were all negative except for pertinent positives noted in my HPI.    Vitals:  /72   Pulse 73   Temp 97.6  F (36.4  C)   Resp 18   Ht 1.803 m (5' 11\")   Wt 96 kg (211 lb 9.6 oz)   SpO2 98%   BMI 29.51 kg/m     Exam:  GENERAL APPEARANCE:  Alert, in no distress  ENT:  Mouth and posterior oropharynx normal, moist mucous membranes, [place]  EYES:  EOM normal, conjunctiva and lids normal, PERRL  NECK:  No adenopathy,masses or thyromegaly  RESP:  " respiratory effort and palpation of chest normal, lungs clear to auscultation , no respiratory distress  CV:  Palpation and auscultation of heart done , regular rate and rhythm, no  murmur, +2 pedal pulses, peripheral edema 1+ in both LE  ABDOMEN:  normal bowel sounds, soft, nontender, no hepatosplenomegaly or other masses  M/S:   in bed. MUHAMMAD with good strength. No joint inflammation  SKIN:  left groin wound approx 4 x 4 x 1.5 cm with granulation and scant slough. Mild surrounding  induration and mild erythema. Mild erythema of omid area. Fading bruising across lower abdomen   PSYCH:  oriented X 3, memory impaired , affect and mood normal    Lab/Diagnostic data:  Recent labs in [facility] reviewed by me today.     ASSESSMENT / PLAN:  (L02.214) Abscess of groin, left  (primary encounter diagnosis)  (L03.818) Cellulitis of other specified site   Comment: wound appears improved with good granulation and smaller area of induration. Very fatigued and deconditioned.   Plan: continue Augmentin and cipro with anticipated stop date of 9/13/2019. Wound care bid. Follow up Wound Clinic 9/16 and Urology 9/30/2019.     (E11.8,  Z79.4) Type 2 diabetes mellitus with complication, with long-term current use of insulin (H)  Comment: new diagnosis. Chronic steroid use is contributing to hyperglycemia.   Plan: increase Lantus to 12 units daily, continue Novolog 3 units with meals plus sliding scale. Monitor blood sugars. Dietician to consult. Patient teaching re: blood sugar checks and insulin administration.     (C91.90) CLL (chronic lymphocytic leukemia) (H)  (C81.10) Nodular sclerosing Hodgkin's lymphoma, unspecified body region (H)  (C71.9) Astrocytoma (H)  Comment: cancers not currently active, per recent note from Oncology. Decadron decreased from qid to bid due to hyperglycemia   Plan: continue decadron. Oncology follow up per usual schedule.     (R00.0) Sinus tachycardia  Comment: rate controlled   Plan: continue metoprolol. Monitor  VS     (D64.9) Anemia, unspecified type  Comment: acute on chronic.   Plan: CBC    (K62.5) Rectal bleeding  Comment: possibly due to hemorrhoids. No recent episodes reported. He declines GI work up.   Plan: bowel regimen. Follow Hgb     (R60.0) Bilateral leg edema  Comment: improved  Plan: compression stockings and elevate legs    (R53.81) Physical deconditioning  Comment: weak with poor endurance after recent hospital stay   Plan: PHYSICAL THERAPY/OT. Goal is to return home with his wife and home services. AL has been discussed with family.       Total time spent with patient visit at the skilled nursing facility was 38 min including patient visit and review of past records. Greater than 50% of total time spent with counseling and coordinating care due to coordinating care with facility staff on admission orders, coordinating care with follow up labs and appointments and counseling patient/resident for 20 minutes on: the reason for hospitalization and the treatment the plan of care and projected length of SNF stay,current medications (treatments) reconciled from the hospital and recent lab and imaging results and subsequent treatment plan.     Electronically signed by:  CRISELDA Cohen CNP                         Sincerely,        CRISELDA Cohen CNP

## 2019-09-06 ENCOUNTER — NURSING HOME VISIT (OUTPATIENT)
Dept: GERIATRICS | Facility: CLINIC | Age: 84
End: 2019-09-06
Payer: COMMERCIAL

## 2019-09-06 VITALS
TEMPERATURE: 97.6 F | BODY MASS INDEX: 29.29 KG/M2 | HEIGHT: 71 IN | DIASTOLIC BLOOD PRESSURE: 75 MMHG | RESPIRATION RATE: 18 BRPM | SYSTOLIC BLOOD PRESSURE: 136 MMHG | OXYGEN SATURATION: 98 % | WEIGHT: 209.2 LBS | HEART RATE: 118 BPM

## 2019-09-06 VITALS
HEIGHT: 71 IN | BODY MASS INDEX: 29.29 KG/M2 | DIASTOLIC BLOOD PRESSURE: 75 MMHG | SYSTOLIC BLOOD PRESSURE: 136 MMHG | WEIGHT: 209.2 LBS | TEMPERATURE: 97.6 F | HEART RATE: 118 BPM | RESPIRATION RATE: 18 BRPM | OXYGEN SATURATION: 98 %

## 2019-09-06 DIAGNOSIS — D64.9 ANEMIA, UNSPECIFIED TYPE: ICD-10-CM

## 2019-09-06 DIAGNOSIS — C71.9 ASTROCYTOMA (H): ICD-10-CM

## 2019-09-06 DIAGNOSIS — E11.8 TYPE 2 DIABETES MELLITUS WITH COMPLICATION, WITH LONG-TERM CURRENT USE OF INSULIN (H): ICD-10-CM

## 2019-09-06 DIAGNOSIS — R53.81 PHYSICAL DECONDITIONING: ICD-10-CM

## 2019-09-06 DIAGNOSIS — Z79.4 TYPE 2 DIABETES MELLITUS WITH COMPLICATION, WITH LONG-TERM CURRENT USE OF INSULIN (H): ICD-10-CM

## 2019-09-06 DIAGNOSIS — L03.818 CELLULITIS OF OTHER SPECIFIED SITE: ICD-10-CM

## 2019-09-06 DIAGNOSIS — C81.10 NODULAR SCLEROSING HODGKIN'S LYMPHOMA, UNSPECIFIED BODY REGION (H): ICD-10-CM

## 2019-09-06 DIAGNOSIS — C91.10 CLL (CHRONIC LYMPHOCYTIC LEUKEMIA) (H): ICD-10-CM

## 2019-09-06 DIAGNOSIS — R60.0 BILATERAL LEG EDEMA: ICD-10-CM

## 2019-09-06 DIAGNOSIS — R00.0 SINUS TACHYCARDIA: ICD-10-CM

## 2019-09-06 DIAGNOSIS — L02.214 ABSCESS OF GROIN, LEFT: Primary | ICD-10-CM

## 2019-09-06 PROCEDURE — 99309 SBSQ NF CARE MODERATE MDM 30: CPT | Performed by: NURSE PRACTITIONER

## 2019-09-06 ASSESSMENT — MIFFLIN-ST. JEOR
SCORE: 1666.05
SCORE: 1666.05

## 2019-09-06 NOTE — PROGRESS NOTES
[place] [facility]  [place] [facility] Record Number:  [id number]  Place of Service where encounter took place:  [facility] [[id number]]  Chief Complaint   Patient presents with     RECHECK       HPI:    Shaun Patel  is a 83 year old (1935), who is being seen today for an episodic care visit.  HPI information obtained from: facility chart records, facility staff, patient report, [facility] chart review and Care Everywhere [facility] chart review.   He initially came to this facility 8/6/2019 following hospitalization at [facility] with left groin abscess, s/p I&D 7/31/2019. He was treated with IV antibiotics and discharged on Augmentin.   He had poor wound healing and was rehospitalized 8/28/2019 for I&D  by Dr Curtis. Cultures grew E coli, Proteus and Enterococcus. ID consulted and he was treated with broad spectrum antibiotics. Discharged on Cipro and Augmentin through 9/13/2019.  He was noted to have rising blood sugars and HgbA1c was 9.3. Lantus and Novolog were started for new diagnosis of diabetes type 2. Decadron dose was decreased.  He had rectal bleeding and Hgb dropped to the 9s. He declined GI work up, as he has done during previous hospitalizations. [facility] nurse consulted and recommended bid wound care with Vashe moistened gauze, covered with ABD. He returned to the facility 9/4/2019 for ongoing rehab and medical management.       Today's concerns are:      Abscess of groin, left-afebrile. Reports minimal pain.   Very fatigued today and had increased shortness of breath with ambulation this am. No hypoxia, cough or chest pain.   CXR obtained and shows chronic lung changes, no infiltrate, effusion or pulmonary edema.   Cellulitis of other specified site  Type 2 diabetes mellitus with complication, with long-term current use of insulin (H)-Lantus increased yesterday for blood sugars 168-318. Blood sugars 229 this morning. Good appetite.   CLL (chronic lymphocytic leukemia)  (H)  Nodular sclerosing Hodgkin's lymphoma, unspecified body region (H)  Astrocytoma (H)  Sinus tachycardia-HR: 76-83   Anemia, unspecified type  Bilateral leg edema-wearing compression stockings   Physical deconditioning-ambulating short distances with walker and contact guard assist. Requires assist of 1 with transfers and cares.     Past Medical and Surgical History reviewed in Epic today.    MEDICATIONS:  Current Outpatient Medications   Medication Sig Dispense Refill     acetaminophen (TYLENOL) 325 MG tablet Take 650 mg by mouth every 4 hours as needed for fever or pain       amoxicillin-clavulanate (AUGMENTIN) 875-125 MG tablet Take 1 tablet by mouth 2 times daily       ciprofloxacin (CIPRO) 500 MG tablet Take 500 mg by mouth 2 times daily       dexamethasone (DECADRON) 2 MG tablet Take 2 mg by mouth 2 times daily (with meals)        FUROSEMIDE PO Take 20 mg by mouth every morning        insulin aspart (NOVOLOG PEN) 100 UNIT/ML pen Give subcutaneous TID with meals per blood glucose (mg/dL). Don't give corrective dose for PRN, post-prandial or nocturnal glucose checks unless ordered.  Blood Glucose.....Dose   <70...............See Hypoglycemia Protocol   ..........No insulin, give prandial insulin, if ordered  150-199........1 unit  200-249........2 units  250-299........3 units  300-349........4 units  350-399........5 units  400 or greater....6 units & call MD       insulin aspart (NOVOLOG PEN) 100 UNIT/ML pen Inject 3 Units Subcutaneous 3 times daily (with meals) Subcutaneous injection: 3 Units three times daily with meals       insulin glargine (BASAGLAR KWIKPEN) 100 UNIT/ML pen Inject 12 Units Subcutaneous daily        metoprolol succinate ER (TOPROL-XL) 25 MG 24 hr tablet Take 12.5 mg by mouth daily Take 0.5 tablets by mouth once daily       nystatin (MYCOSTATIN) 397652 UNIT/GM external powder Apply topically 2 times daily        pantoprazole (PROTONIX) 20 MG EC tablet Take 40 mg by mouth daily    "    Skin Protectants, Misc. (EUCERIN) cream Apply topically as needed for dry skin Apply to bilateral feet and LE topically one time a day for dry skin and Apply to bilateral feet and LE topically as needed for dry skin       REVIEW OF SYSTEMS:  4 point ROS including Respiratory, CV, GI and , other than that noted in the HPI,  is negative    Objective:  /75   Pulse 118   Temp 97.6  F (36.4  C)   Resp 18   Ht 1.803 m (5' 11\")   Wt 94.9 kg (209 lb 3.2 oz)   SpO2 98%   BMI 29.18 kg/m    Exam:  GENERAL APPEARANCE:  Alert, in no distress  ENT:  Mouth and posterior oropharynx normal, moist mucous membranes, [place]  EYES:  EOM normal, conjunctiva and lids normal, PERRL  NECK:  No adenopathy,masses or thyromegaly  RESP:  respiratory effort and palpation of chest normal, lungs clear to auscultation , no respiratory distress  CV:  Palpation and auscultation of heart done , regular rate and rhythm, no  murmur, +2 pedal pulses, peripheral edema  trace-1+  in both LE  ABDOMEN: soft, nontender, no hepatosplenomegaly or other masses  M/S:   in bed. MUHAMMAD with good strength. No joint inflammation  SKIN:  left groin wound with granulation and scant slough. Mild surrounding  induration and mild erythema. Mild erythema of omid area. Fading bruising across lower abdomen   PSYCH:  oriented X 3, memory impaired , affect and mood normal    Labs:   Recent labs in [facility] reviewed by me today.     ASSESSMENT / PLAN:  (L02.214) Abscess of groin, left  (primary encounter diagnosis)  (L03.818) Cellulitis of other specified site  Comment: wound improve.   Mild shortness of breath with exertion today, probably due to fatigue and general deconditioning. CXR negative and no  s/s of PE.   Plan: continue Augmentin and cipro with anticipated stop date of 9/13/2019. Wound care bid. Follow up Wound Clinic 9/16 and Urology 9/30/2019.   Closely monitor respiratory status and if worse, send to ED for CT-discussed with staff.     (E11.8,  Z79.4) " Type 2 diabetes mellitus with complication, with long-term current use of insulin (H)  Comment: new diagnosis. Lantus increased yesterday   Plan: continue Lantus and Novolog. Monitor blood sugars. Patient education re: diet, checking blood sugars, self admin of insulin.     (C91.90) CLL (chronic lymphocytic leukemia) (H)  (C81.10) Nodular sclerosing Hodgkin's lymphoma, unspecified body region (H)  (C71.9) Astrocytoma (H)  Comment: cancers not currently active, per recent note from Oncology. Decadron decreased from qid to bid due to hyperglycemia   Plan: continue decadron. Oncology follow up per usual schedule.     (R00.0) Sinus tachycardia  Comment: rate controlled   Plan: continue metoprolol. Monitor VS     (D64.9) Anemia, unspecified type  Comment: acute on chronic   Plan: CBC    (R60.0) Bilateral leg edema  Comment: improved   Plan: continue compression stockings and elevate legs     (R53.81) Physical deconditioning  Comment: slow progress in therapies.   Plan: continue PHYSICAL THERAPY/OT. Goal is to return home with his wife and home services. AL has been discussed with family.       Electronically signed by:  CRISELDA Cohen CNP

## 2019-09-06 NOTE — LETTER
9/6/2019        RE: Shaun Patel  [address]  [facility] [address]        [place] [facility]  [place] [facility] Record Number:  [id number]  [facility] of Service where encounter took place[facility] [[id number]]  Chief Complaint   Patient presents with     RECHECK       HPI:    Shaun Patel  is a 83 year old (1935), who is being seen today for an episodic care visit.  HPI information obtained from: facility chart records, facility staff, patient report, [facility] chart review and Care Everywhere [facility] chart review.   He initially came to this facility 8/6/2019 following hospitalization at [facility] with left groin abscess, s/p I&D 7/31/2019. He was treated with IV antibiotics and discharged on Augmentin.   He had poor wound healing and was rehospitalized 8/28/2019 for I&D  by Dr Curtis. Cultures grew E coli, Proteus and Enterococcus. ID consulted and he was treated with broad spectrum antibiotics. Discharged on Cipro and Augmentin through 9/13/2019.  He was noted to have rising blood sugars and HgbA1c was 9.3. Lantus and Novolog were started for new diagnosis of diabetes type 2. Decadron dose was decreased.  He had rectal bleeding and Hgb dropped to the 9s. He declined GI work up, as he has done during previous hospitalizations. [facility] nurse consulted and recommended bid wound care with Vashe moistened gauze, covered with ABD. He returned to the facility 9/4/2019 for ongoing rehab and medical management.       Today's concerns are:      Abscess of groin, left-afebrile. Reports minimal pain.   Very fatigued today and had increased shortness of breath with ambulation this am. No hypoxia, cough or chest pain.   CXR obtained and shows chronic lung changes, no infiltrate, effusion or pulmonary edema.   Cellulitis of other specified site  Type 2 diabetes mellitus with complication, with long-term current use of insulin (H)-Lantus increased yesterday for blood sugars 168-318. Blood  sugars 229 this morning. Good appetite.   CLL (chronic lymphocytic leukemia) (H)  Nodular sclerosing Hodgkin's lymphoma, unspecified body region (H)  Astrocytoma (H)  Sinus tachycardia-HR: 76-83   Anemia, unspecified type  Bilateral leg edema-wearing compression stockings   Physical deconditioning-ambulating short distances with walker and contact guard assist. Requires assist of 1 with transfers and cares.     Past Medical and Surgical History reviewed in Epic today.    MEDICATIONS:  Current Outpatient Medications   Medication Sig Dispense Refill     acetaminophen (TYLENOL) 325 MG tablet Take 650 mg by mouth every 4 hours as needed for fever or pain       amoxicillin-clavulanate (AUGMENTIN) 875-125 MG tablet Take 1 tablet by mouth 2 times daily       ciprofloxacin (CIPRO) 500 MG tablet Take 500 mg by mouth 2 times daily       dexamethasone (DECADRON) 2 MG tablet Take 2 mg by mouth 2 times daily (with meals)        FUROSEMIDE PO Take 20 mg by mouth every morning        insulin aspart (NOVOLOG PEN) 100 UNIT/ML pen Give subcutaneous TID with meals per blood glucose (mg/dL). Don't give corrective dose for PRN, post-prandial or nocturnal glucose checks unless ordered.  Blood Glucose.....Dose   <70...............See Hypoglycemia Protocol   ..........No insulin, give prandial insulin, if ordered  150-199........1 unit  200-249........2 units  250-299........3 units  300-349........4 units  350-399........5 units  400 or greater....6 units & call MD       insulin aspart (NOVOLOG PEN) 100 UNIT/ML pen Inject 3 Units Subcutaneous 3 times daily (with meals) Subcutaneous injection: 3 Units three times daily with meals       insulin glargine (BASAGLAR KWIKPEN) 100 UNIT/ML pen Inject 12 Units Subcutaneous daily        metoprolol succinate ER (TOPROL-XL) 25 MG 24 hr tablet Take 12.5 mg by mouth daily Take 0.5 tablets by mouth once daily       nystatin (MYCOSTATIN) 410421 UNIT/GM external powder Apply topically 2 times daily   "      pantoprazole (PROTONIX) 20 MG EC tablet Take 40 mg by mouth daily       Skin Protectants, Misc. (EUCERIN) cream Apply topically as needed for dry skin Apply to bilateral feet and LE topically one time a day for dry skin and Apply to bilateral feet and LE topically as needed for dry skin       REVIEW OF SYSTEMS:  4 point ROS including Respiratory, CV, GI and , other than that noted in the HPI,  is negative    Objective:  /75   Pulse 118   Temp 97.6  F (36.4  C)   Resp 18   Ht 1.803 m (5' 11\")   Wt 94.9 kg (209 lb 3.2 oz)   SpO2 98%   BMI 29.18 kg/m     Exam:  GENERAL APPEARANCE:  Alert, in no distress  ENT:  Mouth and posterior oropharynx normal, moist mucous membranes, [place]  EYES:  EOM normal, conjunctiva and lids normal, PERRL  NECK:  No adenopathy,masses or thyromegaly  RESP:  respiratory effort and palpation of chest normal, lungs clear to auscultation , no respiratory distress  CV:  Palpation and auscultation of heart done , regular rate and rhythm, no  murmur, +2 pedal pulses, peripheral edema   trace-1+  in both LE  ABDOMEN: soft, nontender, no hepatosplenomegaly or other masses  M/S:   in bed. MUHAMMAD with good strength. No joint inflammation  SKIN:  left groin wound with granulation and scant slough. Mild surrounding  induration and mild erythema. Mild erythema of omid area. Fading bruising across lower abdomen   PSYCH:  oriented X 3, memory impaired , affect and mood normal    Labs:   Recent labs in [facility] reviewed by me today.     ASSESSMENT / PLAN:  (L02.214) Abscess of groin, left  (primary encounter diagnosis)  (L03.818) Cellulitis of other specified site  Comment: wound  improve.   Mild shortness of breath with exertion today, probably due to fatigue and general deconditioning. CXR negative and no  s/s of PE.   Plan: continue Augmentin and cipro with anticipated stop date of 9/13/2019. Wound care bid. Follow up Wound Clinic 9/16 and Urology 9/30/2019.   Closely monitor respiratory status " and if worse, send to ED for CT-discussed with staff.     (E11.8,  Z79.4) Type 2 diabetes mellitus with complication, with long-term current use of insulin (H)  Comment: new diagnosis. Lantus increased yesterday   Plan: continue Lantus and Novolog. Monitor blood sugars. Patient education re: diet, checking blood sugars, self admin of insulin.     (C91.90) CLL (chronic lymphocytic leukemia) (H)  (C81.10) Nodular sclerosing Hodgkin's lymphoma, unspecified body region (H)  (C71.9) Astrocytoma (H)  Comment: cancers not currently active, per recent note from Oncology. Decadron decreased from qid to bid due to hyperglycemia   Plan: continue decadron. Oncology follow up per usual schedule.     (R00.0) Sinus tachycardia  Comment: rate controlled   Plan: continue metoprolol. Monitor VS     (D64.9) Anemia, unspecified type  Comment: acute on chronic   Plan: CBC    (R60.0) Bilateral leg edema  Comment: improved   Plan: continue compression stockings and elevate legs     (R53.81) Physical deconditioning  Comment: slow progress in therapies.   Plan: continue PHYSICAL THERAPY/OT. Goal is to return home with his wife and home services. AL has been discussed with family.       Electronically signed by:  CRISELDA Cohen CNP               Sincerely,        CRISELDA Cohen CNP

## 2019-09-09 ENCOUNTER — HOSPITAL LABORATORY (OUTPATIENT)
Dept: OTHER | Facility: CLINIC | Age: 84
End: 2019-09-09

## 2019-09-09 ENCOUNTER — NURSING HOME VISIT (OUTPATIENT)
Dept: GERIATRICS | Facility: CLINIC | Age: 84
End: 2019-09-09
Payer: COMMERCIAL

## 2019-09-09 DIAGNOSIS — C91.10 CLL (CHRONIC LYMPHOCYTIC LEUKEMIA) (H): ICD-10-CM

## 2019-09-09 DIAGNOSIS — E11.8 TYPE 2 DIABETES MELLITUS WITH COMPLICATION, UNSPECIFIED WHETHER LONG TERM INSULIN USE: ICD-10-CM

## 2019-09-09 DIAGNOSIS — R60.0 BILATERAL LEG EDEMA: ICD-10-CM

## 2019-09-09 DIAGNOSIS — R53.81 PHYSICAL DECONDITIONING: Primary | ICD-10-CM

## 2019-09-09 DIAGNOSIS — R00.0 SINUS TACHYCARDIA: ICD-10-CM

## 2019-09-09 DIAGNOSIS — K86.89 PANCREATIC MASS: ICD-10-CM

## 2019-09-09 DIAGNOSIS — C81.10 NODULAR SCLEROSING HODGKIN'S LYMPHOMA, UNSPECIFIED BODY REGION (H): ICD-10-CM

## 2019-09-09 DIAGNOSIS — L02.214 ABSCESS OF GROIN, LEFT: ICD-10-CM

## 2019-09-09 DIAGNOSIS — D64.9 ANEMIA, UNSPECIFIED TYPE: ICD-10-CM

## 2019-09-09 LAB
ANION GAP SERPL CALCULATED.3IONS-SCNC: 9 MMOL/L (ref 3–14)
BUN SERPL-MCNC: 34 MG/DL (ref 7–30)
CALCIUM SERPL-MCNC: 8.3 MG/DL (ref 8.5–10.1)
CHLORIDE SERPL-SCNC: 104 MMOL/L (ref 94–109)
CO2 SERPL-SCNC: 28 MMOL/L (ref 20–32)
CREAT SERPL-MCNC: 1.27 MG/DL (ref 0.66–1.25)
ERYTHROCYTE [DISTWIDTH] IN BLOOD BY AUTOMATED COUNT: 16 % (ref 10–15)
GFR SERPL CREATININE-BSD FRML MDRD: 52 ML/MIN/{1.73_M2}
GLUCOSE SERPL-MCNC: 191 MG/DL (ref 70–99)
HCT VFR BLD AUTO: 35.5 % (ref 40–53)
HGB BLD-MCNC: 11.8 G/DL (ref 13.3–17.7)
MCH RBC QN AUTO: 32.9 PG (ref 26.5–33)
MCHC RBC AUTO-ENTMCNC: 33.2 G/DL (ref 31.5–36.5)
MCV RBC AUTO: 99 FL (ref 78–100)
PLATELET # BLD AUTO: 126 10E9/L (ref 150–450)
POTASSIUM SERPL-SCNC: 3.4 MMOL/L (ref 3.4–5.3)
RBC # BLD AUTO: 3.59 10E12/L (ref 4.4–5.9)
SODIUM SERPL-SCNC: 141 MMOL/L (ref 133–144)
WBC # BLD AUTO: 13.3 10E9/L (ref 4–11)

## 2019-09-09 PROCEDURE — 99306 1ST NF CARE HIGH MDM 50: CPT | Performed by: INTERNAL MEDICINE

## 2019-09-09 NOTE — LETTER
9/9/2019        RE: Shaun Patel  [address]  [facility] [address]        [facility]  INITIAL VISIT NOTE  September 9, 2019    PRIMARY CARE PROVIDER AND CLINIC:  [facility] [address]  / [facility] [address]    Chief Complaint   Patient presents with     Hospital F/U       HPI:    Shaun Patel is a 83 year old  (1935) male who [facility] [place] [facility] for an initial visit. Medical history is notable for CLL, Hodgkin's lymphoma, seizure, liposarcoma of left shoulder, anemia, lower extremity edema, astrocytoma of frontal lobe, liver masses, renal tumor, hemorrhoids, vitamin D deficiency, PJP and CMV pneumonia, and hyperlipidemia.      He was hospitalized at [facility] from July 30 through August 6, 2019 for left inguinal abscess and abscess.   He was a started on cefepime, vancomycin, and metronidazole.  He was seen by urology and underwent debridement on July 31, 2019 without complication.  Culture grew Enterococcus faecalis and E. coli.  Antibiotic was changed to Unasyn on August 4 and he was discharged on oral Augmentin for an additional 9 days.  He was subsequently admitted to TCU for rehab.  He was again readmitted to [facility] from August 28 2 September 4, 2019 for left inguinal wound exploration/debridement.  Wound culture grew E. coli, Proteus vulgaris, and Enterococcus faecalis.  Infectious disease was consulted and recommended broad-spectrum antibiotics.  He was therefore discharged on Augmentin and ciprofloxacin for 9 more days.  In the hospital the patient was noted to have hyperglycemia with elevated hemoglobin A1c of 9.3%, consistent with new diagnosis of diabetes mellitus.  He was therefore started on Lantus and sliding scale NovoLog.     Notably, he had been also hospitalized at [facility] from Katie 3 through June 12, 2019 for suspected pneumonia.   Bronchoscopy on  with PJP on PCR, however cytology was negative for pneumocystis.  His bronchoscopy did ultimately grow CMV.  He completed courses of primaquine, clindamycin, and valacyclovir.    Patient is admitted to this facility for medical management, nursing care, and rehab.     Patient was seen in his room, while lying in the bed.  The patient continues to feel generally weak and reports dyspnea with exertion.  On further questioning, he states he was able to walk up to 300 feet yesterday before getting short of breath.  He reports no cough, chills, fever, chest pain, palpitation, nausea, vomiting, or abdominal pain.  He continues to use compression stockings for his lower extremity edema.  Blood glucose levels are running high over 200.    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 carcinoma  Pancreatic cystic lesions, noted on CT abdomen/pelvis on 2019  CLL  Chronic anemia  Chronic thrombocytopenia  Liver masses  Focal seizure  GERD  Liposarcoma of left shoulder  Left inguinal abscess  Demand ischemia myocardium  Hemorrhoids  Hypercholesterolemia  Rosacea  Actinic keratosis  Shingles  Kidney stone  History of PJP and MCV pneumonia  Essential tremor    PAST SURGICAL HISTORY:   Hemorrhoidectomy  Cholecystectomy, laparoscopic  Mastectomy  [place] teeth extraction  Wide local excision of left shoulder lesion for liposarcoma  Left inguinal I&D for abscess    FAMILY HISTORY:   One brother with a history of liver transplant, another brother with a history of CLL.  Father  at the age of 55 following a machinery accident complicated by chest infection after operation.  Mother  at the age of 52 from brain bleed following a fall.    SOCIAL HISTORY:   Patient is .  He is a never smoker.  He drinks beer occasionally.  He does use a walker for ambulation.       MEDICATIONS:  Current  Outpatient Medications   Medication Sig Dispense Refill     acetaminophen (TYLENOL) 325 MG tablet Take 650 mg by mouth every 4 hours as needed for fever or pain       amoxicillin-clavulanate (AUGMENTIN) 875-125 MG tablet Take 1 tablet by mouth 2 times daily       ciprofloxacin (CIPRO) 500 MG tablet Take 500 mg by mouth 2 times daily       dexamethasone (DECADRON) 2 MG tablet Take 2 mg by mouth 2 times daily (with meals)        FUROSEMIDE PO Take 20 mg by mouth every morning        insulin aspart (NOVOLOG PEN) 100 UNIT/ML pen Give subcutaneous TID with meals per blood glucose (mg/dL). Don't give corrective dose for PRN, post-prandial or nocturnal glucose checks unless ordered.  Blood Glucose.....Dose   <70...............See Hypoglycemia Protocol   ..........No insulin, give prandial insulin, if ordered  150-199........1 unit  200-249........2 units  250-299........3 units  300-349........4 units  350-399........5 units  400 or greater....6 units & call MD       insulin aspart (NOVOLOG PEN) 100 UNIT/ML pen Inject 3 Units Subcutaneous 3 times daily (with meals) Subcutaneous injection: 3 Units three times daily with meals       insulin glargine (BASAGLAR KWIKPEN) 100 UNIT/ML pen Inject 12 Units Subcutaneous daily        metoprolol succinate ER (TOPROL-XL) 25 MG 24 hr tablet Take 12.5 mg by mouth daily Take 0.5 tablets by mouth once daily       nystatin (MYCOSTATIN) 582668 UNIT/GM external powder Apply topically 2 times daily        pantoprazole (PROTONIX) 20 MG EC tablet Take 40 mg by mouth daily       Skin Protectants, Misc. (EUCERIN) cream Apply topically as needed for dry skin Apply to bilateral feet and LE topically one time a day for dry skin and Apply to bilateral feet and LE topically as needed for dry skin         ALLERGIES:  Allergies   Allergen Reactions     Allopurinol      Simvastatin      Sulfamethoxazole-Trimethoprim        ROS:  10 point ROS neg other than the symptoms noted above in the  "HPI.      PHYSICAL EXAM:  /75   Pulse 118   Temp 97.6  F (36.4  C)   Resp 18   Ht 1.803 m (5' 11\")   Wt 94.9 kg (209 lb 3.2 oz)   SpO2 98%   BMI 29.18 kg/m      Gen: Cooperative and in no acute distress  HEENT: Normocephalic; oropharynx clear, mild conjunctival pallor  Card: Normal S1, S2, RRR, no murmurs  Resp: Lungs clear to auscultation bilaterally  GI: Abdomen soft, not-tender, non-distended, +BS  Ext: Patient is wearing compression stockings.  There is 1+ bilateral LE edema  Neuro: CX II-XII grossly in tact; ROM in all four extremities grossly in tact  Psych: Alert and oriented x3; normal affect  Skin: No acute rash    LABORATORY/IMAGING DATA:  Hematology profile on September 3, 2019: Hemoglobin 9.7, white count 6.1, platelet count 87,000  Chemistry profile on September 4, 2019: Glucose 189, creatinine 1.04, sodium 138, potassium 3.8    ASSESSMENT/PLAN:  Physical deconditioning.   Plan:  Continue PT/OT     Left groin abscess with necrotizing cellulitis, s/p debridement on  July 31 ,2019 and August 28, 2019  Left renal complex cystic lesion.  Wound culture has grown E. coli, Proteus vulgaris, and enterococcus faecalis.  Plan:  Continue oral ciprofloxacin and Augmentin through September 13, 2019  Continue wound care  Follow-up with wound clinic on September 16 and urology clinic on September 30, 2019    Dyspnea.  Mild.  Likely due to physical deconditioning.  Chest x-ray negative.  Plan:  Continue to monitor.  Consider CT chest if symptoms worsen    Diabetes mellitus type 2, uncontrolled.  New diagnosis.  Hemoglobin A1c 9.3%.  Blood glucose levels are currently over 200 mg/dL.  He is on a regimen including Basaglar 12 units subcu nightly and NovoLog 3 units subcu p.m. 3 times daily.  Plan:  Discontinue scheduled NovoLog  Change Basaglar to 10 units subcu twice daily, first dose now  Continue sliding scale NovoLog  Continue to monitor blood glucose  Diabetic education    Anemia, unspecified " type.  Acute on chronic due to recent rectal bleed.    Plan:  Continue to monitor hemoglobin periodically     Sinus tachycardia.  Started recently on beta-blocker for sinus tachycardia.   I suspect his tachycardia is reactive to hypermetabolic state due to underlying malignancy and recent infection.   Plan:  continue with metoprolol XL 12.5 mg p.o. daily.     Bilateral leg edema.  Likely related to possibl e venous insufficiency and recent IV fluid therapy.  Plan:  Continue with compression stockings and furosemide.       Pancreatic lesions.  Noted on CT scan incidentally on 07/30 showing 2 cystic lesions, question neoplasm/IPMN vs pseudocyst.  Plan:  Further evaluation as outpatient.     CLL (chronic lymphocytic leukemia),  History of nodular sclerosing Hodgkin's lymphoma, unspecified body region,  Liposarcoma left shoulder,  Pancreatic cystic lesions.  Plan:  Follow with primary oncologist as outpatient    History of frontal lobe brain astrocytoma.  Plan:  Continue Decadron.  Follow up with Dr. Parry per usual schedule.      GERD.  Chronic.   Plan:  Continue prior to admission famotidine.     Recent rectal bleed due to hemorrhoids.  Plan:  Continue to monitor for recurrence of bleeding      Electronically signed by:  Andriy Castellano MD                      Sincerely,        Andriy Castellano MD

## 2019-09-09 NOTE — PROGRESS NOTES
[facility]  INITIAL VISIT NOTE  September 9, 2019    PRIMARY CARE PROVIDER AND CLINIC:  [facility] [address]  / [facility] [address]    Chief Complaint   Patient presents with     Hospital F/U       HPI:    Shaun Patel is a 83 year old  (1935) male who was seen at [place] on [facility] on September 9, 2019 for an initial visit. Medical history is notable for CLL, Hodgkin's lymphoma, seizure, liposarcoma of left shoulder, anemia, lower extremity edema, astrocytoma of frontal lobe, liver masses, renal tumor, hemorrhoids, vitamin D deficiency, PJP and CMV pneumonia, and hyperlipidemia.      He was hospitalized at [facility] from July 30 through August 6, 2019 for left inguinal abscess and abscess.  He was a started on cefepime, vancomycin, and metronidazole.  He was seen by urology and underwent debridement on July 31, 2019 without complication.  Culture grew Enterococcus faecalis and E. coli.  Antibiotic was changed to Unasyn on August 4 and he was discharged on oral Augmentin for an additional 9 days.  He was subsequently admitted to TCU for rehab.  He was again readmitted to [facility] from August 28 2 September 4, 2019 for left inguinal wound exploration/debridement.  Wound culture grew E. coli, Proteus vulgaris, and Enterococcus faecalis.  Infectious disease was consulted and recommended broad-spectrum antibiotics.  He was therefore discharged on Augmentin and ciprofloxacin for 9 more days.  In the hospital the patient was noted to have hyperglycemia with elevated hemoglobin A1c of 9.3%, consistent with new diagnosis of diabetes mellitus.  He was therefore started on Lantus and sliding scale NovoLog.     Notably, he had been also hospitalized at [facility] from Katie 3 through June 12, 2019 for suspected pneumonia.  Bronchoscopy on June 7 with PJP on PCR, however cytology was negative for pneumocystis.  His  bronchoscopy did ultimately grow CMV.  He completed courses of primaquine, clindamycin, and valacyclovir.    Patient is admitted to this facility for medical management, nursing care, and rehab.     Patient was seen in his room, while lying in the bed.  The patient continues to feel generally weak and reports dyspnea with exertion.  On further questioning, he states he was able to walk up to 300 feet yesterday before getting short of breath.  He reports no cough, chills, fever, chest pain, palpitation, nausea, vomiting, or abdominal pain.  He continues to use compression stockings for his lower extremity edema.  Blood glucose levels are running high over 200.    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 carcinoma  Pancreatic cystic lesions, noted on CT abdomen/pelvis on 2019  CLL  Chronic anemia  Chronic thrombocytopenia  Liver masses  Focal seizure  GERD  Liposarcoma of left shoulder  Left inguinal abscess  Demand ischemia myocardium  Hemorrhoids  Hypercholesterolemia  Rosacea  Actinic keratosis  Shingles  Kidney stone  History of PJP and MCV pneumonia  Essential tremor    PAST SURGICAL HISTORY:   Hemorrhoidectomy  Cholecystectomy, laparoscopic  Mastectomy  [place] teeth extraction  Wide local excision of left shoulder lesion for liposarcoma  Left inguinal I&D for abscess    FAMILY HISTORY:   One brother with a history of liver transplant, another brother with a history of CLL.  Father  at the age of 55 following a machinery accident complicated by chest infection after operation.  Mother  at the age of 52 from brain bleed following a fall.    SOCIAL HISTORY:   Patient is .  He is a never smoker.  He drinks beer occasionally.  He does use a walker for ambulation.       MEDICATIONS:  Current Outpatient Medications   Medication Sig Dispense Refill     acetaminophen (TYLENOL) 325 MG  tablet Take 650 mg by mouth every 4 hours as needed for fever or pain       amoxicillin-clavulanate (AUGMENTIN) 875-125 MG tablet Take 1 tablet by mouth 2 times daily       ciprofloxacin (CIPRO) 500 MG tablet Take 500 mg by mouth 2 times daily       dexamethasone (DECADRON) 2 MG tablet Take 2 mg by mouth 2 times daily (with meals)        FUROSEMIDE PO Take 20 mg by mouth every morning        insulin aspart (NOVOLOG PEN) 100 UNIT/ML pen Give subcutaneous TID with meals per blood glucose (mg/dL). Don't give corrective dose for PRN, post-prandial or nocturnal glucose checks unless ordered.  Blood Glucose.....Dose   <70...............See Hypoglycemia Protocol   ..........No insulin, give prandial insulin, if ordered  150-199........1 unit  200-249........2 units  250-299........3 units  300-349........4 units  350-399........5 units  400 or greater....6 units & call MD       insulin aspart (NOVOLOG PEN) 100 UNIT/ML pen Inject 3 Units Subcutaneous 3 times daily (with meals) Subcutaneous injection: 3 Units three times daily with meals       insulin glargine (BASAGLAR KWIKPEN) 100 UNIT/ML pen Inject 12 Units Subcutaneous daily        metoprolol succinate ER (TOPROL-XL) 25 MG 24 hr tablet Take 12.5 mg by mouth daily Take 0.5 tablets by mouth once daily       nystatin (MYCOSTATIN) 870554 UNIT/GM external powder Apply topically 2 times daily        pantoprazole (PROTONIX) 20 MG EC tablet Take 40 mg by mouth daily       Skin Protectants, Misc. (EUCERIN) cream Apply topically as needed for dry skin Apply to bilateral feet and LE topically one time a day for dry skin and Apply to bilateral feet and LE topically as needed for dry skin         ALLERGIES:  Allergies   Allergen Reactions     Allopurinol      Simvastatin      Sulfamethoxazole-Trimethoprim        ROS:  10 point ROS neg other than the symptoms noted above in the HPI.      PHYSICAL EXAM:  /75   Pulse 118   Temp 97.6  F (36.4  C)   Resp 18   Ht 1.803 m  "(5' 11\")   Wt 94.9 kg (209 lb 3.2 oz)   SpO2 98%   BMI 29.18 kg/m     Gen: Cooperative and in no acute distress  HEENT: Normocephalic; oropharynx clear, mild conjunctival pallor  Card: Normal S1, S2, RRR, no murmurs  Resp: Lungs clear to auscultation bilaterally  GI: Abdomen soft, not-tender, non-distended, +BS  Ext: Patient is wearing compression stockings.  There is 1+ bilateral LE edema  Neuro: CX II-XII grossly in tact; ROM in all four extremities grossly in tact  Psych: Alert and oriented x3; normal affect  Skin: No acute rash    LABORATORY/IMAGING DATA:  Hematology profile on September 3, 2019: Hemoglobin 9.7, white count 6.1, platelet count 87,000  Chemistry profile on September 4, 2019: Glucose 189, creatinine 1.04, sodium 138, potassium 3.8    ASSESSMENT/PLAN:  Physical deconditioning.   Plan:  Continue PT/OT     Left groin abscess with necrotizing cellulitis, s/p debridement on July 31 ,2019 and August 28, 2019  Left renal complex cystic lesion.  Wound culture has grown E. coli, Proteus vulgaris, and enterococcus faecalis.  Plan:  Continue oral ciprofloxacin and Augmentin through September 13, 2019  Continue wound care  Follow-up with wound clinic on September 16 and urology clinic on September 30, 2019    Dyspnea.  Mild.  Likely due to physical deconditioning.  Chest x-ray negative.  Plan:  Continue to monitor.  Consider CT chest if symptoms worsen    Diabetes mellitus type 2, uncontrolled.  New diagnosis.  Hemoglobin A1c 9.3%.  Blood glucose levels are currently over 200 mg/dL.  He is on a regimen including Basaglar 12 units subcu nightly and NovoLog 3 units subcu p.m. 3 times daily.  Plan:  Discontinue scheduled NovoLog  Change Basaglar to 10 units subcu twice daily, first dose now  Continue sliding scale NovoLog  Continue to monitor blood glucose  Diabetic education    Anemia, unspecified type.  Acute on chronic due to recent rectal bleed.    Plan:  Continue to monitor hemoglobin " periodically     Sinus tachycardia.  Started recently on beta-blocker for sinus tachycardia.   I suspect his tachycardia is reactive to hypermetabolic state due to underlying malignancy and recent infection.   Plan:  continue with metoprolol XL 12.5 mg p.o. daily.     Bilateral leg edema.  Likely related to possible venous insufficiency and recent IV fluid therapy.  Plan:  Continue with compression stockings and furosemide.       Pancreatic lesions.  Noted on CT scan incidentally on 07/30 showing 2 cystic lesions, question neoplasm/IPMN vs pseudocyst.  Plan:  Further evaluation as outpatient.     CLL (chronic lymphocytic leukemia),  History of nodular sclerosing Hodgkin's lymphoma, unspecified body region,  Liposarcoma left shoulder,  Pancreatic cystic lesions.  Plan:  Follow with primary oncologist as outpatient    History of frontal lobe brain astrocytoma.  Plan:  Continue Decadron.  Follow up with Dr. Parry per usual schedule.      GERD.  Chronic.   Plan:  Continue prior to admission famotidine.     Recent rectal bleed due to hemorrhoids.  Plan:  Continue to monitor for recurrence of bleeding      Electronically signed by:  Andriy Castellano MD

## 2019-09-11 ENCOUNTER — NURSING HOME VISIT (OUTPATIENT)
Dept: GERIATRICS | Facility: CLINIC | Age: 84
End: 2019-09-11
Payer: COMMERCIAL

## 2019-09-11 VITALS
TEMPERATURE: 97.8 F | HEIGHT: 71 IN | OXYGEN SATURATION: 95 % | SYSTOLIC BLOOD PRESSURE: 107 MMHG | BODY MASS INDEX: 29.4 KG/M2 | WEIGHT: 210 LBS | RESPIRATION RATE: 14 BRPM | HEART RATE: 95 BPM | DIASTOLIC BLOOD PRESSURE: 71 MMHG

## 2019-09-11 DIAGNOSIS — D64.9 ANEMIA, UNSPECIFIED TYPE: ICD-10-CM

## 2019-09-11 DIAGNOSIS — C81.10 NODULAR SCLEROSING HODGKIN'S LYMPHOMA, UNSPECIFIED BODY REGION (H): ICD-10-CM

## 2019-09-11 DIAGNOSIS — C71.9 ASTROCYTOMA (H): ICD-10-CM

## 2019-09-11 DIAGNOSIS — R53.81 PHYSICAL DECONDITIONING: ICD-10-CM

## 2019-09-11 DIAGNOSIS — C91.10 CLL (CHRONIC LYMPHOCYTIC LEUKEMIA) (H): ICD-10-CM

## 2019-09-11 DIAGNOSIS — E11.8 TYPE 2 DIABETES MELLITUS WITH COMPLICATION, UNSPECIFIED WHETHER LONG TERM INSULIN USE: ICD-10-CM

## 2019-09-11 DIAGNOSIS — L03.818 CELLULITIS OF OTHER SPECIFIED SITE: ICD-10-CM

## 2019-09-11 DIAGNOSIS — R60.0 BILATERAL LEG EDEMA: ICD-10-CM

## 2019-09-11 DIAGNOSIS — R00.0 SINUS TACHYCARDIA: ICD-10-CM

## 2019-09-11 DIAGNOSIS — L02.214 ABSCESS OF GROIN, LEFT: Primary | ICD-10-CM

## 2019-09-11 PROCEDURE — 99309 SBSQ NF CARE MODERATE MDM 30: CPT | Performed by: NURSE PRACTITIONER

## 2019-09-11 ASSESSMENT — MIFFLIN-ST. JEOR: SCORE: 1669.68

## 2019-09-11 NOTE — LETTER
9/11/2019        RE: Shaun Patel  [address]  [facility] [address]        [place] [facility]  [place] Medical Record Number:  [id number]  [facility] of [facility] where encounter took place[facility] [[id number]]  Chief Complaint   Patient presents with     RECHECK       HPI:    Shaun Patel  is a 83 year old (1935), who is being seen today for an episodic care visit.  HPI information obtained from: facility chart records, facility staff, patient report, [facility] chart review and Care Everywhere [facility] chart review  He initially came to this facility 8/6/2019 following hospitalization at [facility] with left groin abscess, s/p I&D 7/31/2019. He was treated with IV antibiotics and discharged on Augmentin. He had poor wound healing and was rehospitalized 8/28/2019 for I&D  by Dr Curtis. Cultures grew E coli, Proteus and Enterococcus. ID consulted and he was treated with broad spectrum antibiotics. Discharged on Cipro and Augmentin through 9/13/2019.  He was noted to have rising blood sugars and HgbA1c was 9.3. Basaglar  and Novolog were started for new diagnosis of diabetes type 2. Decadron dose was decreased.  He had rectal bleeding and Hgb dropped to the 9s. He declined GI work up, as he has done during previous hospitalizations. [facility] nurse consulted and recommended bid wound care with Vashe moistened gauze, covered with ABD. He returned to the facility 9/4/2019 for ongoing rehab and medical management.     Today's concern is:     Abscess of groin, left-afebrile. Feeling fairly well. Reports he feels strong early in the day, but becomes more tired and short of breath as the day progresses. No cough or chest pain. He's had shortness of breath since hospitalization for pneumonia in 6/2019. CT at that time was negative for PE. CXR 9/6/2019 showed chronic lung changes, no infiltrate, effusion or pulmonary edema.   Cellulitis of other specified site  Type 2 diabetes mellitus with  complication, unspecified whether long term insulin use (H)-basaglar was increased and changed to bid 9/9/2019. Blood sugar 170 this am. Yesterday readings were 318-416. Denies symptoms of hyperglycemia.   CLL (chronic lymphocytic leukemia) (H)  Nodular sclerosing Hodgkin's lymphoma, unspecified body region (H)  Astrocytoma (H)  Sinus tachycardia-HR: 82-94   BPs: 107/71, 127/75, 119/80  Anemia, unspecified type  Bilateral leg edema-denies calf pain.   Physical deconditioning-ambulating 350 ft with walker and stand by assist. Requires min to mod assist with cares.   SLUMS 22/30, CPT 5.2/5.6  Past Medical and Surgical History reviewed in Epic today.    MEDICATIONS:  Current Outpatient Medications   Medication Sig Dispense Refill     acetaminophen (TYLENOL) 325 MG tablet Take 650 mg by mouth every 4 hours as needed for fever or pain       amoxicillin-clavulanate (AUGMENTIN) 875-125 MG tablet Take 1 tablet by mouth 2 times daily       ciprofloxacin (CIPRO) 500 MG tablet Take 500 mg by mouth 2 times daily       dexamethasone (DECADRON) 2 MG tablet Take 2 mg by mouth 2 times daily (with meals)        FUROSEMIDE PO Take 20 mg by mouth every morning        insulin aspart (NOVOLOG PEN) 100 UNIT/ML pen Give subcutaneous TID with meals per blood glucose (mg/dL). Don't give corrective dose for PRN, post-prandial or nocturnal glucose checks unless ordered.  Blood Glucose.....Dose   <70...............See Hypoglycemia Protocol   ..........No insulin, give prandial insulin, if ordered  150-199........1 unit  200-249........2 units  250-299........3 units  300-349........4 units  350-399........5 units  400 or greater....6 units & call MD       insulin glargine (BASAGLAR KWIKPEN) 100 UNIT/ML pen Inject 12 Units Subcutaneous 2 times daily        metoprolol succinate ER (TOPROL-XL) 25 MG 24 hr tablet Take 12.5 mg by mouth daily Take 0.5 tablets by mouth once daily       nystatin (MYCOSTATIN) 734650 UNIT/GM external powder Apply  "topically 2 times daily        pantoprazole (PROTONIX) 20 MG EC tablet Take 40 mg by mouth daily       Skin Protectants, Misc. (EUCERIN) cream Apply topically as needed for dry skin Apply to bilateral feet and LE topically one time a day for dry skin and Apply to bilateral feet and LE topically as needed for dry skin         REVIEW OF SYSTEMS:  4 point ROS including Respiratory, CV, GI and , other than that noted in the HPI,  is negative    Objective:  /71   Pulse 95   Temp 97.8  F (36.6  C)   Resp 14   Ht 1.803 m (5' 11\")   Wt 95.3 kg (210 lb)   SpO2 95%   BMI 29.29 kg/m     Exam:  GENERAL APPEARANCE:  Alert, in no distress  ENT:  Mouth and posterior oropharynx normal, moist mucous membranes, [place]  EYES:  EOM normal, conjunctiva and lids normal, PERRL  NECK:  No adenopathy,masses or thyromegaly  RESP:  respiratory effort and palpation of chest normal, lungs clear to auscultation , no respiratory distress  CV:  Palpation and auscultation of heart done , regular rate and rhythm, no  murmur, +2 pedal pulses, peripheral edema  trace  in both LE  ABDOMEN: soft, nontender, no hepatosplenomegaly or other masses  M/S:   gait steady with walker.  [place]AD with good strength. No joint inflammation  SKIN:  left groin wound with granulation and scant slough. Mild surrounding  induration and mild erythema. Mild erythema of omid area. Fading bruising across lower abdomen   PSYCH:  oriented X 3, memory impaired , affect and mood normal    Labs:   Lab Results   Component Value Date    WBC 13.3 09/09/2019     Lab Results   Component Value Date    RBC 3.59 09/09/2019     Lab Results   Component Value Date    HGB 11.8 09/09/2019     Lab Results   Component Value Date    HCT 35.5 09/09/2019     Lab Results   Component Value Date    MCV 99 09/09/2019     Lab Results   Component Value Date    MCH 32.9 09/09/2019     Lab Results   Component Value Date    MCHC 33.2 09/09/2019     Lab Results   Component Value Date    RDW 16.0 " 09/09/2019     Lab Results   Component Value Date     09/09/2019     Last Comprehensive Metabolic Panel:  Sodium   Date Value Ref Range Status   09/09/2019 141 133 - 144 mmol/L Final     Potassium   Date Value Ref Range Status   09/09/2019 3.4 3.4 - 5.3 mmol/L Final     Chloride   Date Value Ref Range Status   09/09/2019 104 94 - 109 mmol/L Final     Carbon Dioxide   Date Value Ref Range Status   09/09/2019 28 20 - 32 mmol/L Final     Anion Gap   Date Value Ref Range Status   09/09/2019 9 3 - 14 mmol/L Final     Glucose   Date Value Ref Range Status   09/09/2019 191 (H) 70 - 99 mg/dL Final     Urea Nitrogen   Date Value Ref Range Status   09/09/2019 34 (H) 7 - 30 mg/dL Final     Creatinine   Date Value Ref Range Status   09/09/2019 1.27 (H) 0.66 - 1.25 mg/dL Final     GFR Estimate   Date Value Ref Range Status   09/09/2019 52 (L) >60 mL/min/[1.73_m2] Final     Comment:     Non  GFR Calc  Starting 12/18/2018, serum creatinine based estimated GFR (eGFR) will be   calculated using the Chronic Kidney Disease Epidemiology Collaboration   (CKD-EPI) equation.       Calcium   Date Value Ref Range Status   09/09/2019 8.3 (L) 8.5 - 10.1 mg/dL Final       ASSESSMENT / PLAN:  (L02.214) Abscess of groin, left  (primary encounter diagnosis)  (L03.818) Cellulitis of other specified site  Comment: wound improve.    Ongoing mild shortness of breath with exertion,  probably due to fatigue and general deconditioning. CXR negative and no  s/s of PE.   Plan: continue Augmentin and cipro with  stop date of 9/13/2019. Wound care bid. Follow up Wound Clinic 9/16 and Urology 9/30/2019.   Closely monitor respiratory status and obtain CT if worse.      (E11.8,  Z79.4) Type 2 diabetes mellitus with complication, with long-term current use of insulin (H)  Comment: new diagnosis. Basaglar  increased 9/9/2019.   Plan:  increase Basaglar to 12 units bid and continue Novolog sliding scale. Monitor blood sugars. Ongoing  patient education re: diet, checking blood sugars, self admin of insulin.  Dietician is involved.      (C91.90) CLL (chronic lymphocytic leukemia) (H)  (C81.10) Nodular sclerosing Hodgkin's lymphoma, unspecified body region (H)  (C71.9) Astrocytoma (H)  Comment: cancers not currently active, per recent note from Oncology. He has known liver masses, renal tumor and pancreatic cystic lesions.   Plan: continue decadron. Oncology follow up when he feels ready to attend outpatient appts.      (R00.0) Sinus tachycardia  Comment: rate controlled   Plan: continue metoprolol. Monitor VS      (D64.9) Anemia, unspecified type  Comment: acute on chronic. No signs of active bleeding.    Plan:  follow Hgb      (R60.0) Bilateral leg edema  Comment: improved   Plan: continue compression stockings and elevate legs      (R53.81) Physical deconditioning  Comment:  progressing  in therapies.   Plan: continue PHYSICAL THERAPY/OT. Goal is to return home with his wife and home services-although he is considering AL.     Electronically signed by:  CRISELDA Cohen CNP               Sincerely,        CRISELDA Cohen CNP

## 2019-09-11 NOTE — PROGRESS NOTES
[place] [facility]  [place] [facility] Record Number:  [id number]  Place of Service where encounter took place:  [facility] [[id number]]  Chief Complaint   Patient presents with     RECHECK       HPI:    Shaun Patel  is a 83 year old (1935), who is being seen today for an episodic care visit.  HPI information obtained from: facility chart records, facility staff, patient report, [facility] chart review and Care Everywhere [facility] chart review  He initially came to this facility 8/6/2019 following hospitalization at [facility] with left groin abscess, s/p I&D 7/31/2019. He was treated with IV antibiotics and discharged on Augmentin. He had poor wound healing and was rehospitalized 8/28/2019 for I&D  by Dr Curtis. Cultures grew E coli, Proteus and Enterococcus. ID consulted and he was treated with broad spectrum antibiotics. Discharged on Cipro and Augmentin through 9/13/2019.  He was noted to have rising blood sugars and HgbA1c was 9.3. Basaglar  and Novolog were started for new diagnosis of diabetes type 2. Decadron dose was decreased.  He had rectal bleeding and Hgb dropped to the 9s. He declined GI work up, as he has done during previous hospitalizations. [facility] nurse consulted and recommended bid wound care with Vashe moistened gauze, covered with ABD. He returned to the facility 9/4/2019 for ongoing rehab and medical management.     Today's concern is:     Abscess of groin, left-afebrile. Feeling fairly well. Reports he feels strong early in the day, but becomes more tired and short of breath as the day progresses. No cough or chest pain. He's had shortness of breath since hospitalization for pneumonia in 6/2019. CT at that time was negative for PE. CXR 9/6/2019 showed chronic lung changes, no infiltrate, effusion or pulmonary edema.   Cellulitis of other specified site  Type 2 diabetes mellitus with complication, unspecified whether long term insulin use (H)-basaglar was increased and  changed to bid 9/9/2019. Blood sugar 170 this am. Yesterday readings were 318-416. Denies symptoms of hyperglycemia.   CLL (chronic lymphocytic leukemia) (H)  Nodular sclerosing Hodgkin's lymphoma, unspecified body region (H)  Astrocytoma (H)  Sinus tachycardia-HR: 82-94   BPs: 107/71, 127/75, 119/80  Anemia, unspecified type  Bilateral leg edema-denies calf pain.   Physical deconditioning-ambulating 350 ft with walker and stand by assist. Requires min to mod assist with cares.   SLUMS 22/30, CPT 5.2/5.6  Past Medical and Surgical History reviewed in Epic today.    MEDICATIONS:  Current Outpatient Medications   Medication Sig Dispense Refill     acetaminophen (TYLENOL) 325 MG tablet Take 650 mg by mouth every 4 hours as needed for fever or pain       amoxicillin-clavulanate (AUGMENTIN) 875-125 MG tablet Take 1 tablet by mouth 2 times daily       ciprofloxacin (CIPRO) 500 MG tablet Take 500 mg by mouth 2 times daily       dexamethasone (DECADRON) 2 MG tablet Take 2 mg by mouth 2 times daily (with meals)        FUROSEMIDE PO Take 20 mg by mouth every morning        insulin aspart (NOVOLOG PEN) 100 UNIT/ML pen Give subcutaneous TID with meals per blood glucose (mg/dL). Don't give corrective dose for PRN, post-prandial or nocturnal glucose checks unless ordered.  Blood Glucose.....Dose   <70...............See Hypoglycemia Protocol   ..........No insulin, give prandial insulin, if ordered  150-199........1 unit  200-249........2 units  250-299........3 units  300-349........4 units  350-399........5 units  400 or greater....6 units & call MD       insulin glargine (BASAGLAR KWIKPEN) 100 UNIT/ML pen Inject 12 Units Subcutaneous 2 times daily        metoprolol succinate ER (TOPROL-XL) 25 MG 24 hr tablet Take 12.5 mg by mouth daily Take 0.5 tablets by mouth once daily       nystatin (MYCOSTATIN) 152408 UNIT/GM external powder Apply topically 2 times daily        pantoprazole (PROTONIX) 20 MG EC tablet Take 40 mg by  "mouth daily       Skin Protectants, Misc. (EUCERIN) cream Apply topically as needed for dry skin Apply to bilateral feet and LE topically one time a day for dry skin and Apply to bilateral feet and LE topically as needed for dry skin         REVIEW OF SYSTEMS:  4 point ROS including Respiratory, CV, GI and , other than that noted in the HPI,  is negative    Objective:  /71   Pulse 95   Temp 97.8  F (36.6  C)   Resp 14   Ht 1.803 m (5' 11\")   Wt 95.3 kg (210 lb)   SpO2 95%   BMI 29.29 kg/m    Exam:  GENERAL APPEARANCE:  Alert, in no distress  ENT:  Mouth and posterior oropharynx normal, moist mucous membranes, [place]  EYES:  EOM normal, conjunctiva and lids normal, PERRL  NECK:  No adenopathy,masses or thyromegaly  RESP:  respiratory effort and palpation of chest normal, lungs clear to auscultation , no respiratory distress  CV:  Palpation and auscultation of heart done , regular rate and rhythm, no  murmur, +2 pedal pulses, peripheral edema  trace  in both LE  ABDOMEN: soft, nontender, no hepatosplenomegaly or other masses  M/S:   gait steady with walker.  [place] with good strength. No joint inflammation  SKIN:  left groin wound with granulation and scant slough. Mild surrounding  induration and mild erythema. Mild erythema of omid area. Fading bruising across lower abdomen   PSYCH:  oriented X 3, memory impaired , affect and mood normal    Labs:   Lab Results   Component Value Date    WBC 13.3 09/09/2019     Lab Results   Component Value Date    RBC 3.59 09/09/2019     Lab Results   Component Value Date    HGB 11.8 09/09/2019     Lab Results   Component Value Date    HCT 35.5 09/09/2019     Lab Results   Component Value Date    MCV 99 09/09/2019     Lab Results   Component Value Date    MCH 32.9 09/09/2019     Lab Results   Component Value Date    MCHC 33.2 09/09/2019     Lab Results   Component Value Date    RDW 16.0 09/09/2019     Lab Results   Component Value Date     09/09/2019     Last " Comprehensive Metabolic Panel:  Sodium   Date Value Ref Range Status   09/09/2019 141 133 - 144 mmol/L Final     Potassium   Date Value Ref Range Status   09/09/2019 3.4 3.4 - 5.3 mmol/L Final     Chloride   Date Value Ref Range Status   09/09/2019 104 94 - 109 mmol/L Final     Carbon Dioxide   Date Value Ref Range Status   09/09/2019 28 20 - 32 mmol/L Final     Anion Gap   Date Value Ref Range Status   09/09/2019 9 3 - 14 mmol/L Final     Glucose   Date Value Ref Range Status   09/09/2019 191 (H) 70 - 99 mg/dL Final     Urea Nitrogen   Date Value Ref Range Status   09/09/2019 34 (H) 7 - 30 mg/dL Final     Creatinine   Date Value Ref Range Status   09/09/2019 1.27 (H) 0.66 - 1.25 mg/dL Final     GFR Estimate   Date Value Ref Range Status   09/09/2019 52 (L) >60 mL/min/[1.73_m2] Final     Comment:     Non  GFR Calc  Starting 12/18/2018, serum creatinine based estimated GFR (eGFR) will be   calculated using the Chronic Kidney Disease Epidemiology Collaboration   (CKD-EPI) equation.       Calcium   Date Value Ref Range Status   09/09/2019 8.3 (L) 8.5 - 10.1 mg/dL Final       ASSESSMENT / PLAN:  (L02.214) Abscess of groin, left  (primary encounter diagnosis)  (L03.818) Cellulitis of other specified site  Comment: wound improve.   Ongoing mild shortness of breath with exertion,  probably due to fatigue and general deconditioning. CXR negative and no  s/s of PE.   Plan: continue Augmentin and cipro with  stop date of 9/13/2019. Wound care bid. Follow up Wound Clinic 9/16 and Urology 9/30/2019.   Closely monitor respiratory status and obtain CT if worse.      (E11.8,  Z79.4) Type 2 diabetes mellitus with complication, with long-term current use of insulin (H)  Comment: new diagnosis. Basaglar  increased 9/9/2019.   Plan: increase Basaglar to 12 units bid and continue Novolog sliding scale. Monitor blood sugars. Ongoing patient education re: diet, checking blood sugars, self admin of insulin. Dietician is  involved.      (C91.90) CLL (chronic lymphocytic leukemia) (H)  (C81.10) Nodular sclerosing Hodgkin's lymphoma, unspecified body region (H)  (C71.9) Astrocytoma (H)  Comment: cancers not currently active, per recent note from Oncology. He has known liver masses, renal tumor and pancreatic cystic lesions.   Plan: continue decadron. Oncology follow up when he feels ready to attend outpatient appts.      (R00.0) Sinus tachycardia  Comment: rate controlled   Plan: continue metoprolol. Monitor VS      (D64.9) Anemia, unspecified type  Comment: acute on chronic. No signs of active bleeding.    Plan: follow Hgb      (R60.0) Bilateral leg edema  Comment: improved   Plan: continue compression stockings and elevate legs      (R53.81) Physical deconditioning  Comment: progressing  in therapies.   Plan: continue PHYSICAL THERAPY/OT. Goal is to return home with his wife and home services-although he is considering AL.     Electronically signed by:  CRISELDA Cohen CNP

## 2019-09-16 ENCOUNTER — HOSPITAL LABORATORY (OUTPATIENT)
Dept: OTHER | Facility: CLINIC | Age: 84
End: 2019-09-16

## 2019-09-16 ENCOUNTER — NURSING HOME VISIT (OUTPATIENT)
Dept: GERIATRICS | Facility: CLINIC | Age: 84
End: 2019-09-16
Payer: COMMERCIAL

## 2019-09-16 VITALS
BODY MASS INDEX: 29.4 KG/M2 | HEART RATE: 104 BPM | RESPIRATION RATE: 18 BRPM | WEIGHT: 210 LBS | DIASTOLIC BLOOD PRESSURE: 78 MMHG | TEMPERATURE: 96.1 F | OXYGEN SATURATION: 97 % | HEIGHT: 71 IN | SYSTOLIC BLOOD PRESSURE: 112 MMHG

## 2019-09-16 DIAGNOSIS — L02.214 ABSCESS OF GROIN, LEFT: Primary | ICD-10-CM

## 2019-09-16 DIAGNOSIS — R60.0 BILATERAL LEG EDEMA: ICD-10-CM

## 2019-09-16 DIAGNOSIS — R53.81 PHYSICAL DECONDITIONING: ICD-10-CM

## 2019-09-16 DIAGNOSIS — R00.0 SINUS TACHYCARDIA: ICD-10-CM

## 2019-09-16 DIAGNOSIS — D64.9 ANEMIA, UNSPECIFIED TYPE: ICD-10-CM

## 2019-09-16 DIAGNOSIS — E11.8 TYPE 2 DIABETES MELLITUS WITH COMPLICATION, UNSPECIFIED WHETHER LONG TERM INSULIN USE: ICD-10-CM

## 2019-09-16 DIAGNOSIS — C81.10 NODULAR SCLEROSING HODGKIN'S LYMPHOMA, UNSPECIFIED BODY REGION (H): ICD-10-CM

## 2019-09-16 DIAGNOSIS — C91.10 CLL (CHRONIC LYMPHOCYTIC LEUKEMIA) (H): ICD-10-CM

## 2019-09-16 DIAGNOSIS — L03.818 CELLULITIS OF OTHER SPECIFIED SITE: ICD-10-CM

## 2019-09-16 DIAGNOSIS — C71.9 ASTROCYTOMA (H): ICD-10-CM

## 2019-09-16 LAB
ANION GAP SERPL CALCULATED.3IONS-SCNC: 5 MMOL/L (ref 3–14)
BUN SERPL-MCNC: 34 MG/DL (ref 7–30)
CALCIUM SERPL-MCNC: 8.1 MG/DL (ref 8.5–10.1)
CHLORIDE SERPL-SCNC: 110 MMOL/L (ref 94–109)
CO2 SERPL-SCNC: 30 MMOL/L (ref 20–32)
CREAT SERPL-MCNC: 1.1 MG/DL (ref 0.66–1.25)
ERYTHROCYTE [DISTWIDTH] IN BLOOD BY AUTOMATED COUNT: 15.8 % (ref 10–15)
GFR SERPL CREATININE-BSD FRML MDRD: 61 ML/MIN/{1.73_M2}
GLUCOSE SERPL-MCNC: 77 MG/DL (ref 70–99)
HCT VFR BLD AUTO: 30.4 % (ref 40–53)
HGB BLD-MCNC: 9.9 G/DL (ref 13.3–17.7)
MCH RBC QN AUTO: 32.5 PG (ref 26.5–33)
MCHC RBC AUTO-ENTMCNC: 32.6 G/DL (ref 31.5–36.5)
MCV RBC AUTO: 100 FL (ref 78–100)
PLATELET # BLD AUTO: 114 10E9/L (ref 150–450)
POTASSIUM SERPL-SCNC: 3.3 MMOL/L (ref 3.4–5.3)
RBC # BLD AUTO: 3.05 10E12/L (ref 4.4–5.9)
SODIUM SERPL-SCNC: 145 MMOL/L (ref 133–144)
WBC # BLD AUTO: 8.2 10E9/L (ref 4–11)

## 2019-09-16 PROCEDURE — 99309 SBSQ NF CARE MODERATE MDM 30: CPT | Performed by: NURSE PRACTITIONER

## 2019-09-16 RX ORDER — INSULIN GLARGINE 100 [IU]/ML
32 INJECTION, SOLUTION SUBCUTANEOUS EVERY MORNING
COMMUNITY

## 2019-09-16 ASSESSMENT — MIFFLIN-ST. JEOR: SCORE: 1669.68

## 2019-09-16 NOTE — LETTER
9/16/2019        RE: Shaun Patel  [address]  [facility] [address]        [facility]  [place] [facility] Record Number:  [id number]  [facility] of Service where encounter took place:  [facility] [[id number]]  Chief Complaint   Patient presents with     RECHECK       HPI:    Shaun Patel  is a 83 year old (1935), who is being seen today for an episodic care visit.  HPI information obtained from: facility chart records, facility staff, patient report and [facility] chart review.  He initially came to this facility 8/6/2019 following hospitalization at [facility] with left groin abscess, s/p I&D 7/31/2019. He was treated with IV antibiotics and discharged on Augmentin. He had poor wound healing and was rehospitalized 8/28/2019 for I&D  by Dr Curtis. Cultures grew E coli, Proteus and Enterococcus. ID consulted and he was treated with broad spectrum antibiotics. Discharged on Cipro and Augmentin through 9/13/2019.  He was noted to have rising blood sugars and HgbA1c was 9.3. Basaglar  and Novolog were started for new diagnosis of diabetes type 2. Decadron dose was decreased.  He had rectal bleeding and Hgb dropped to the 9s. He declined GI work up, as he has done during previous hospitalizations. [facility] nurse consulted and recommended bid wound care with Vashe moistened gauze, covered with ABD. He returned to the facility 9/4/2019 for ongoing rehab and medical management.        Today's concerns are:      Abscess of groin, left  Cellulitis of other specified site-finished antibiotics  9/13/2019. Afebrile. Reports minimal pain. Feeling well. Continues to have shortness of breath with exertion later in the day when he's fatigued. No cough or chest pain.   Type 2 diabetes mellitus with complication, unspecified whether long term insulin use (H)-Basaglar was last increased 9/13/2019. Blood sugars in am: . Blood sugars later in the day remain high: 280-390. Denies symptoms of  hyperglycemia.   CLL (chronic lymphocytic leukemia) (H)  Nodular sclerosing Hodgkin's lymphoma, unspecified body region (H)  Astrocytoma (H)  Sinus tachycardia-HR:    BPs: 93/59, 111/76, 101/65, 136/76  Anemia, unspecified type  Bilateral leg edema-weight stable at 210 lbs.   Physical deconditioning-ambulating >350 ft with walker and stand by assist. Requires min to mod assist with cares   SLUMS 22/30, CPT 5.2/5.6       Past Medical and Surgical History reviewed in Epic today.    MEDICATIONS:  Current Outpatient Medications   Medication Sig Dispense Refill     acetaminophen (TYLENOL) 325 MG tablet Take 650 mg by mouth every 4 hours as needed for fever or pain       dexamethasone (DECADRON) 2 MG tablet Take 2 mg by mouth 2 times daily (with meals)        FUROSEMIDE PO Take 20 mg by mouth every morning        insulin aspart (NOVOLOG PEN) 100 UNIT/ML pen Give subcutaneous TID with meals per blood glucose (mg/dL). Don't give corrective dose for PRN, post-prandial or nocturnal glucose checks unless ordered.  Blood Glucose.....Dose   <70...............See Hypoglycemia Protocol   ..........No insulin, give prandial insulin, if ordered  150-199........1 unit  200-249........2 units  250-299........3 units  300-349........4 units  350-399........5 units  400 or greater....6 units & call MD       insulin glargine (BASAGLAR KWIKPEN) 100 UNIT/ML pen Inject 17 Units Subcutaneous every morning       insulin glargine (BASAGLAR KWIKPEN) 100 UNIT/ML pen Inject 14 Units Subcutaneous At Bedtime        metoprolol succinate ER (TOPROL-XL) 25 MG 24 hr tablet Take 12.5 mg by mouth daily Take 0.5 tablets by mouth once daily       nystatin (MYCOSTATIN) 539375 UNIT/GM external powder Apply topically 2 times daily        pantoprazole (PROTONIX) 20 MG EC tablet Take 40 mg by mouth daily       Skin Protectants, Misc. (EUCERIN) cream Apply topically as needed for dry skin Apply to bilateral feet and LE topically one time a day for  "dry skin and Apply to bilateral feet and LE topically as needed for dry skin         REVIEW OF SYSTEMS:  4 point ROS including Respiratory, CV, GI and , other than that noted in the HPI,  is negative    Objective:  /78   Pulse 104   Temp 96.1  F (35.6  C)   Resp 18   Ht 1.803 m (5' 11\")   Wt 95.3 kg (210 lb)   SpO2 97%   BMI 29.29 kg/m     Exam:  GENERAL APPEARANCE:  Alert, in no distress  ENT:  Mouth and posterior oropharynx normal, moist mucous membranes, [place]  EYES:  EOM normal, conjunctiva and lids normal  NECK:  No adenopathy,masses or thyromegaly  RESP:  respiratory effort and palpation of chest normal, lungs clear to auscultation , no respiratory distress  CV:  Palpation and auscultation of heart done , regular rate and rhythm, no  murmur, +2 pedal pulses, peripheral edema  trace  in both LE  ABDOMEN: soft, nontender, no  masses  M/S:   gait steady with walker.  MUHAMMAD with good strength. No joint inflammation  SKIN:  dressing to groin wound clean, dry, intact. No rashes or open areas  PSYCH:  oriented X 3, memory impaired , affect and mood normal    Labs:   Lab Results   Component Value Date    WBC 8.2 09/16/2019     Lab Results   Component Value Date    RBC 3.05 09/16/2019     Lab Results   Component Value Date    HGB 9.9 09/16/2019     Lab Results   Component Value Date    HCT 30.4 09/16/2019     Lab Results   Component Value Date     09/16/2019     Lab Results   Component Value Date    MCH 32.5 09/16/2019     Lab Results   Component Value Date    MCHC 32.6 09/16/2019     Lab Results   Component Value Date    RDW 15.8 09/16/2019     Lab Results   Component Value Date     09/16/2019     Last Comprehensive Metabolic Panel:  Sodium   Date Value Ref Range Status   09/16/2019 145 (H) 133 - 144 mmol/L Final     Potassium   Date Value Ref Range Status   09/16/2019 3.3 (L) 3.4 - 5.3 mmol/L Final     Chloride   Date Value Ref Range Status   09/16/2019 110 (H) 94 - 109 mmol/L Final "     Carbon Dioxide   Date Value Ref Range Status   09/16/2019 30 20 - 32 mmol/L Final     Anion Gap   Date Value Ref Range Status   09/16/2019 5 3 - 14 mmol/L Final     Glucose   Date Value Ref Range Status   09/16/2019 77 70 - 99 mg/dL Final     Urea Nitrogen   Date Value Ref Range Status   09/16/2019 34 (H) 7 - 30 mg/dL Final     Creatinine   Date Value Ref Range Status   09/16/2019 1.10 0.66 - 1.25 mg/dL Final     GFR Estimate   Date Value Ref Range Status   09/16/2019 61 >60 mL/min/[1.73_m2] Final     Comment:     Non  GFR Calc  Starting 12/18/2018, serum creatinine based estimated GFR (eGFR) will be   calculated using the Chronic Kidney Disease Epidemiology Collaboration   (CKD-EPI) equation.       Calcium   Date Value Ref Range Status   09/16/2019 8.1 (L) 8.5 - 10.1 mg/dL Final       ASSESSMENT / PLAN:  (L02.214) Abscess of groin, left  (primary encounter diagnosis)  (L03.818) Cellulitis of other specified site  Comment: completed Augmentin and Cipro 9/13/2019. Wound not examined  as he has an appointment in [facility] today.   Plan: continue current wound care, unless changed by [facility]. Urology follow up 9/30/2019.     (E11.8) Type 2 diabetes mellitus with complication, unspecified whether long term insulin use (H)  Comment: remains hyperglycemic later in the day.   Plan: increase am dose of Basaglar to 17 units, continue pm dose 14 units. Continue Novolog sliding scale. Monitor blood sugars qid. Dietician is involved for patient education.     (C91.90) CLL (chronic lymphocytic leukemia) (H)  (C81.10) Nodular sclerosing Hodgkin's lymphoma, unspecified body region (H)  (C71.9) Astrocytoma (H)  Comment: he has known liver masses, renal tumor and pancreatic cystic lesions. Cancers not currently active, per recent note from Oncology.   Plan: continue decadron. Follow up with Dr Parry to be scheduled.     (R00.0) Sinus tachycardia  Comment: rate controlled   Plan: continue  metoprolol. Monitor VS     (D64.9) Anemia, unspecified type  Comment: acute on chronic. Hgb is down slightly, no s/s of active bleeding. No rectal bleeding reported by staff.   Plan: follow Hgb     (R60.0) Bilateral leg edema  Comment: improved.   Plan: continue lasix. Continue compression stockings and elevate legs. Start KCl for mild hypokalemia.     (R53.81) Physical deconditioning  Comment: progressing in therapies   Plan: continue PHYSICAL THERAPY/OT. Disposition unclear at this time as he needs more assistance than his wife is able to manage at home. He's considering AL.       Electronically signed by:  CRISELDA Cohen CNP               Sincerely,        CRISELDA Cohen CNP

## 2019-09-16 NOTE — PROGRESS NOTES
[facility]  [place] Medical Record Number:  [id number]  Place of Service where encounter took place:  [facility] [[id number]]  Chief Complaint   Patient presents with     RECHECK       HPI:    Shaun Patel  is a 83 year old (1935), who is being seen today for an episodic care visit.  HPI information obtained from: facility chart records, facility staff, patient report and [facility] chart review.  He initially came to this facility 8/6/2019 following hospitalization at [facility] with left groin abscess, s/p I&D 7/31/2019. He was treated with IV antibiotics and discharged on Augmentin. He had poor wound healing and was rehospitalized 8/28/2019 for I&D  by Dr Curtis. Cultures grew E coli, Proteus and Enterococcus. ID consulted and he was treated with broad spectrum antibiotics. Discharged on Cipro and Augmentin through 9/13/2019.  He was noted to have rising blood sugars and HgbA1c was 9.3. Basaglar  and Novolog were started for new diagnosis of diabetes type 2. Decadron dose was decreased.  He had rectal bleeding and Hgb dropped to the 9s. He declined GI work up, as he has done during previous hospitalizations. [facility] nurse consulted and recommended bid wound care with Vashe moistened gauze, covered with ABD. He returned to the facility 9/4/2019 for ongoing rehab and medical management.        Today's concerns are:      Abscess of groin, left  Cellulitis of other specified site-finished antibiotics  9/13/2019. Afebrile. Reports minimal pain. Feeling well. Continues to have shortness of breath with exertion later in the day when he's fatigued. No cough or chest pain.   Type 2 diabetes mellitus with complication, unspecified whether long term insulin use (H)-Basaglar was last increased 9/13/2019. Blood sugars in am: . Blood sugars later in the day remain high: 280-390. Denies symptoms of hyperglycemia.   CLL (chronic lymphocytic leukemia) (H)  Nodular sclerosing Hodgkin's  lymphoma, unspecified body region (H)  Astrocytoma (H)  Sinus tachycardia-HR:    BPs: 93/59, 111/76, 101/65, 136/76  Anemia, unspecified type  Bilateral leg edema-weight stable at 210 lbs.   Physical deconditioning-ambulating >350 ft with walker and stand by assist. Requires min to mod assist with cares   SLUMS 22/30, CPT 5.2/5.6       Past Medical and Surgical History reviewed in Epic today.    MEDICATIONS:  Current Outpatient Medications   Medication Sig Dispense Refill     acetaminophen (TYLENOL) 325 MG tablet Take 650 mg by mouth every 4 hours as needed for fever or pain       dexamethasone (DECADRON) 2 MG tablet Take 2 mg by mouth 2 times daily (with meals)        FUROSEMIDE PO Take 20 mg by mouth every morning        insulin aspart (NOVOLOG PEN) 100 UNIT/ML pen Give subcutaneous TID with meals per blood glucose (mg/dL). Don't give corrective dose for PRN, post-prandial or nocturnal glucose checks unless ordered.  Blood Glucose.....Dose   <70...............See Hypoglycemia Protocol   ..........No insulin, give prandial insulin, if ordered  150-199........1 unit  200-249........2 units  250-299........3 units  300-349........4 units  350-399........5 units  400 or greater....6 units & call MD       insulin glargine (BASAGLAR KWIKPEN) 100 UNIT/ML pen Inject 17 Units Subcutaneous every morning       insulin glargine (BASAGLAR KWIKPEN) 100 UNIT/ML pen Inject 14 Units Subcutaneous At Bedtime        metoprolol succinate ER (TOPROL-XL) 25 MG 24 hr tablet Take 12.5 mg by mouth daily Take 0.5 tablets by mouth once daily       nystatin (MYCOSTATIN) 933433 UNIT/GM external powder Apply topically 2 times daily        pantoprazole (PROTONIX) 20 MG EC tablet Take 40 mg by mouth daily       Skin Protectants, Misc. (EUCERIN) cream Apply topically as needed for dry skin Apply to bilateral feet and LE topically one time a day for dry skin and Apply to bilateral feet and LE topically as needed for dry skin    "      REVIEW OF SYSTEMS:  4 point ROS including Respiratory, CV, GI and , other than that noted in the HPI,  is negative    Objective:  /78   Pulse 104   Temp 96.1  F (35.6  C)   Resp 18   Ht 1.803 m (5' 11\")   Wt 95.3 kg (210 lb)   SpO2 97%   BMI 29.29 kg/m    Exam:  GENERAL APPEARANCE:  Alert, in no distress  ENT:  Mouth and posterior oropharynx normal, moist mucous membranes, [place]  EYES:  EOM normal, conjunctiva and lids normal  NECK:  No adenopathy,masses or thyromegaly  RESP:  respiratory effort and palpation of chest normal, lungs clear to auscultation , no respiratory distress  CV:  Palpation and auscultation of heart done , regular rate and rhythm, no  murmur, +2 pedal pulses, peripheral edema  trace  in both LE  ABDOMEN: soft, nontender, no  masses  M/S:   gait steady with walker.  [place]UHAMMAD with good strength. No joint inflammation  SKIN:  dressing to groin wound clean, dry, intact. No rashes or open areas  PSYCH:  oriented X 3, memory impaired , affect and mood normal    Labs:   Lab Results   Component Value Date    WBC 8.2 09/16/2019     Lab Results   Component Value Date    RBC 3.05 09/16/2019     Lab Results   Component Value Date    HGB 9.9 09/16/2019     Lab Results   Component Value Date    HCT 30.4 09/16/2019     Lab Results   Component Value Date     09/16/2019     Lab Results   Component Value Date    MCH 32.5 09/16/2019     Lab Results   Component Value Date    MCHC 32.6 09/16/2019     Lab Results   Component Value Date    RDW 15.8 09/16/2019     Lab Results   Component Value Date     09/16/2019     Last Comprehensive Metabolic Panel:  Sodium   Date Value Ref Range Status   09/16/2019 145 (H) 133 - 144 mmol/L Final     Potassium   Date Value Ref Range Status   09/16/2019 3.3 (L) 3.4 - 5.3 mmol/L Final     Chloride   Date Value Ref Range Status   09/16/2019 110 (H) 94 - 109 mmol/L Final     Carbon Dioxide   Date Value Ref Range Status   09/16/2019 30 20 - 32 mmol/L Final "     Anion Gap   Date Value Ref Range Status   09/16/2019 5 3 - 14 mmol/L Final     Glucose   Date Value Ref Range Status   09/16/2019 77 70 - 99 mg/dL Final     Urea Nitrogen   Date Value Ref Range Status   09/16/2019 34 (H) 7 - 30 mg/dL Final     Creatinine   Date Value Ref Range Status   09/16/2019 1.10 0.66 - 1.25 mg/dL Final     GFR Estimate   Date Value Ref Range Status   09/16/2019 61 >60 mL/min/[1.73_m2] Final     Comment:     Non  GFR Calc  Starting 12/18/2018, serum creatinine based estimated GFR (eGFR) will be   calculated using the Chronic Kidney Disease Epidemiology Collaboration   (CKD-EPI) equation.       Calcium   Date Value Ref Range Status   09/16/2019 8.1 (L) 8.5 - 10.1 mg/dL Final       ASSESSMENT / PLAN:  (L02.214) Abscess of groin, left  (primary encounter diagnosis)  (L03.818) Cellulitis of other specified site  Comment: completed Augmentin and Cipro 9/13/2019. Wound not examined  as he has an appointment in the [facility]ound Clinic today.   Plan: continue current wound care, unless changed by [facility]. Urology follow up 9/30/2019.     (E11.8) Type 2 diabetes mellitus with complication, unspecified whether long term insulin use (H)  Comment: remains hyperglycemic later in the day.   Plan: increase am dose of Basaglar to 17 units, continue pm dose 14 units. Continue Novolog sliding scale. Monitor blood sugars qid. Dietician is involved for patient education.     (C91.90) CLL (chronic lymphocytic leukemia) (H)  (C81.10) Nodular sclerosing Hodgkin's lymphoma, unspecified body region (H)  (C71.9) Astrocytoma (H)  Comment: he has known liver masses, renal tumor and pancreatic cystic lesions. Cancers not currently active, per recent note from Oncology.   Plan: continue decadron. Follow up with Dr Parry to be scheduled.     (R00.0) Sinus tachycardia  Comment: rate controlled   Plan: continue metoprolol. Monitor VS     (D64.9) Anemia, unspecified type  Comment: acute on chronic. Hgb is  down slightly, no s/s of active bleeding. No rectal bleeding reported by staff.   Plan: follow Hgb     (R60.0) Bilateral leg edema  Comment: improved.   Plan: continue lasix. Continue compression stockings and elevate legs. Start KCl for mild hypokalemia.     (R53.81) Physical deconditioning  Comment: progressing in therapies   Plan: continue PHYSICAL THERAPY/OT. Disposition unclear at this time as he needs more assistance than his wife is able to manage at home. He's considering AL.       Electronically signed by:  CRISELDA Cohen CNP

## 2019-09-18 VITALS
SYSTOLIC BLOOD PRESSURE: 132 MMHG | DIASTOLIC BLOOD PRESSURE: 76 MMHG | HEIGHT: 71 IN | OXYGEN SATURATION: 95 % | TEMPERATURE: 97 F | RESPIRATION RATE: 16 BRPM | HEART RATE: 84 BPM | WEIGHT: 210 LBS | BODY MASS INDEX: 29.4 KG/M2

## 2019-09-18 RX ORDER — POTASSIUM CHLORIDE 750 MG/1
10 TABLET, EXTENDED RELEASE ORAL DAILY
COMMUNITY

## 2019-09-18 ASSESSMENT — MIFFLIN-ST. JEOR: SCORE: 1669.68

## 2019-09-19 ENCOUNTER — NURSING HOME VISIT (OUTPATIENT)
Dept: GERIATRICS | Facility: CLINIC | Age: 84
End: 2019-09-19
Payer: COMMERCIAL

## 2019-09-19 ENCOUNTER — HOSPITAL LABORATORY (OUTPATIENT)
Dept: OTHER | Facility: CLINIC | Age: 84
End: 2019-09-19

## 2019-09-19 DIAGNOSIS — C81.10 NODULAR SCLEROSING HODGKIN'S LYMPHOMA, UNSPECIFIED BODY REGION (H): ICD-10-CM

## 2019-09-19 DIAGNOSIS — C71.9 ASTROCYTOMA (H): ICD-10-CM

## 2019-09-19 DIAGNOSIS — L02.214 ABSCESS OF GROIN, LEFT: ICD-10-CM

## 2019-09-19 DIAGNOSIS — R00.0 SINUS TACHYCARDIA: ICD-10-CM

## 2019-09-19 DIAGNOSIS — L03.818 CELLULITIS OF OTHER SPECIFIED SITE: ICD-10-CM

## 2019-09-19 DIAGNOSIS — C91.10 CLL (CHRONIC LYMPHOCYTIC LEUKEMIA) (H): ICD-10-CM

## 2019-09-19 DIAGNOSIS — R06.02 SHORTNESS OF BREATH: Primary | ICD-10-CM

## 2019-09-19 DIAGNOSIS — D64.9 ANEMIA, UNSPECIFIED TYPE: ICD-10-CM

## 2019-09-19 DIAGNOSIS — R60.0 BILATERAL LEG EDEMA: ICD-10-CM

## 2019-09-19 DIAGNOSIS — E11.8 TYPE 2 DIABETES MELLITUS WITH COMPLICATION, UNSPECIFIED WHETHER LONG TERM INSULIN USE: ICD-10-CM

## 2019-09-19 DIAGNOSIS — R53.81 PHYSICAL DECONDITIONING: ICD-10-CM

## 2019-09-19 LAB
ANION GAP SERPL CALCULATED.3IONS-SCNC: 10 MMOL/L (ref 3–14)
BUN SERPL-MCNC: 36 MG/DL (ref 7–30)
CALCIUM SERPL-MCNC: 7.9 MG/DL (ref 8.5–10.1)
CHLORIDE SERPL-SCNC: 105 MMOL/L (ref 94–109)
CO2 SERPL-SCNC: 26 MMOL/L (ref 20–32)
CREAT SERPL-MCNC: 1.07 MG/DL (ref 0.66–1.25)
GFR SERPL CREATININE-BSD FRML MDRD: 63 ML/MIN/{1.73_M2}
GLUCOSE SERPL-MCNC: 221 MG/DL (ref 70–99)
HGB BLD-MCNC: 11.7 G/DL (ref 13.3–17.7)
POTASSIUM SERPL-SCNC: 3.4 MMOL/L (ref 3.4–5.3)
SODIUM SERPL-SCNC: 141 MMOL/L (ref 133–144)

## 2019-09-19 PROCEDURE — 99310 SBSQ NF CARE HIGH MDM 45: CPT | Performed by: NURSE PRACTITIONER

## 2019-09-19 NOTE — LETTER
9/19/2019        RE: Shaun Patel  [address]  [facility] [address]        [place] [facility]  [place] Medical Record Number:  [id number]  [facility] of Service where encounter took [facility] [[id number]]  Chief Complaint   Patient presents with     RECHECK       HPI:    Shaun Patel  is a 83 year old (1935), who is being seen today for an episodic care visit.  HPI information obtained from: facility chart records, facility staff, patient report, [facility] chart review and Care Everywhere Psychiatric chart review.   He initially came to this facility 8/6/2019 following hospitalization at [facility] with left groin abscess, s/p I&D 7/31/2019. He was treated with IV antibiotics and discharged on Augmentin. He had poor wound healing and was rehospitalized 8/28/2019 for I&D  by Dr Curtis. Cultures grew E coli, Proteus and Enterococcus. ID consulted and he was treated with broad spectrum antibiotics. Discharged on Cipro and Augmentin through 9/13/2019.  He was noted to have rising blood sugars and HgbA1c was 9.3. Basaglar  and Novolog were started for new diagnosis of diabetes type 2. Decadron dose was decreased.  He had rectal bleeding and Hgb dropped to the 9s. He declined GI work up, as he has done during previous hospitalizations. [facility] nurse consulted and recommended bid wound care with Vashe moistened gauze, covered with ABD. He returned to the facility 9/4/2019 for ongoing rehab and medical management.       Today's concerns are:      Shortness of breath-he continues to reports shortness of breath with activity, worse later in the day when he's fatigued. This started over 3 months ago and he does not feel it's any worse. No cough or chest pain. LE edema has improved and weight is stable at 210 lbs. Therapist reports that he talks throughout therapy, which contributes to his shortness of breath. He recovers within 1-2 minutes. No hypoxia. Afebrile.   Abscess of groin,  left-minimal pain during wound care.   Cellulitis of other specified site  Type 2 diabetes mellitus with complication, unspecified whether long term insulin use (H)-am blood sugars have improved in the 112-173 range. Continues to have readings >300 later in the day. Not fully compliant with diabetic diet and has 2 bottles of regular Coke at his bedside.   CLL (chronic lymphocytic leukemia) (H)  Nodular sclerosing Hodgkin's lymphoma, unspecified body region (H)  Astrocytoma (H)  Sinus tachycardia-HR:    BPs: 132/76, 106/67, 125/75  Anemia, unspecified type  Bilateral leg edema  Physical deconditioning-ambulating 280 ft with walker and stand by to contact guard assist. Requires assist of 1 with cares.     Past Medical and Surgical History reviewed in Epic today.    MEDICATIONS:  Current Outpatient Medications   Medication Sig Dispense Refill     acetaminophen (TYLENOL) 325 MG tablet Take 650 mg by mouth every 4 hours as needed for fever or pain       dexamethasone (DECADRON) 2 MG tablet Take 2 mg by mouth 2 times daily (with meals)        FUROSEMIDE PO Take 20 mg by mouth every morning        insulin aspart (NOVOLOG PEN) 100 UNIT/ML pen Give subcutaneous TID with meals per blood glucose (mg/dL). Don't give corrective dose for PRN, post-prandial or nocturnal glucose checks unless ordered.  Blood Glucose.....Dose   <70...............See Hypoglycemia Protocol   ..........No insulin, give prandial insulin, if ordered  150-199........1 unit  200-249........2 units  250-299........3 units  300-349........4 units  350-399........5 units  400 or greater....6 units & call MD       insulin glargine (BASAGLAR KWIKPEN) 100 UNIT/ML pen Inject 17 Units Subcutaneous every morning       insulin glargine (BASAGLAR KWIKPEN) 100 UNIT/ML pen Inject 14 Units Subcutaneous At Bedtime        metoprolol succinate ER (TOPROL-XL) 25 MG 24 hr tablet Take 12.5 mg by mouth daily Take 0.5 tablets by mouth once daily       nystatin  "(MYCOSTATIN) 656960 UNIT/GM external powder Apply topically 2 times daily        pantoprazole (PROTONIX) 20 MG EC tablet Take 40 mg by mouth daily       potassium chloride ER (K-DUR/KLOR-CON M) 10 MEQ CR tablet Take 10 mEq by mouth daily       Skin Protectants, Misc. (EUCERIN) cream Apply topically as needed for dry skin Apply to bilateral feet and LE topically one time a day for dry skin and Apply to bilateral feet and LE topically as needed for dry skin         REVIEW OF SYSTEMS:  10 point ROS of systems including Constitutional, Eyes, Respiratory, Cardiovascular, Gastroenterology, Genitourinary, Integumentary, Musculoskeletal, Psychiatric were all negative except for pertinent positives noted in my HPI.    Objective:  /76   Pulse 84   Temp 97  F (36.1  C)   Resp 16   Ht 1.803 m (5' 11\")   Wt 95.3 kg (210 lb)   SpO2 95%   BMI 29.29 kg/m     Exam:  GENERAL APPEARANCE:  Alert, in no distress  ENT:  Mouth and posterior oropharynx normal, moist mucous membranes, [place]  EYES:  EOM normal, conjunctiva and lids normal  NECK:  No adenopathy,masses or thyromegaly  RESP:  respiratory effort and palpation of chest normal, lungs clear to auscultation , no respiratory distress  CV:  Palpation and auscultation of heart done , regular rate and rhythm, no  murmur, +2 pedal pulses, peripheral edema  trace  in both LE  ABDOMEN: soft, nontender, no  masses  M/S:   gait steady with walker.  MUHAMMAD with good strength. No joint inflammation  SKIN: left groin wound clean with granulation and small areas of slough, mild induration. No rashes or other open areas  PSYCH:  oriented X 3, memory impaired , affect and mood normal    Labs:   Labs done in SNF are in [place] EPIC. Please refer to them using EPIC/Care Everywhere.    ASSESSMENT / PLAN:  (R06.02) Shortness of breath  (primary encounter diagnosis)  Comment: ongoing for at least 3 months and no worse, per patient report. He and his family \"want to know what is causing this.\" " "I've explained that this is likely  multifactorial with general deconditioning, multiple comorbidities and treatment for pneumonia 6/2019 all contributing. CXR done 9/6/2019 showed chronic lung changes, no infiltrate, effusion or pulmonary edema. 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 No hypoxia or fever and no s/s of volume overload. Daughter reports he has a recent cardiac work up that was \"normal.\"   Plan: discussed at length with patient and with his daughter Yumiko Gonzales on the phone. Increase lasix for 2 days to see if that makes a difference. Offered to schedule a CT at [facility], but they prefer CTs be done at [facility], which is understandable. Offered to send patient to the [facility] ED for immediate work up, which they declined. Daughter plans to talk to patient's oncologist to schedule CTs.   Discussed with facility staff and instructed them to send patient to [facility] ED at any time requested by patient and family.     (L02.214) Abscess of groin, left  (L03.818) Cellulitis of other specified site  Comment: wound is slowly healing. No signs of infection. He was seen in [facility] 9/16/2019. Completed Augmentin and Cipro 9/13/2019.   Plan: continue current wound care. Urology follow up 9/30/2019.     (E11.8) Type 2 diabetes mellitus with complication, unspecified whether long term insulin use (H)  Comment: am blood sugars improving, readings remain high later in the day   Plan: increase am dose of Basaglar to 20 units, continue pm dose 14 units. Continue Novolog sliding scale. Monitor blood sugars qid. Ongoing patient education re: diet, insulin admin and checking blood sugars-discussed with facility staff.     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 His " daughter has many questions/concerns re: the status of his cancers and I've asked her to follow up with Dr Parry.   Plan: per Oncology. Continue decadron.     (R00.0) Sinus tachycardia  Comment: rate controlled   Plan: continue metoprolol. Monitor VS     (D64.9) Anemia, unspecified type  Comment: Hgb improved at 11.7   Plan: follow labs     (R60.0) Bilateral leg edema  Comment: improved and weight stable  Plan: lasix as above. Continue compression stockings.     (R53.81) Physical deconditioning  Comment: slowly progressing in therapies   Plan: continue PHYSICAL THERAPY/OT. Disposition unclear at this time as his wife is unable to assist with his cares at home. Family is considering AL       Total time spent with patient visit at the skilled nursing facility was 50  min including patient visit, review of past records and phone call to patient contact. Greater than 50% of total time spent with counseling and coordinating care due to counseling patient/resident and family for 40 minutes on: respiratory symptom and options for further work up, the reason for their hospitalization and what the treatment is, current medications and treatments, reviewing hospital lab and imaging results and subsequent treatment plan.    Electronically signed by:  Michela Gonzalez CNA               Sincerely,        CRISELDA Cohen CNP

## 2019-09-19 NOTE — PROGRESS NOTES
[place] [facility]  [place] Medical Record Number:  [id number]  Place of Service where encounter took place:  [facility] [[id number]]  Chief Complaint   Patient presents with     RECHECK       HPI:    Shaun Patel  is a 83 year old (1935), who is being seen today for an episodic care visit.  HPI information obtained from: facility chart records, facility staff, patient report, [facility] chart review and Care Everywhere [facility] chart review.   He initially came to this facility 8/6/2019 following hospitalization at [facility] with left groin abscess, s/p I&D 7/31/2019. He was treated with IV antibiotics and discharged on Augmentin. He had poor wound healing and was rehospitalized 8/28/2019 for I&D  by Dr Curtis. Cultures grew E coli, Proteus and Enterococcus. ID consulted and he was treated with broad spectrum antibiotics. Discharged on Cipro and Augmentin through 9/13/2019.  He was noted to have rising blood sugars and HgbA1c was 9.3. Basaglar  and Novolog were started for new diagnosis of diabetes type 2. Decadron dose was decreased.  He had rectal bleeding and Hgb dropped to the 9s. He declined GI work up, as he has done during previous hospitalizations. [facility] nurse consulted and recommended bid wound care with Vashe moistened gauze, covered with ABD. He returned to the facility 9/4/2019 for ongoing rehab and medical management.       Today's concerns are:      Shortness of breath-he continues to reports shortness of breath with activity, worse later in the day when he's fatigued. This started over 3 months ago and he does not feel it's any worse. No cough or chest pain. LE edema has improved and weight is stable at 210 lbs. Therapist reports that he talks throughout therapy, which contributes to his shortness of breath. He recovers within 1-2 minutes. No hypoxia. Afebrile.   Abscess of groin, left-minimal pain during wound care.   Cellulitis of other specified site  Type 2 diabetes  mellitus with complication, unspecified whether long term insulin use (H)-am blood sugars have improved in the 112-173 range. Continues to have readings >300 later in the day. Not fully compliant with diabetic diet and has 2 bottles of regular Coke at his bedside.   CLL (chronic lymphocytic leukemia) (H)  Nodular sclerosing Hodgkin's lymphoma, unspecified body region (H)  Astrocytoma (H)  Sinus tachycardia-HR:    BPs: 132/76, 106/67, 125/75  Anemia, unspecified type  Bilateral leg edema  Physical deconditioning-ambulating 280 ft with walker and stand by to contact guard assist. Requires assist of 1 with cares.     Past Medical and Surgical History reviewed in Epic today.    MEDICATIONS:  Current Outpatient Medications   Medication Sig Dispense Refill     acetaminophen (TYLENOL) 325 MG tablet Take 650 mg by mouth every 4 hours as needed for fever or pain       dexamethasone (DECADRON) 2 MG tablet Take 2 mg by mouth 2 times daily (with meals)        FUROSEMIDE PO Take 20 mg by mouth every morning        insulin aspart (NOVOLOG PEN) 100 UNIT/ML pen Inject Subcutaneous 3 times daily (with meals) Give subcutaneous TID with meals per blood glucose (mg/dL). Don't give corrective dose for PRN, post-prandial or nocturnal glucose checks unless ordered.  Blood Glucose.....Dose   <70...............See Hypoglycemia Protocol   ..........No insulin, give prandial insulin, if ordered  150-199........1 unit  200-249........2 units  250-299........3 units  300-349........4 units  350-399........5 units  400 or greater....6 units & call MD        insulin glargine (BASAGLAR KWIKPEN) 100 UNIT/ML pen Inject 23 Units Subcutaneous every morning        insulin glargine (BASAGLAR KWIKPEN) 100 UNIT/ML pen Inject 17 Units Subcutaneous At Bedtime        metoprolol succinate ER (TOPROL-XL) 25 MG 24 hr tablet Take 12.5 mg by mouth daily        nystatin (MYCOSTATIN) 370510 UNIT/GM external powder Apply topically 2 times daily         "pantoprazole (PROTONIX) 20 MG EC tablet Take 40 mg by mouth daily       potassium chloride ER (K-DUR/KLOR-CON M) 10 MEQ CR tablet Take 10 mEq by mouth daily       Skin Protectants, Misc. (EUCERIN) cream Apply topically as needed for dry skin Apply to bilateral feet and LE topically one time a day for dry skin and Apply to bilateral feet and LE topically as needed for dry skin       insulin aspart (NOVOLOG PEN) 100 UNIT/ML pen Inject Subcutaneous At Bedtime Inject as per sliding scale: if 0 - 399 = 0 No insulin for BS < 400; 400+ = 4, subcutaneously at bedtime         REVIEW OF SYSTEMS:  10 point ROS of systems including Constitutional, Eyes, Respiratory, Cardiovascular, Gastroenterology, Genitourinary, Integumentary, Musculoskeletal, Psychiatric were all negative except for pertinent positives noted in my HPI.    Objective:  /76   Pulse 84   Temp 97  F (36.1  C)   Resp 16   Ht 1.803 m (5' 11\")   Wt 95.3 kg (210 lb)   SpO2 95%   BMI 29.29 kg/m    Exam:  GENERAL APPEARANCE:  Alert, in no distress  ENT:  Mouth and posterior oropharynx normal, moist mucous membranes, [place]  EYES:  EOM normal, conjunctiva and lids normal  NECK:  No adenopathy,masses or thyromegaly  RESP:  respiratory effort and palpation of chest normal, lungs clear to auscultation , no respiratory distress  CV:  Palpation and auscultation of heart done , regular rate and rhythm, no  murmur, +2 pedal pulses, peripheral edema  trace  in both LE  ABDOMEN: soft, nontender, no  masses  M/S:   gait steady with walker.  MUHAMMAD with good strength. No joint inflammation  SKIN: left groin wound clean with granulation and small areas of slough, mild induration. No rashes or other open areas  PSYCH:  oriented X 3, memory impaired , affect and mood normal    Labs:   Labs done in SNF are in [place] EPIC. Please refer to them using EPIC/Care Everywhere.    ASSESSMENT / PLAN:  (R06.02) Shortness of breath  (primary encounter diagnosis)  Comment: ongoing for at " "least 3 months and no worse, per patient report. He and his family \"want to know what is causing this.\" I've explained that this is likely  multifactorial with general deconditioning, multiple comorbidities and treatment for pneumonia 6/2019 all contributing. CXR done 9/6/2019 showed chronic lung changes, no infiltrate, effusion or pulmonary edema. 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 No hypoxia or fever and no s/s of volume overload. Daughter reports he has a recent cardiac work up that was \"normal.\"   Plan: discussed at length with patient and with his daughter Yumiko Gonzales on the phone. Increase lasix for 2 days to see if that makes a difference. Offered to schedule a CT at [facility], but they prefer CTs be done at [facility], which is understandable. Offered to send patient to the [facility] ED for immediate work up, which they declined. Daughter plans to talk to patient's oncologist to schedule CTs.   Discussed with facility staff and instructed them to send patient to [facility] ED at any time requested by patient and family.     (L02.214) Abscess of groin, left  (L03.818) Cellulitis of other specified site  Comment: wound is slowly healing. No signs of infection. He was seen in [facility] 9/16/2019. Completed Augmentin and Cipro 9/13/2019.   Plan: continue current wound care. Urology follow up 9/30/2019.     (E11.8) Type 2 diabetes mellitus with complication, unspecified whether long term insulin use (H)  Comment: am blood sugars improving, readings remain high later in the day   Plan: increase am dose of Basaglar to 20 units, continue pm dose 14 units. Continue Novolog sliding scale. Monitor blood sugars qid. Ongoing patient education re: diet, insulin admin and checking blood sugars-discussed with facility staff.     (C91.90) CLL (chronic lymphocytic leukemia) (H)  (C81.10) Nodular sclerosing Hodgkin's lymphoma, unspecified body region (H)  (C71.9) " Astrocytoma (H)  Comment: with known liver masses, renal tumor and pancreatic cystic lesions. His daughter has many questions/concerns re: the status of his cancers and I've asked her to follow up with Dr Parry.   Plan: per Oncology. Continue decadron.     (R00.0) Sinus tachycardia  Comment: rate controlled   Plan: continue metoprolol. Monitor VS     (D64.9) Anemia, unspecified type  Comment: Hgb improved at 11.7   Plan: follow labs     (R60.0) Bilateral leg edema  Comment: improved and weight stable  Plan: lasix as above. Continue compression stockings.     (R53.81) Physical deconditioning  Comment: slowly progressing in therapies   Plan: continue PHYSICAL THERAPY/OT. Disposition unclear at this time as his wife is unable to assist with his cares at home. Family is considering AL       Total time spent with patient visit at the skilled nursing facility was 50  min including patient visit, review of past records and phone call to patient contact. Greater than 50% of total time spent with counseling and coordinating care due to counseling patient/resident and family for 40 minutes on: respiratory symptom and options for further work up, the reason for their hospitalization and what the treatment is, current medications and treatments, reviewing hospital lab and imaging results and subsequent treatment plan.    Electronically signed by:  CRISELDA Cohen CNP

## 2019-09-24 ENCOUNTER — NURSING HOME VISIT (OUTPATIENT)
Dept: GERIATRICS | Facility: CLINIC | Age: 84
End: 2019-09-24
Payer: COMMERCIAL

## 2019-09-24 ENCOUNTER — HOSPITAL LABORATORY (OUTPATIENT)
Dept: OTHER | Facility: CLINIC | Age: 84
End: 2019-09-24

## 2019-09-24 VITALS
TEMPERATURE: 97 F | RESPIRATION RATE: 16 BRPM | SYSTOLIC BLOOD PRESSURE: 134 MMHG | BODY MASS INDEX: 29.4 KG/M2 | OXYGEN SATURATION: 93 % | WEIGHT: 210 LBS | DIASTOLIC BLOOD PRESSURE: 79 MMHG | HEIGHT: 71 IN | HEART RATE: 80 BPM

## 2019-09-24 DIAGNOSIS — L02.214 ABSCESS OF GROIN, LEFT: Primary | ICD-10-CM

## 2019-09-24 DIAGNOSIS — C81.10 NODULAR SCLEROSING HODGKIN'S LYMPHOMA, UNSPECIFIED BODY REGION (H): ICD-10-CM

## 2019-09-24 DIAGNOSIS — R06.02 SHORTNESS OF BREATH: ICD-10-CM

## 2019-09-24 DIAGNOSIS — L03.818 CELLULITIS OF OTHER SPECIFIED SITE: ICD-10-CM

## 2019-09-24 DIAGNOSIS — E11.8 TYPE 2 DIABETES MELLITUS WITH COMPLICATION, UNSPECIFIED WHETHER LONG TERM INSULIN USE: ICD-10-CM

## 2019-09-24 DIAGNOSIS — R60.0 BILATERAL LEG EDEMA: ICD-10-CM

## 2019-09-24 DIAGNOSIS — D64.9 ANEMIA, UNSPECIFIED TYPE: ICD-10-CM

## 2019-09-24 DIAGNOSIS — C91.10 CLL (CHRONIC LYMPHOCYTIC LEUKEMIA) (H): ICD-10-CM

## 2019-09-24 DIAGNOSIS — R53.81 PHYSICAL DECONDITIONING: ICD-10-CM

## 2019-09-24 DIAGNOSIS — R00.0 SINUS TACHYCARDIA: ICD-10-CM

## 2019-09-24 DIAGNOSIS — C71.9 ASTROCYTOMA (H): ICD-10-CM

## 2019-09-24 LAB
ANION GAP SERPL CALCULATED.3IONS-SCNC: 7 MMOL/L (ref 3–14)
BUN SERPL-MCNC: 35 MG/DL (ref 7–30)
CALCIUM SERPL-MCNC: 8.1 MG/DL (ref 8.5–10.1)
CHLORIDE SERPL-SCNC: 106 MMOL/L (ref 94–109)
CO2 SERPL-SCNC: 26 MMOL/L (ref 20–32)
CREAT SERPL-MCNC: 1.1 MG/DL (ref 0.66–1.25)
ERYTHROCYTE [DISTWIDTH] IN BLOOD BY AUTOMATED COUNT: 16.1 % (ref 10–15)
GFR SERPL CREATININE-BSD FRML MDRD: 61 ML/MIN/{1.73_M2}
GLUCOSE SERPL-MCNC: 100 MG/DL (ref 70–99)
HCT VFR BLD AUTO: 30.8 % (ref 40–53)
HGB BLD-MCNC: 9.8 G/DL (ref 13.3–17.7)
MCH RBC QN AUTO: 31.3 PG (ref 26.5–33)
MCHC RBC AUTO-ENTMCNC: 31.8 G/DL (ref 31.5–36.5)
MCV RBC AUTO: 98 FL (ref 78–100)
PLATELET # BLD AUTO: 95 10E9/L (ref 150–450)
POTASSIUM SERPL-SCNC: 4 MMOL/L (ref 3.4–5.3)
RBC # BLD AUTO: 3.13 10E12/L (ref 4.4–5.9)
SODIUM SERPL-SCNC: 139 MMOL/L (ref 133–144)
WBC # BLD AUTO: 11.8 10E9/L (ref 4–11)

## 2019-09-24 PROCEDURE — 99309 SBSQ NF CARE MODERATE MDM 30: CPT | Performed by: NURSE PRACTITIONER

## 2019-09-24 ASSESSMENT — MIFFLIN-ST. JEOR: SCORE: 1669.68

## 2019-09-24 NOTE — LETTER
9/24/2019        RE: Shaun Patel  [address]  [facility] [id number]        [place] [facility]  [facility] Record Number:  [id number]  [facility] of [facility] where encounter took place:  [facility] [[id number]]  Chief Complaint   Patient presents with     RECHECK       HPI:    Shaun Patel  is a 83 year old (1935), who is being seen today for an episodic care visit.  HPI information obtained from: facility chart records, facility staff, patient report and [facility] chart review.  He initially came to this facility 8/6/2019 following hospitalization at [facility] with left groin abscess, s/p I&D 7/31/2019. He was treated with IV antibiotics and discharged on Augmentin. He had poor wound healing and was rehospitalized 8/28/2019 for I&D  by Dr Curtis. Cultures grew E coli, Proteus and Enterococcus. ID consulted and he was treated with broad spectrum antibiotics. Discharged on Cipro and Augmentin through 9/13/2019.  He was noted to have rising blood sugars and HgbA1c was 9.3. Basaglar  and Novolog were started for new diagnosis of diabetes type 2. Decadron dose was decreased.  He had rectal bleeding and Hgb dropped to the 9s. He declined GI work up, as he has done during previous hospitalizations. [facility] nurse consulted and recommended bid wound care with Vashe moistened gauze, covered with ABD. He returned to the facility 9/4/2019 for ongoing rehab and medical management.      Today's concerns are:      Abscess of groin, left-afebrile. Denies pain. He was seen in the [facility] 9/16/2019 with no change in wound care.   Cellulitis of other specified site  Type 2 diabetes mellitus with complication, unspecified whether long term insulin use-insulin has been gradually improved. Blood sugars in am: , lunch: 120-290, dinner: 238-241, HS: 217-323  He's trying to be more careful with his diet.   CLL (chronic lymphocytic leukemia) (H)  Astrocytoma (H)  Nodular sclerosing  Hodgkin's lymphoma, unspecified body region (H)  Shortness of breath-reports this is unchanged. No hypoxia. No cough or chest pain. Additional lasix 20 mg daily given 9/20 and 9/21/2019 with no change in his breathing.   Sinus tachycardia-HR: 80-98   BPs: 134/79, 125/76, 130/77   Anemia, unspecified type  Bilateral leg edema-no calf pain. Weight unchanged 210 lbs.   Physical deconditioning-ambulating 280 ft with walker and supervision. Requires stand by assist with cares.   SLUMS 22/30, CPT 5.2/5.6     Past Medical and Surgical History reviewed in Epic today.    MEDICATIONS:  Current Outpatient Medications   Medication Sig Dispense Refill     acetaminophen (TYLENOL) 325 MG tablet Take 650 mg by mouth every 4 hours as needed for fever or pain       dexamethasone (DECADRON) 2 MG tablet Take 2 mg by mouth 2 times daily (with meals)        FUROSEMIDE PO Take 20 mg by mouth every morning        insulin aspart (NOVOLOG PEN) 100 UNIT/ML pen Inject Subcutaneous At Bedtime Inject as per sliding scale: if 0 - 399 = 0 No insulin for BS < 400; 400+ = 4, subcutaneously at bedtime       insulin aspart (NOVOLOG PEN) 100 UNIT/ML pen Inject Subcutaneous 3 times daily (with meals) Give subcutaneous TID with meals per blood glucose (mg/dL). Don't give corrective dose for PRN, post-prandial or nocturnal glucose checks unless ordered.  Blood Glucose.....Dose   <70...............See Hypoglycemia Protocol   ..........No insulin, give prandial insulin, if ordered  150-199........1 unit  200-249........2 units  250-299........3 units  300-349........4 units  350-399........5 units  400 or greater....6 units & call MD        insulin glargine (BASAGLAR KWIKPEN) 100 UNIT/ML pen Inject 23 Units Subcutaneous every morning        insulin glargine (BASAGLAR KWIKPEN) 100 UNIT/ML pen Inject 17 Units Subcutaneous At Bedtime        metoprolol succinate ER (TOPROL-XL) 25 MG 24 hr tablet Take 12.5 mg by mouth daily        nystatin (MYCOSTATIN)  "663463 UNIT/GM external powder Apply topically 2 times daily        pantoprazole (PROTONIX) 20 MG EC tablet Take 40 mg by mouth daily       potassium chloride ER (K-DUR/KLOR-CON M) 10 MEQ CR tablet Take 10 mEq by mouth daily       Skin Protectants, Misc. (EUCERIN) cream Apply topically as needed for dry skin Apply to bilateral feet and LE topically one time a day for dry skin and Apply to bilateral feet and LE topically as needed for dry skin         REVIEW OF SYSTEMS:  10 point ROS of systems including Constitutional, Eyes, Respiratory, Cardiovascular, Gastroenterology, Genitourinary, Integumentary, Musculoskeletal, Psychiatric were all negative except for pertinent positives noted in my HPI.    Objective:  /79   Pulse 80   Temp 97  F (36.1  C)   Resp 16   Ht 1.803 m (5' 11\")   Wt 95.3 kg (210 lb)   SpO2 93%   BMI 29.29 kg/m     Exam:  GENERAL APPEARANCE:  Alert, in no distress  ENT:  Mouth and posterior oropharynx normal, moist mucous membranes, [place]  EYES:  EOM normal, conjunctiva and lids normal  NECK:  No adenopathy,masses or thyromegaly  RESP:  respiratory effort and palpation of chest normal, lungs clear to auscultation , no respiratory distress  CV:  Palpation and auscultation of heart done , regular rate and rhythm, no  murmur, +2 pedal pulses, peripheral edema  trace  in both LE  ABDOMEN: soft, nontender, no  masses  M/S:   gait steady with walker.  MUHAMMAD with good strength. No joint inflammation  SKIN: left groin wound clean with granulation and small areas of slough, mild induration.  Mild erythema of area under the tape, no other rashes or open areas  PSYCH:  oriented X 3, memory impaired , affect and mood normal    Labs:   Labs done in SNF are in [place] EPIC. Please refer to them using EPIC/Care Everywhere.    ASSESSMENT / PLAN:  (L02.214) Abscess of groin, left  (primary encounter diagnosis)  (L03.818) Cellulitis of other specified site  Comment: wound is healing well with no signs of " infection. Skin irritation from tape. Completed Augmentin and Cipro 9/13/2019.   Plan: continue wound care as ordered. Barrier cream to irritated skin and reassess in a few days. Urology follow up 9/30/2019.     (E11.8) Type 2 diabetes mellitus with complication, unspecified whether long term insulin use (H)  Comment: improved control, but still hyperglycemic later in the day .  Plan: increase am Basaglar to 23 units, continue 17 in pm. Monitor blood sugars qid. Ongoing patient education re: diet, checking blood sugars and self administering insulin.   Discussed with nurse manager.     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   Plan: per Oncology. Continue decadron.     (R06.02) Shortness of breath  Comment: SOB for over 3 months, no worsening of symptoms and no signs of acute process. CXR done 9/6/2019 showed chronic lung changes, no infiltrate, effusion or pulmonary edema. Further work up has been discussed with patient and his daughter Yumiko Gonzales. They prefer work up at [facility] and have declined ED visit for immediate evaluation. Daughter planned to discussed with patient's oncologist, Dr Parry   Plan: monitor respiratory staff. Send to [facility] ED with any acute respiratory symptoms .  Discussed with facility staff.     (R00.0) Sinus tachycardia  Comment: rate controlled   Plan: continue metoprolol. Monitor VS     (D64.9) Anemia, unspecified type  Comment: chronic. Hgb stable at 9.8. No s/s of active bleeding   Plan: follow Hgb     (R60.0) Bilateral leg edema  Comment: improved   Plan: continue lasix. Continue compression stockings     (R53.81) Physical deconditioning  Comment: reaching max potential in therapies. Disposition  unclear as his wife has dementia and is not able to assist with cares at home. Patient unable to perform  wound care himself due to the area of the wound.   Plan: continue PHYSICAL THERAPY/OT. Disposition discussed with nurse manager and .       Electronically signed by:  CRISELDA Cohen CNP               Sincerely,        CRISELDA Cohen CNP

## 2019-09-24 NOTE — PROGRESS NOTES
[place] [facility]  [place] [facility] Record Number:  [id number]  Place of Service where encounter took place:  [facility] [[id number]]  Chief Complaint   Patient presents with     RECHECK       HPI:    Shaun Patel  is a 83 year old (1935), who is being seen today for an episodic care visit.  HPI information obtained from: facility chart records, facility staff, patient report and [facility] chart review.  He initially came to this facility 8/6/2019 following hospitalization at [facility] with left groin abscess, s/p I&D 7/31/2019. He was treated with IV antibiotics and discharged on Augmentin. He had poor wound healing and was rehospitalized 8/28/2019 for I&D  by Dr Curtis. Cultures grew E coli, Proteus and Enterococcus. ID consulted and he was treated with broad spectrum antibiotics. Discharged on Cipro and Augmentin through 9/13/2019.  He was noted to have rising blood sugars and HgbA1c was 9.3. Basaglar  and Novolog were started for new diagnosis of diabetes type 2. Decadron dose was decreased.  He had rectal bleeding and Hgb dropped to the 9s. He declined GI work up, as he has done during previous hospitalizations. [facility] nurse consulted and recommended bid wound care with Vashe moistened gauze, covered with ABD. He returned to the facility 9/4/2019 for ongoing rehab and medical management.      Today's concerns are:      Abscess of groin, left-afebrile. Denies pain. He was seen in the [facility] 9/16/2019 with no change in wound care.   Cellulitis of other specified site  Type 2 diabetes mellitus with complication, unspecified whether long term insulin use-insulin has been gradually improved. Blood sugars in am: , lunch: 120-290, dinner: 238-241, HS: 217-323  He's trying to be more careful with his diet.   CLL (chronic lymphocytic leukemia) (H)  Astrocytoma (H)  Nodular sclerosing Hodgkin's lymphoma, unspecified body region (H)  Shortness of breath-reports this is  unchanged. No hypoxia. No cough or chest pain. Additional lasix 20 mg daily given 9/20 and 9/21/2019 with no change in his breathing.   Sinus tachycardia-HR: 80-98   BPs: 134/79, 125/76, 130/77   Anemia, unspecified type  Bilateral leg edema-no calf pain. Weight unchanged 210 lbs.   Physical deconditioning-ambulating 280 ft with walker and supervision. Requires stand by assist with cares.   SLUMS 22/30, CPT 5.2/5.6     Past Medical and Surgical History reviewed in Epic today.    MEDICATIONS:  Current Outpatient Medications   Medication Sig Dispense Refill     acetaminophen (TYLENOL) 325 MG tablet Take 650 mg by mouth every 4 hours as needed for fever or pain       dexamethasone (DECADRON) 2 MG tablet Take 2 mg by mouth 2 times daily (with meals)        FUROSEMIDE PO Take 20 mg by mouth every morning        insulin aspart (NOVOLOG PEN) 100 UNIT/ML pen Inject Subcutaneous At Bedtime Inject as per sliding scale: if 0 - 399 = 0 No insulin for BS < 400; 400+ = 4, subcutaneously at bedtime       insulin aspart (NOVOLOG PEN) 100 UNIT/ML pen Inject Subcutaneous 3 times daily (with meals) Give subcutaneous TID with meals per blood glucose (mg/dL). Don't give corrective dose for PRN, post-prandial or nocturnal glucose checks unless ordered.  Blood Glucose.....Dose   <70...............See Hypoglycemia Protocol   ..........No insulin, give prandial insulin, if ordered  150-199........1 unit  200-249........2 units  250-299........3 units  300-349........4 units  350-399........5 units  400 or greater....6 units & call MD        insulin glargine (BASAGLAR KWIKPEN) 100 UNIT/ML pen Inject 23 Units Subcutaneous every morning        insulin glargine (BASAGLAR KWIKPEN) 100 UNIT/ML pen Inject 17 Units Subcutaneous At Bedtime        metoprolol succinate ER (TOPROL-XL) 25 MG 24 hr tablet Take 12.5 mg by mouth daily        nystatin (MYCOSTATIN) 515235 UNIT/GM external powder Apply topically 2 times daily        pantoprazole  "(PROTONIX) 20 MG EC tablet Take 40 mg by mouth daily       potassium chloride ER (K-DUR/KLOR-CON M) 10 MEQ CR tablet Take 10 mEq by mouth daily       Skin Protectants, Misc. (EUCERIN) cream Apply topically as needed for dry skin Apply to bilateral feet and LE topically one time a day for dry skin and Apply to bilateral feet and LE topically as needed for dry skin         REVIEW OF SYSTEMS:  10 point ROS of systems including Constitutional, Eyes, Respiratory, Cardiovascular, Gastroenterology, Genitourinary, Integumentary, Musculoskeletal, Psychiatric were all negative except for pertinent positives noted in my HPI.    Objective:  /79   Pulse 80   Temp 97  F (36.1  C)   Resp 16   Ht 1.803 m (5' 11\")   Wt 95.3 kg (210 lb)   SpO2 93%   BMI 29.29 kg/m    Exam:  GENERAL APPEARANCE:  Alert, in no distress  ENT:  Mouth and posterior oropharynx normal, moist mucous membranes, [place]  EYES:  EOM normal, conjunctiva and lids normal  NECK:  No adenopathy,masses or thyromegaly  RESP:  respiratory effort and palpation of chest normal, lungs clear to auscultation , no respiratory distress  CV:  Palpation and auscultation of heart done , regular rate and rhythm, no  murmur, +2 pedal pulses, peripheral edema  trace  in both LE  ABDOMEN: soft, nontender, no  masses  M/S:   gait steady with walker.  MUHAMMAD with good strength. No joint inflammation  SKIN: left groin wound clean with granulation and small areas of slough, mild induration. Mild erythema of area under the tape, no other rashes or open areas  PSYCH:  oriented X 3, memory impaired , affect and mood normal    Labs:   Labs done in SNF are in [place] EPIC. Please refer to them using EPIC/Care Everywhere.    ASSESSMENT / PLAN:  (L02.214) Abscess of groin, left  (primary encounter diagnosis)  (L03.818) Cellulitis of other specified site  Comment: wound is healing well with no signs of infection. Skin irritation from tape. Completed Augmentin and Cipro 9/13/2019.   Plan: " continue wound care as ordered. Barrier cream to irritated skin and reassess in a few days. Urology follow up 9/30/2019.     (E11.8) Type 2 diabetes mellitus with complication, unspecified whether long term insulin use (H)  Comment: improved control, but still hyperglycemic later in the day .  Plan: increase am Basaglar to 23 units, continue 17 in pm. Monitor blood sugars qid. Ongoing patient education re: diet, checking blood sugars and self administering insulin.   Discussed with nurse manager.     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   Plan: per Oncology. Continue decadron.     (R06.02) Shortness of breath  Comment: SOB for over 3 months, no worsening of symptoms and no signs of acute process. CXR done 9/6/2019 showed chronic lung changes, no infiltrate, effusion or pulmonary edema. Further work up has been discussed with patient and his daughter Yumiko Gonzales. They prefer work up at [facility] and have declined ED visit for immediate evaluation. Daughter planned to discussed with patient's oncologist, Dr Parry   Plan: monitor respiratory staff. Send to [facility] ED with any acute respiratory symptoms .  Discussed with facility staff.     (R00.0) Sinus tachycardia  Comment: rate controlled   Plan: continue metoprolol. Monitor VS     (D64.9) Anemia, unspecified type  Comment: chronic. Hgb stable at 9.8. No s/s of active bleeding   Plan: follow Hgb     (R60.0) Bilateral leg edema  Comment: improved   Plan: continue lasix. Continue compression stockings     (R53.81) Physical deconditioning  Comment: reaching max potential in therapies. Disposition  unclear as his wife has dementia and is not able to assist with cares at home. Patient unable to perform wound care himself due to the area of the wound.   Plan: continue PHYSICAL THERAPY/OT.  Disposition discussed with nurse manager and .       Electronically signed by:  CRISELDA Cohen CNP

## 2019-09-27 ENCOUNTER — NURSING HOME VISIT (OUTPATIENT)
Dept: GERIATRICS | Facility: CLINIC | Age: 84
End: 2019-09-27
Payer: COMMERCIAL

## 2019-09-27 VITALS
OXYGEN SATURATION: 94 % | SYSTOLIC BLOOD PRESSURE: 149 MMHG | HEART RATE: 89 BPM | WEIGHT: 219.6 LBS | TEMPERATURE: 96.2 F | HEIGHT: 71 IN | DIASTOLIC BLOOD PRESSURE: 88 MMHG | RESPIRATION RATE: 18 BRPM | BODY MASS INDEX: 30.74 KG/M2

## 2019-09-27 DIAGNOSIS — C71.9 ASTROCYTOMA (H): ICD-10-CM

## 2019-09-27 DIAGNOSIS — R53.81 PHYSICAL DECONDITIONING: ICD-10-CM

## 2019-09-27 DIAGNOSIS — E11.8 TYPE 2 DIABETES MELLITUS WITH COMPLICATION, UNSPECIFIED WHETHER LONG TERM INSULIN USE: ICD-10-CM

## 2019-09-27 DIAGNOSIS — R60.0 BILATERAL LEG EDEMA: ICD-10-CM

## 2019-09-27 DIAGNOSIS — L02.214 ABSCESS OF GROIN, LEFT: Primary | ICD-10-CM

## 2019-09-27 DIAGNOSIS — C81.10 NODULAR SCLEROSING HODGKIN'S LYMPHOMA, UNSPECIFIED BODY REGION (H): ICD-10-CM

## 2019-09-27 DIAGNOSIS — D64.9 ANEMIA, UNSPECIFIED TYPE: ICD-10-CM

## 2019-09-27 DIAGNOSIS — R00.0 SINUS TACHYCARDIA: ICD-10-CM

## 2019-09-27 DIAGNOSIS — C91.10 CLL (CHRONIC LYMPHOCYTIC LEUKEMIA) (H): ICD-10-CM

## 2019-09-27 DIAGNOSIS — L03.818 CELLULITIS OF OTHER SPECIFIED SITE: ICD-10-CM

## 2019-09-27 DIAGNOSIS — R06.02 SHORTNESS OF BREATH: ICD-10-CM

## 2019-09-27 PROCEDURE — 99309 SBSQ NF CARE MODERATE MDM 30: CPT | Performed by: NURSE PRACTITIONER

## 2019-09-27 ASSESSMENT — MIFFLIN-ST. JEOR: SCORE: 1713.23

## 2019-09-27 NOTE — LETTER
9/27/2019        RE: Shaun Patel  [address]  [facility] [address]        [place] [facility]  [place] Medical Record Number:  [id number]  Place of Service where encounter took place:  [facility] [[id number]]  Chief Complaint   Patient presents with     RECHECK       HPI:    Shaun Patel  is a 83 year old (1935), who is being seen today for an episodic care visit.  HPI information obtained from: facility chart records, facility staff, patient report and [facility] chart review.   He initially came to this facility 8/6/2019 following hospitalization at [facility] with left groin abscess, s/p I&D 7/31/2019. He was treated with IV antibiotics and discharged on Augmentin. He had poor wound healing and was rehospitalized 8/28/2019 for I&D  by Dr Curtis. Cultures grew E coli, Proteus and Enterococcus. ID consulted and he was treated with broad spectrum antibiotics. Discharged on Cipro and Augmentin through 9/13/2019.  He was noted to have rising blood sugars and HgbA1c was 9.3. Basaglar  and Novolog were started for new diagnosis of diabetes type 2. Decadron dose was decreased.  He had rectal bleeding and Hgb dropped to the 9s. He declined GI work up, as he has done during previous hospitalizations. [facility] nurse consulted and recommended bid wound care with Vashe moistened gauze, covered with ABD. He returned to the facility 9/4/2019 for ongoing rehab and medical management.     Today's concerns are:      Abscess of groin, left  Cellulitis of other specified site-afebrile. Reports mild pain with wound care, otherwise comfortable.   Type 2 diabetes mellitus with complication, unspecified whether long term insulin use-insulin last increased 9/24/2019. Blood sugars in am: , outlier of 206 which was taken after he ate breakfast. Later in the day: 149-242   Good appetite. Has been more careful with his diet. Nurses continue to work with him on checking his blood sugars and self  administering insulin. He feels that he can manage the blood sugar checks but isn't ready for the injections   CLL (chronic lymphocytic leukemia) (H)  Astrocytoma (H)  Nodular sclerosing Hodgkin's lymphoma, unspecified body region (H)  Shortness of breath-reports this is unchanged. No cough or chest pain. Afebrile, no hypoxia.   Sinus tachycardia-HR:   BPs: 149/88, 137/88, 130/81   Anemia, unspecified type  Bilateral leg edema-no calf pain. Weight stable 210-211.   Physical deconditioning-he has completed therapies after reaching max potential. Ambulating 280 ft with walker and supervision. Requires stand by assist with cares.     Past Medical and Surgical History reviewed in Epic today.    MEDICATIONS:  Current Outpatient Medications   Medication Sig Dispense Refill     acetaminophen (TYLENOL) 325 MG tablet Take 650 mg by mouth every 4 hours as needed for fever or pain       dexamethasone (DECADRON) 2 MG tablet Take 2 mg by mouth 2 times daily (with meals)        FUROSEMIDE PO Take 20 mg by mouth every morning        insulin aspart (NOVOLOG PEN) 100 UNIT/ML pen Inject Subcutaneous At Bedtime Inject as per sliding scale: if 0 - 399 = 0 No insulin for BS < 400; 400+ = 4, subcutaneously at bedtime       insulin aspart (NOVOLOG PEN) 100 UNIT/ML pen Inject Subcutaneous 3 times daily (with meals) Give subcutaneous TID with meals per blood glucose (mg/dL). Don't give corrective dose for PRN, post-prandial or nocturnal glucose checks unless ordered.  Blood Glucose.....Dose   <70...............See Hypoglycemia Protocol   ..........No insulin, give prandial insulin, if ordered  150-199........1 unit  200-249........2 units  250-299........3 units  300-349........4 units  350-399........5 units  400 or greater....6 units & call MD        insulin glargine (BASAGLAR KWIKPEN) 100 UNIT/ML pen Inject 26 Units Subcutaneous every morning        insulin glargine (BASAGLAR KWIKPEN) 100 UNIT/ML pen Inject 17 Units  "Subcutaneous At Bedtime        metoprolol succinate ER (TOPROL-XL) 25 MG 24 hr tablet Take 12.5 mg by mouth daily        nystatin (MYCOSTATIN) 831852 UNIT/GM external powder Apply topically 2 times daily        pantoprazole (PROTONIX) 20 MG EC tablet Take 40 mg by mouth daily       potassium chloride ER (K-DUR/KLOR-CON M) 10 MEQ CR tablet Take 10 mEq by mouth daily       Skin Protectants, Misc. (EUCERIN) cream Apply topically as needed for dry skin Apply to bilateral feet and LE topically one time a day for dry skin and Apply to bilateral feet and LE topically as needed for dry skin         REVIEW OF SYSTEMS:  4 point ROS including Respiratory, CV, GI and , other than that noted in the HPI,  is negative    Objective:  BP (!) 149/88   Pulse 89   Temp 96.2  F (35.7  C)   Resp 18   Ht 1.803 m (5' 11\")   Wt 99.6 kg (219 lb 9.6 oz)   SpO2 94%   BMI 30.63 kg/m     Exam:  GENERAL APPEARANCE:  Alert, in no distress  ENT:  Mouth and posterior oropharynx normal, moist mucous membranes, [place]  EYES:  EOM normal, conjunctiva and lids normal  NECK:  No adenopathy,masses or thyromegaly  RESP:  respiratory effort and palpation of chest normal, lungs clear to auscultation , no respiratory distress  CV:  Palpation and auscultation of heart done , regular rate and rhythm, no  murmur, +2 pedal pulses, peripheral edema  trace  in both LE  ABDOMEN: soft, nontender, no  masses  M/S:   gait steady with walker.  MUHAMMAD with good strength. No joint inflammation  SKIN: left groin wound clean with granulation and small areas of slough, mild induration. Mild erythema of area under the tape, no other rashes or open areas  PSYCH:  oriented X 3, memory impaired , affect and mood normal    Labs:   Labs done in SNF are in [place] EPIC. Please refer to them using EPIC/Care Everywhere.    ASSESSMENT / PLAN:  (L02.214) Abscess of groin, left  (primary encounter diagnosis)  (L03.818) Cellulitis of other specified site  Comment: wound is healing " "well with no signs of infection. Completed Augmentin and Cipro 9/13/2019.   Plan: continue wound care as ordered. Urology follow up 9/30/2019.      (E11.8) Type 2 diabetes mellitus with complication, unspecified whether long term insulin use (H)  Comment: control continues to improve. Remains hyperglycemic later in the day.   Plan: increase am Basaglar to 26 units, continue 17 units in  pm. Monitor blood sugars qid. Ongoing patient education re: diet, checking blood sugars and self administering insulin.      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   Plan: per Oncology. Continue decadron.      (R06.02) Shortness of breath  Comment:  chronic with no worsening of symptoms and no signs of acute process. CXR done 9/6/2019 showed chronic lung changes, no infiltrate, effusion or pulmonary edema.  Daughter reports a recent \"normal\" cardiac work up. They prefer further work up at [facility] and have declined ED visit for immediate evaluation. They plan to follow up with Oncology.   Plan: monitor respiratory staff. Send to [facility] ED with any acute respiratory symptoms .     (R00.0) Sinus tachycardia  Comment: rate controlled   Plan: continue metoprolol. Monitor VS      (D64.9) Anemia, unspecified type  Comment: chronic.  No s/s of active bleeding   Plan: follow Hgb      (R60.0) Bilateral leg edema  Comment: improved   Plan: continue lasix. Continue compression stockings      (R53.81) Physical deconditioning  Comment:  completed therapies. Unfortunately, he has not returned to his previous functional status and continues to require assistance with ADLs.  His wife has dementia and is not able to assist with cares at home. Patient unable to perform wound care himself due to the area of the wound.   Plan:  family is looking for AL placement.   Discussed " with .      Electronically signed by:  CRISELDA Cohen CNP               Sincerely,        CRISELDA Cohen CNP

## 2019-09-27 NOTE — PROGRESS NOTES
[place] [facility]  [place] Medical Record Number:  [id number]  Place of Service where encounter took place:  [facility] [[id number]]  Chief Complaint   Patient presents with     RECHECK       HPI:    Shaun Patel  is a 83 year old (1935), who is being seen today for an episodic care visit.  HPI information obtained from: facility chart records, facility staff, patient report and [facility] chart review.   He initially came to this facility 8/6/2019 following hospitalization at [facility] with left groin abscess, s/p I&D 7/31/2019. He was treated with IV antibiotics and discharged on Augmentin. He had poor wound healing and was rehospitalized 8/28/2019 for I&D  by Dr Curtis. Cultures grew E coli, Proteus and Enterococcus. ID consulted and he was treated with broad spectrum antibiotics. Discharged on Cipro and Augmentin through 9/13/2019.  He was noted to have rising blood sugars and HgbA1c was 9.3. Basaglar  and Novolog were started for new diagnosis of diabetes type 2. Decadron dose was decreased.  He had rectal bleeding and Hgb dropped to the 9s. He declined GI work up, as he has done during previous hospitalizations. [facility] nurse consulted and recommended bid wound care with Vashe moistened gauze, covered with ABD. He returned to the facility 9/4/2019 for ongoing rehab and medical management.     Today's concerns are:      Abscess of groin, left  Cellulitis of other specified site-afebrile. Reports mild pain with wound care, otherwise comfortable.   Type 2 diabetes mellitus with complication, unspecified whether long term insulin use-insulin last increased 9/24/2019. Blood sugars in am: , outlier of 206 which was taken after he ate breakfast. Later in the day: 149-242   Good appetite. Has been more careful with his diet. Nurses continue to work with him on checking his blood sugars and self administering insulin. He feels that he can manage the blood sugar checks but isn't  ready for the injections   CLL (chronic lymphocytic leukemia) (H)  Astrocytoma (H)  Nodular sclerosing Hodgkin's lymphoma, unspecified body region (H)  Shortness of breath-reports this is unchanged. No cough or chest pain. Afebrile, no hypoxia.   Sinus tachycardia-HR:   BPs: 149/88, 137/88, 130/81   Anemia, unspecified type  Bilateral leg edema-no calf pain. Weight stable 210-211.   Physical deconditioning-he has completed therapies after reaching max potential. Ambulating 280 ft with walker and supervision. Requires stand by assist with cares.     Past Medical and Surgical History reviewed in Epic today.    MEDICATIONS:  Current Outpatient Medications   Medication Sig Dispense Refill     acetaminophen (TYLENOL) 325 MG tablet Take 650 mg by mouth every 4 hours as needed for fever or pain       dexamethasone (DECADRON) 2 MG tablet Take 2 mg by mouth 2 times daily (with meals)        FUROSEMIDE PO Take 20 mg by mouth every morning        insulin aspart (NOVOLOG PEN) 100 UNIT/ML pen Inject Subcutaneous At Bedtime Inject as per sliding scale: if 0 - 399 = 0 No insulin for BS < 400; 400+ = 4, subcutaneously at bedtime       insulin aspart (NOVOLOG PEN) 100 UNIT/ML pen Inject Subcutaneous 3 times daily (with meals) Give subcutaneous TID with meals per blood glucose (mg/dL). Don't give corrective dose for PRN, post-prandial or nocturnal glucose checks unless ordered.  Blood Glucose.....Dose   <70...............See Hypoglycemia Protocol   ..........No insulin, give prandial insulin, if ordered  150-199........1 unit  200-249........2 units  250-299........3 units  300-349........4 units  350-399........5 units  400 or greater....6 units & call MD        insulin glargine (BASAGLAR KWIKPEN) 100 UNIT/ML pen Inject 26 Units Subcutaneous every morning        insulin glargine (BASAGLAR KWIKPEN) 100 UNIT/ML pen Inject 17 Units Subcutaneous At Bedtime        metoprolol succinate ER (TOPROL-XL) 25 MG 24 hr tablet Take  "12.5 mg by mouth daily        nystatin (MYCOSTATIN) 641172 UNIT/GM external powder Apply topically 2 times daily        pantoprazole (PROTONIX) 20 MG EC tablet Take 40 mg by mouth daily       potassium chloride ER (K-DUR/KLOR-CON M) 10 MEQ CR tablet Take 10 mEq by mouth daily       Skin Protectants, Misc. (EUCERIN) cream Apply topically as needed for dry skin Apply to bilateral feet and LE topically one time a day for dry skin and Apply to bilateral feet and LE topically as needed for dry skin         REVIEW OF SYSTEMS:  4 point ROS including Respiratory, CV, GI and , other than that noted in the HPI,  is negative    Objective:  BP (!) 149/88   Pulse 89   Temp 96.2  F (35.7  C)   Resp 18   Ht 1.803 m (5' 11\")   Wt 99.6 kg (219 lb 9.6 oz)   SpO2 94%   BMI 30.63 kg/m    Exam:  GENERAL APPEARANCE:  Alert, in no distress  ENT:  Mouth and posterior oropharynx normal, moist mucous membranes, [place]  EYES:  EOM normal, conjunctiva and lids normal  NECK:  No adenopathy,masses or thyromegaly  RESP:  respiratory effort and palpation of chest normal, lungs clear to auscultation , no respiratory distress  CV:  Palpation and auscultation of heart done , regular rate and rhythm, no  murmur, +2 pedal pulses, peripheral edema  trace  in both LE  ABDOMEN: soft, nontender, no  masses  M/S:   gait steady with walker.  MUHAMMAD with good strength. No joint inflammation  SKIN: left groin wound clean with granulation and small areas of slough, mild induration. Mild erythema of area under the tape, no other rashes or open areas  PSYCH:  oriented X 3, memory impaired , affect and mood normal    Labs:   Labs done in SNF are in [place] EPIC. Please refer to them using EPIC/Care Everywhere.    ASSESSMENT / PLAN:  (L02.214) Abscess of groin, left  (primary encounter diagnosis)  (L03.818) Cellulitis of other specified site  Comment: wound is healing well with no signs of infection. Completed Augmentin and Cipro 9/13/2019.   Plan: continue " "wound care as ordered. Urology follow up 9/30/2019.      (E11.8) Type 2 diabetes mellitus with complication, unspecified whether long term insulin use (H)  Comment: control continues to improve. Remains hyperglycemic later in the day.   Plan: increase am Basaglar to 26 units, continue 17 units in  pm. Monitor blood sugars qid. Ongoing patient education re: diet, checking blood sugars and self administering insulin.      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   Plan: per Oncology. Continue decadron.      (R06.02) Shortness of breath  Comment: chronic with no worsening of symptoms and no signs of acute process. CXR done 9/6/2019 showed chronic lung changes, no infiltrate, effusion or pulmonary edema. Daughter reports a recent \"normal\" cardiac work up. They prefer further work up at [facility] and have declined ED visit for immediate evaluation. They plan to follow up with Oncology.   Plan: monitor respiratory staff. Send to [facility] ED with any acute respiratory symptoms .     (R00.0) Sinus tachycardia  Comment: rate controlled   Plan: continue metoprolol. Monitor VS      (D64.9) Anemia, unspecified type  Comment: chronic.  No s/s of active bleeding   Plan: follow Hgb      (R60.0) Bilateral leg edema  Comment: improved   Plan: continue lasix. Continue compression stockings      (R53.81) Physical deconditioning  Comment: completed therapies. Unfortunately, he has not returned to his previous functional status and continues to require assistance with ADLs.  His wife has dementia and is not able to assist with cares at home. Patient unable to perform wound care himself due to the area of the wound.   Plan: family is looking for AL placement.   Discussed with .      Electronically signed by:  CRISELDA Cohen CNP           "

## 2019-10-01 ENCOUNTER — NURSING HOME VISIT (OUTPATIENT)
Dept: GERIATRICS | Facility: CLINIC | Age: 84
End: 2019-10-01
Payer: COMMERCIAL

## 2019-10-01 VITALS
DIASTOLIC BLOOD PRESSURE: 80 MMHG | WEIGHT: 219.6 LBS | RESPIRATION RATE: 16 BRPM | HEIGHT: 71 IN | OXYGEN SATURATION: 95 % | HEART RATE: 78 BPM | SYSTOLIC BLOOD PRESSURE: 144 MMHG | BODY MASS INDEX: 30.74 KG/M2 | TEMPERATURE: 97.2 F

## 2019-10-01 DIAGNOSIS — R53.81 PHYSICAL DECONDITIONING: ICD-10-CM

## 2019-10-01 DIAGNOSIS — C91.10 CLL (CHRONIC LYMPHOCYTIC LEUKEMIA) (H): ICD-10-CM

## 2019-10-01 DIAGNOSIS — R06.02 SHORTNESS OF BREATH: ICD-10-CM

## 2019-10-01 DIAGNOSIS — C81.10 NODULAR SCLEROSING HODGKIN'S LYMPHOMA, UNSPECIFIED BODY REGION (H): ICD-10-CM

## 2019-10-01 DIAGNOSIS — R60.0 BILATERAL LEG EDEMA: ICD-10-CM

## 2019-10-01 DIAGNOSIS — Z79.4 TYPE 2 DIABETES MELLITUS WITH COMPLICATION, WITH LONG-TERM CURRENT USE OF INSULIN (H): ICD-10-CM

## 2019-10-01 DIAGNOSIS — R00.0 SINUS TACHYCARDIA: ICD-10-CM

## 2019-10-01 DIAGNOSIS — D64.9 ANEMIA, UNSPECIFIED TYPE: ICD-10-CM

## 2019-10-01 DIAGNOSIS — L03.818 CELLULITIS OF OTHER SPECIFIED SITE: ICD-10-CM

## 2019-10-01 DIAGNOSIS — E11.8 TYPE 2 DIABETES MELLITUS WITH COMPLICATION, WITH LONG-TERM CURRENT USE OF INSULIN (H): ICD-10-CM

## 2019-10-01 DIAGNOSIS — L02.214 ABSCESS OF GROIN, LEFT: Primary | ICD-10-CM

## 2019-10-01 DIAGNOSIS — C71.9 ASTROCYTOMA (H): ICD-10-CM

## 2019-10-01 PROCEDURE — 99309 SBSQ NF CARE MODERATE MDM 30: CPT | Performed by: NURSE PRACTITIONER

## 2019-10-01 ASSESSMENT — MIFFLIN-ST. JEOR: SCORE: 1713.23

## 2019-10-01 NOTE — PROGRESS NOTES
[place] [facility]  [place] Medical Record Number:  [id number]  Place of Service where encounter took place:   [facility] [[id number]]  Chief Complaint   Patient presents with     RECHECK       HPI:    Shaun Patel  is a 83 year old (1935), who is being seen today for an episodic care visit.  HPI information obtained from: facility chart records, facility staff, patient report and [facility] chart review.  He initially came to this facility 8/6/2019 following hospitalization at [facility] with left groin abscess, s/p I&D 7/31/2019. He was treated with IV antibiotics and discharged on Augmentin. He had poor wound healing and was rehospitalized 8/28/2019 for I&D  by Dr Curtis. Cultures grew E coli, Proteus and Enterococcus. ID consulted and he was treated with broad spectrum antibiotics. Discharged on Cipro and Augmentin through 9/13/2019.  He was noted to have rising blood sugars and HgbA1c was 9.3. Basaglar  and Novolog were started for new diagnosis of diabetes type 2. Decadron dose was decreased.  He had rectal bleeding and Hgb dropped to the 9s. He declined GI work up, as he has done during previous hospitalizations. [facility] nurse consulted and recommended bid wound care with Vashe moistened gauze, covered with ABD. He returned to the facility 9/4/2019 for ongoing rehab and medical management.        Today's concern is:     Abscess of groin, left  Cellulitis of other specified site-saw Urology today with no change in orders. Feeling well. Minimal pain during wound care. Afebrile.   Type 2 diabetes mellitus with complication, with long-term current use of insulin (H-insulin has been gradually increased since tcu admission. Am blood sugars: , later in the day: 173-231, outlier 352. He admits to eating desserts with meals.   CLL (chronic lymphocytic leukemia) (H)  Astrocytoma (H)  Nodular sclerosing Hodgkin's lymphoma, unspecified body region (H)  Shortness of breath-reports this is  unchanged. No cough or chest pain   Sinus tachycardia-HR: 78-88   BPs: 144/80, 145/81, 114/69 , 126/80   Anemia, unspecified type  Bilateral leg edema  Physical deconditioning-he has completed therapies. Ambulating 280 ft with walker and supervision. Requires stand by to min assist with transfers and cares.     Past Medical and Surgical History reviewed in Epic today.    MEDICATIONS:  Current Outpatient Medications   Medication Sig Dispense Refill     acetaminophen (TYLENOL) 325 MG tablet Take 650 mg by mouth every 4 hours as needed for fever or pain       dexamethasone (DECADRON) 2 MG tablet Take 2 mg by mouth 2 times daily (with meals)        FUROSEMIDE PO Take 20 mg by mouth every morning        insulin aspart (NOVOLOG PEN) 100 UNIT/ML pen Inject Subcutaneous At Bedtime Inject as per sliding scale: if 0 - 399 = 0 No insulin for BS < 400; 400+ = 4, subcutaneously at bedtime       insulin aspart (NOVOLOG PEN) 100 UNIT/ML pen Inject Subcutaneous 3 times daily (with meals) Give subcutaneous TID with meals per blood glucose (mg/dL). Don't give corrective dose for PRN, post-prandial or nocturnal glucose checks unless ordered.  Blood Glucose.....Dose   <70...............See Hypoglycemia Protocol   ..........No insulin, give prandial insulin, if ordered  150-199........1 unit  200-249........2 units  250-299........3 units  300-349........4 units  350-399........5 units  400 or greater....6 units & call MD        insulin glargine (BASAGLAR KWIKPEN) 100 UNIT/ML pen Inject 26 Units Subcutaneous every morning        insulin glargine (BASAGLAR KWIKPEN) 100 UNIT/ML pen Inject 17 Units Subcutaneous At Bedtime        metoprolol succinate ER (TOPROL-XL) 25 MG 24 hr tablet Take 12.5 mg by mouth daily        nystatin (MYCOSTATIN) 845107 UNIT/GM external powder Apply topically 2 times daily        pantoprazole (PROTONIX) 20 MG EC tablet Take 40 mg by mouth daily       potassium chloride ER (K-DUR/KLOR-CON M) 10 MEQ CR tablet  "Take 10 mEq by mouth daily       Skin Protectants, Misc. (EUCERIN) cream Apply topically as needed for dry skin Apply to bilateral feet and LE topically one time a day for dry skin and Apply to bilateral feet and LE topically as needed for dry skin           REVIEW OF SYSTEMS:  4 point ROS including Respiratory, CV, GI and , other than that noted in the HPI,  is negative    Objective:  BP (!) 144/80   Pulse 78   Temp 97.2  F (36.2  C)   Resp 16   Ht 1.803 m (5' 11\")   Wt 99.6 kg (219 lb 9.6 oz)   SpO2 95%   BMI 30.63 kg/m    Exam:  GENERAL APPEARANCE:  Alert, in no distress  ENT:  Mouth and posterior oropharynx normal, moist mucous membranes, [place]  EYES:  EOM normal, conjunctiva and lids normal  NECK:  No adenopathy,masses or thyromegaly  RESP:  respiratory effort and palpation of chest normal, lungs clear to auscultation , no respiratory distress  CV:  Palpation and auscultation of heart done , regular rate and rhythm, no  murmur, +2 pedal pulses, peripheral edema  trace  in both LE  ABDOMEN: soft, nontender, no  masses  M/S:   gait steady with walker.  MUHAMMAD with good strength. No joint inflammation  SKIN: left groin wound clean with granulation and small areas of slough, mild induration. Mild erythema of area under the tape, no other rashes or open areas  PSYCH:  oriented X 3, memory impaired , affect and mood normal    Labs:   Labs done in SNF are in [place] EPIC. Please refer to them using EPIC/Care Everywhere.    ASSESSMENT/PLAN:  (L02.214) Abscess of groin, left  (primary encounter diagnosis)  (L03.818) Cellulitis of other specified site  Comment: wound is healing well with no signs of infection. Completed Augmentin and Cipro 9/13/2019.   Plan: continue wound care as ordered. Urology and [facility] follow up as scheduled.      (E11.8) Type 2 diabetes mellitus with complication, unspecified whether long term insulin use (H)  Comment: control is improving. He's slowly learning to check blood sugars " "and administer insulin, although it's unlikely he will be able to manage this independently.   Plan: increase am Basaglar to 28 units, continue 17 units in  pm. Monitor blood sugars qid. Ongoing patient education re: diet, checking blood sugars and self administering insulin.      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   Plan: per Oncology. Continue decadron.      (R06.02) Shortness of breath  Comment: chronic with no worsening of symptoms and no signs of acute process. CXR done 9/6/2019 showed chronic lung changes, no infiltrate, effusion or pulmonary edema. Daughter reports a recent \"normal\" cardiac work up. They prefer further work up at [facility] and plan to follow up with Oncology.   Plan: monitor respiratory staff.      (R00.0) Sinus tachycardia  Comment: rate controlled   Plan: continue metoprolol. Monitor VS      (D64.9) Anemia, unspecified type  Comment: chronic, Hgb stable at 9.8.  No s/s of active bleeding   Plan: follow Hgb      (R60.0) Bilateral leg edema  Comment: improved. Renal function is at baseline.   Plan: continue lasix. Continue compression stockings      (R53.81) Physical deconditioning  Comment: completed therapies. Unfortunately, he has not returned to his previous functional status and continues to require assistance with ADLs.  His wife is unable  to assist with his cares at home. Patient unable to perform wound care himself due to the area of the wound.   Plan: family is looking for AL placement. Walking and exercise program.       Electronically signed by:  CRISELDA Cohen CNP           "

## 2019-10-01 NOTE — LETTER
10/1/2019        RE: Shaun Patel  [address]  [facility] [address]        [place] [facility]  [place] [facility] Record Number:  [id number]  Place of Service where encounter took place:  [facility] [[id number]]  Chief Complaint   Patient presents with     RECHECK       HPI:    Shaun Patel  is a 83 year old (1935), who is being seen today for an episodic care visit.  HPI information obtained from: facility chart records, facility staff, patient report and [facility] chart review.  He initially came to this facility 8/6/2019 following hospitalization at [facility] with left groin abscess, s/p I&D 7/31/2019. He was treated with IV antibiotics and discharged on Augmentin. He had poor wound healing and was rehospitalized 8/28/2019 for I&D  by Dr Curtis. Cultures grew E coli, Proteus and Enterococcus. ID consulted and he was treated with broad spectrum antibiotics. Discharged on Cipro and Augmentin through 9/13/2019.  He was noted to have rising blood sugars and HgbA1c was 9.3. Basaglar  and Novolog were started for new diagnosis of diabetes type 2. Decadron dose was decreased.  He had rectal bleeding and Hgb dropped to the 9s. He declined GI work up, as he has done during previous hospitalizations. [facility] nurse consulted and recommended bid wound care with Vashe moistened gauze, covered with ABD. He returned to the facility 9/4/2019 for ongoing rehab and medical management.        Today's concern is:     Abscess of groin, left  Cellulitis of other specified site-saw Urology today with no change in orders. Feeling well. Minimal pain during wound care. Afebrile.   Type 2 diabetes mellitus with complication, with long-term current use of insulin (H-insulin has been gradually increased since tcu admission. Am blood sugars: , later in the day: 173-231, outlier 352. He admits to eating desserts with meals.   CLL (chronic lymphocytic leukemia) (H)  Astrocytoma (H)  Nodular  sclerosing Hodgkin's lymphoma, unspecified body region (H)  Shortness of breath-reports this is unchanged. No cough or chest pain   Sinus tachycardia-HR: 78-88   BPs: 144/80, 145/81, 114/69 , 126/80   Anemia, unspecified type  Bilateral leg edema  Physical deconditioning-he has completed therapies. Ambulating 280 ft with walker and supervision. Requires stand by to min assist with transfers and cares.     Past Medical and Surgical History reviewed in Epic today.    MEDICATIONS:  Current Outpatient Medications   Medication Sig Dispense Refill     acetaminophen (TYLENOL) 325 MG tablet Take 650 mg by mouth every 4 hours as needed for fever or pain       dexamethasone (DECADRON) 2 MG tablet Take 2 mg by mouth 2 times daily (with meals)        FUROSEMIDE PO Take 20 mg by mouth every morning        insulin aspart (NOVOLOG PEN) 100 UNIT/ML pen Inject Subcutaneous At Bedtime Inject as per sliding scale: if 0 - 399 = 0 No insulin for BS < 400; 400+ = 4, subcutaneously at bedtime       insulin aspart (NOVOLOG PEN) 100 UNIT/ML pen Inject Subcutaneous 3 times daily (with meals) Give subcutaneous TID with meals per blood glucose (mg/dL). Don't give corrective dose for PRN, post-prandial or nocturnal glucose checks unless ordered.  Blood Glucose.....Dose   <70...............See Hypoglycemia Protocol   ..........No insulin, give prandial insulin, if ordered  150-199........1 unit  200-249........2 units  250-299........3 units  300-349........4 units  350-399........5 units  400 or greater....6 units & call MD        insulin glargine (BASAGLAR KWIKPEN) 100 UNIT/ML pen Inject 26 Units Subcutaneous every morning        insulin glargine (BASAGLAR KWIKPEN) 100 UNIT/ML pen Inject 17 Units Subcutaneous At Bedtime        metoprolol succinate ER (TOPROL-XL) 25 MG 24 hr tablet Take 12.5 mg by mouth daily        nystatin (MYCOSTATIN) 768558 UNIT/GM external powder Apply topically 2 times daily        pantoprazole (PROTONIX) 20 MG EC  "tablet Take 40 mg by mouth daily       potassium chloride ER (K-DUR/KLOR-CON M) 10 MEQ CR tablet Take 10 mEq by mouth daily       Skin Protectants, Misc. (EUCERIN) cream Apply topically as needed for dry skin Apply to bilateral feet and LE topically one time a day for dry skin and Apply to bilateral feet and LE topically as needed for dry skin           REVIEW OF SYSTEMS:  4 point ROS including Respiratory, CV, GI and , other than that noted in the HPI,  is negative    Objective:  BP (!) 144/80   Pulse 78   Temp 97.2  F (36.2  C)   Resp 16   Ht 1.803 m (5' 11\")   Wt 99.6 kg (219 lb 9.6 oz)   SpO2 95%   BMI 30.63 kg/m     Exam:  GENERAL APPEARANCE:  Alert, in no distress  ENT:  Mouth and posterior oropharynx normal, moist mucous membranes, [place]  EYES:  EOM normal, conjunctiva and lids normal  NECK:  No adenopathy,masses or thyromegaly  RESP:  respiratory effort and palpation of chest normal, lungs clear to auscultation , no respiratory distress  CV:  Palpation and auscultation of heart done , regular rate and rhythm, no  murmur, +2 pedal pulses, peripheral edema  trace  in both LE  ABDOMEN: soft, nontender, no  masses  M/S:   gait steady with walker.  [place] with good strength. No joint inflammation  SKIN: left groin wound clean with granulation and small areas of slough, mild induration. Mild erythema of area under the tape, no other rashes or open areas  PSYCH:  oriented X 3, memory impaired , affect and mood normal    Labs:   Labs done in SNF are in [place] EPIC. Please refer to them using EPIC/Care Everywhere.    ASSESSMENT/PLAN:  (L02.214) Abscess of groin, left  (primary encounter diagnosis)  (L03.818) Cellulitis of other specified site  Comment: wound is healing well with no signs of infection. Completed Augmentin and Cipro 9/13/2019.   Plan: continue wound care as ordered. [facility]rology and [facility] follow up as scheduled.      (E11.8) Type 2 diabetes mellitus with complication, unspecified whether " "long term insulin use (H)  Comment:  control is improving. He's slowly learning to check blood sugars and administer insulin, although it's unlikely he will be able to manage this independently.   Plan: increase am Basaglar to 28 units, continue 17 units in  pm. Monitor blood sugars qid. Ongoing patient education re: diet, checking blood sugars and self administering insulin.      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   Plan: per Oncology. Continue decadron.      (R06.02) Shortness of breath  Comment: chronic with no worsening of symptoms and no signs of acute process. CXR done 9/6/2019 showed chronic lung changes, no infiltrate, effusion or pulmonary edema. Daughter reports a recent \"normal\" cardiac work up. They prefer further work up at [facility] and plan to follow up with Oncology.   Plan: monitor respiratory staff.      (R00.0) Sinus tachycardia  Comment: rate controlled   Plan: continue metoprolol. Monitor VS      (D64.9) Anemia, unspecified type  Comment: chronic, Hgb stable at 9.8.  No s/s of active bleeding   Plan: follow Hgb      (R60.0) Bilateral leg edema  Comment: improved. Renal function is at baseline.   Plan: continue lasix. Continue compression stockings      (R53.81) Physical deconditioning  Comment: completed therapies. Unfortunately, he has not returned to his previous functional status and continues to require assistance with ADLs.  His wife is  unable  to assist with his cares at home. Patient unable to perform wound care himself due to the area of the wound.   Plan: family is looking for AL placement. Walking and exercise program.       Electronically signed by:  CRISELDA Cohen CNP               Sincerely,        CRISELDA Cohen CNP    "

## 2019-10-05 ENCOUNTER — TELEPHONE (OUTPATIENT)
Dept: GERIATRICS | Facility: CLINIC | Age: 84
End: 2019-10-05

## 2019-10-05 NOTE — TELEPHONE ENCOUNTER
Patient with ongoing abscess of groin, now has another new abscess on right side of scrotum. No fevers. Patient sees urology. Completed antibiotics several weeks ago.     PLAN  Start Augmentin 875 mg PO BID x 7 days. Monitor skin - wound care if area opens. Update urology to new finding. CBC with diff stat today.     Electronically signed by CRISELDA Rodriguez, GNP

## 2019-10-08 ENCOUNTER — NURSING HOME VISIT (OUTPATIENT)
Dept: GERIATRICS | Facility: CLINIC | Age: 84
End: 2019-10-08

## 2019-10-08 VITALS
HEART RATE: 96 BPM | RESPIRATION RATE: 18 BRPM | HEIGHT: 71 IN | WEIGHT: 212.8 LBS | BODY MASS INDEX: 29.79 KG/M2 | OXYGEN SATURATION: 97 % | DIASTOLIC BLOOD PRESSURE: 81 MMHG | TEMPERATURE: 96.7 F | SYSTOLIC BLOOD PRESSURE: 134 MMHG

## 2019-10-08 DIAGNOSIS — D64.9 ANEMIA, UNSPECIFIED TYPE: ICD-10-CM

## 2019-10-08 DIAGNOSIS — C91.10 CLL (CHRONIC LYMPHOCYTIC LEUKEMIA) (H): ICD-10-CM

## 2019-10-08 DIAGNOSIS — R06.02 SHORTNESS OF BREATH: ICD-10-CM

## 2019-10-08 DIAGNOSIS — C71.9 ASTROCYTOMA (H): ICD-10-CM

## 2019-10-08 DIAGNOSIS — R00.0 SINUS TACHYCARDIA: ICD-10-CM

## 2019-10-08 DIAGNOSIS — R60.0 BILATERAL LEG EDEMA: ICD-10-CM

## 2019-10-08 DIAGNOSIS — R53.81 PHYSICAL DECONDITIONING: ICD-10-CM

## 2019-10-08 DIAGNOSIS — C81.10 NODULAR SCLEROSING HODGKIN'S LYMPHOMA, UNSPECIFIED BODY REGION (H): ICD-10-CM

## 2019-10-08 DIAGNOSIS — L03.818 CELLULITIS OF OTHER SPECIFIED SITE: ICD-10-CM

## 2019-10-08 DIAGNOSIS — Z79.4 TYPE 2 DIABETES MELLITUS WITH COMPLICATION, WITH LONG-TERM CURRENT USE OF INSULIN (H): ICD-10-CM

## 2019-10-08 DIAGNOSIS — L02.214 ABSCESS OF GROIN, LEFT: Primary | ICD-10-CM

## 2019-10-08 DIAGNOSIS — E11.8 TYPE 2 DIABETES MELLITUS WITH COMPLICATION, WITH LONG-TERM CURRENT USE OF INSULIN (H): ICD-10-CM

## 2019-10-08 PROCEDURE — 99309 SBSQ NF CARE MODERATE MDM 30: CPT | Performed by: NURSE PRACTITIONER

## 2019-10-08 ASSESSMENT — MIFFLIN-ST. JEOR: SCORE: 1682.38

## 2019-10-08 NOTE — LETTER
10/8/2019        RE: Shaun Patel  [address]  [facility] [address]        [place] [facility]  [place] [facility] Record Number:  [id number]  Place of Service where encounter took place:  [facility] [[id number]]  Chief Complaint   Patient presents with     RECHECK       HPI:    Shaun Patel  is a 83 year old (1935), who is being seen today for an episodic care visit.  HPI information obtained from: facility chart records, facility staff, patient report and [facility] chart review.   He initially came to this facility 8/6/2019 following hospitalization at [facility] with left groin abscess, s/p I&D 7/31/2019. He was treated with IV antibiotics and discharged on Augmentin. He had poor wound healing and was rehospitalized 8/28/2019 for I&D  by Dr Curtis. Cultures grew E coli, Proteus and Enterococcus. ID consulted and he was treated with broad spectrum antibiotics. Discharged on Cipro and Augmentin through 9/13/2019.  He was noted to have rising blood sugars and HgbA1c was 9.3. Basaglar  and Novolog were started for new diagnosis of diabetes type 2. Decadron dose was decreased.  He had rectal bleeding and Hgb dropped to the 9s. He declined GI work up, as he has done during previous hospitalizations. [facility] nurse consulted and recommended bid wound care with Vashe moistened gauze, covered with ABD. He returned to the facility 9/4/2019 for ongoing rehab and medical management.     Today's concern is:     Abscess of groin, left-afebrile and denies pain. There was concern over the weekend that he was developing an abscess of his right groin,which now sounds like it may have been an irritated hair follicle. Augmentin was started by the on call provider.   Cellulitis of other specified site  Type 2 diabetes mellitus with complication, with long-term current use of insulin (H)-blood sugars have been low in the am with recent readings in the 60s. Later in the day: 101-226  Good appetite  and admits he isn't careful with his diet.   CLL (chronic lymphocytic leukemia) (H)  Astrocytoma (H)  Nodular sclerosing Hodgkin's lymphoma, unspecified body region (H)  Shortness of breath-mild shortness of breath with exertion and quickly recovers. No cough or chest pain.   Sinus tachycardia-HR:  79-95   Anemia, unspecified type  Bilateral leg edema  Physical deconditioning-he has finished therapies and remains at the facility for wound care. Ambulating 280 ft with walker and supervision. Requires stand by to min assist with cares.     Past Medical and Surgical History reviewed in Epic today.    MEDICATIONS:  Current Outpatient Medications   Medication Sig Dispense Refill     acetaminophen (TYLENOL) 325 MG tablet Take 650 mg by mouth every 4 hours as needed for fever or pain       dexamethasone (DECADRON) 2 MG tablet Take 2 mg by mouth 2 times daily (with meals)        FUROSEMIDE PO Take 20 mg by mouth every morning        insulin aspart (NOVOLOG PEN) 100 UNIT/ML pen Inject Subcutaneous At Bedtime Inject as per sliding scale: if 0 - 399 = 0 No insulin for BS < 400; 400+ = 4, subcutaneously at bedtime       insulin aspart (NOVOLOG PEN) 100 UNIT/ML pen Inject Subcutaneous 3 times daily (with meals) Give subcutaneous TID with meals per blood glucose (mg/dL). Don't give corrective dose for PRN, post-prandial or nocturnal glucose checks unless ordered.  Blood Glucose.....Dose   <70...............See Hypoglycemia Protocol   ..........No insulin, give prandial insulin, if ordered  150-199........1 unit  200-249........2 units  250-299........3 units  300-349........4 units  350-399........5 units  400 or greater....6 units & call MD        insulin glargine (BASAGLAR KWIKPEN) 100 UNIT/ML pen Inject 31 Units Subcutaneous every morning        insulin glargine (BASAGLAR KWIKPEN) 100 UNIT/ML pen Inject 15 Units Subcutaneous At Bedtime        metoprolol succinate ER (TOPROL-XL) 25 MG 24 hr tablet Take 12.5 mg by mouth  "daily        nystatin (MYCOSTATIN) 514706 UNIT/GM external powder Apply topically 2 times daily        pantoprazole (PROTONIX) 20 MG EC tablet Take 40 mg by mouth daily       potassium chloride ER (K-DUR/KLOR-CON M) 10 MEQ CR tablet Take 10 mEq by mouth daily       Skin Protectants, Misc. (EUCERIN) cream Apply topically as needed for dry skin Apply to bilateral feet and LE topically one time a day for dry skin and Apply to bilateral feet and LE topically as needed for dry skin         REVIEW OF SYSTEMS:  4 point ROS including Respiratory, CV, GI and , other than that noted in the HPI,  is negative    Objective:  /81   Pulse 96   Temp 96.7  F (35.9  C)   Resp 18   Ht 1.803 m (5' 11\")   Wt 96.5 kg (212 lb 12.8 oz)   SpO2 97%   BMI 29.68 kg/m     Exam:  GENERAL APPEARANCE:  Alert, in no distress  ENT:  Mouth and posterior oropharynx normal, moist mucous membranes, [place]  EYES:  EOM normal, conjunctiva and lids normal  NECK:  No adenopathy,masses or thyromegaly  RESP:  respiratory effort and palpation of chest normal, lungs clear to auscultation , no respiratory distress  CV:  Palpation and auscultation of heart done , regular rate and rhythm, no  murmur, +2 pedal pulses, peripheral edema  trace  in both LE  ABDOMEN: soft, nontender, no  masses  M/S:   gait steady with walker.  [place] with good strength. No joint inflammation  SKIN: left groin wound clean with granulation and small areas of slough, mild induration. No rashes or skin irritations of right groin.   PSYCH:  oriented X 3, memory impaired , affect and mood normal    Labs:   Labs done in SNF are in [place] EPIC. Please refer to them using EPIC/Care Everywhere.    ASSESSMENT/PLAN:  (L02.214) Abscess of groin, left  (primary encounter diagnosis)  (L03.818) Cellulitis of other specified site  Comment: wound is healing well with no signs of infection. Completed Augmentin and Cipro 9/13/2019.   Augmentin restarted per on call provider 10/5 due to " "concerns about a right groin abscess. There is no sign of infection in the right groin and it may have been an infected or irritated hair follicle.   Plan: discontinue Augmentin 10/9/2019. Continue wound care as ordered. [facility]rology and [facility] follow up as scheduled.      (E11.8) Type 2 diabetes mellitus with complication, unspecified whether long term insulin use (H)  Comment: improved control, now borderline hypoglycemic in the am. He is slowly learning how to check blood sugars and self admin insulin.   Plan: continue am Basaglar to 28 units, decrease to 15  units in  pm. Monitor blood sugars qid. Ongoing patient education re: diet, checking blood sugars and self administering insulin.      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   Plan: follow up with  Oncology. Continue decadron.      (R06.02) Shortness of breath  Comment: chronic and unchanged.No fever, cough or signs of infection.  CXR done 9/6/2019 showed chronic lung changes, no infiltrate, effusion or pulmonary edema. Daughter reports a recent \"normal\" cardiac work up.   Plan: monitor respiratory staff. Daughter has made arrangements for follow up scans with Dr Parry, his oncologist.      (R00.0) Sinus tachycardia  Comment: rate controlled   Plan: continue metoprolol. Monitor VS      (D64.9) Anemia, unspecified type  Comment: chronic, Hgb stable at 9.8.  No s/s of active bleeding   Plan: follow Hgb      (R60.0) Bilateral leg edema  Comment: improved. Renal function is at baseline.   Plan: continue lasix. Continue compression stockings      (R53.81) Physical deconditioning  Comment: completed therapies, but has not returned to his previous functional level. His wife is unable  to assist with his cares at home. Patient unable to perform wound care himself due to the " area of the wound.   Plan: family is looking for AL placement.Encourage walking and exercise program.         Electronically signed by:  CRISELDA Cohen CNP               Sincerely,        CRISELDA Cohen CNP

## 2019-10-08 NOTE — PROGRESS NOTES
[facility]  [facility] Record Number:  [id number]  Place of Service where encounter took place:  [facility] [[id number]]  Chief Complaint   Patient presents with     RECHECK       HPI:    Shaun Patel  is a 83 year old (1935), who is being seen today for an episodic care visit.  HPI information obtained from: facility chart records, facility staff, patient report and [facility] chart review.   He initially came to this facility 8/6/2019 following hospitalization at [facility] with left groin abscess, s/p I&D 7/31/2019. He was treated with IV antibiotics and discharged on Augmentin. He had poor wound healing and was rehospitalized 8/28/2019 for I&D  by Dr Curtis. Cultures grew E coli, Proteus and Enterococcus. ID consulted and he was treated with broad spectrum antibiotics. Discharged on Cipro and Augmentin through 9/13/2019.  He was noted to have rising blood sugars and HgbA1c was 9.3. Basaglar  and Novolog were started for new diagnosis of diabetes type 2. Decadron dose was decreased.  He had rectal bleeding and Hgb dropped to the 9s. He declined GI work up, as he has done during previous hospitalizations. [facility] nurse consulted and recommended bid wound care with Vashe moistened gauze, covered with ABD. He returned to the facility 9/4/2019 for ongoing rehab and medical management.     Today's concern is:     Abscess of groin, left-afebrile and denies pain. There was concern over the weekend that he was developing an abscess of his right groin,which now sounds like it may have been an irritated hair follicle. Augmentin was started by the on call provider.   Cellulitis of other specified site  Type 2 diabetes mellitus with complication, with long-term current use of insulin (H)-blood sugars have been low in the am with recent readings in the 60s. Later in the day: 101-226  Good appetite and admits he isn't careful with his diet.   CLL (chronic lymphocytic leukemia)  (H)  Astrocytoma (H)  Nodular sclerosing Hodgkin's lymphoma, unspecified body region (H)  Shortness of breath-mild shortness of breath with exertion and quickly recovers. No cough or chest pain.   Sinus tachycardia-HR:  79-95   Anemia, unspecified type  Bilateral leg edema  Physical deconditioning-he has finished therapies and remains at the facility for wound care. Ambulating 280 ft with walker and supervision. Requires stand by to min assist with cares.     Past Medical and Surgical History reviewed in Epic today.    MEDICATIONS:  Current Outpatient Medications   Medication Sig Dispense Refill     acetaminophen (TYLENOL) 325 MG tablet Take 650 mg by mouth every 4 hours as needed for fever or pain       dexamethasone (DECADRON) 2 MG tablet Take 2 mg by mouth 2 times daily (with meals)        FUROSEMIDE PO Take 20 mg by mouth every morning        insulin aspart (NOVOLOG PEN) 100 UNIT/ML pen Inject Subcutaneous At Bedtime Inject as per sliding scale: if 0 - 399 = 0 No insulin for BS < 400; 400+ = 4, subcutaneously at bedtime       insulin aspart (NOVOLOG PEN) 100 UNIT/ML pen Inject Subcutaneous 3 times daily (with meals) Give subcutaneous TID with meals per blood glucose (mg/dL). Don't give corrective dose for PRN, post-prandial or nocturnal glucose checks unless ordered.  Blood Glucose.....Dose   <70...............See Hypoglycemia Protocol   ..........No insulin, give prandial insulin, if ordered  150-199........1 unit  200-249........2 units  250-299........3 units  300-349........4 units  350-399........5 units  400 or greater....6 units & call MD        insulin glargine (BASAGLAR KWIKPEN) 100 UNIT/ML pen Inject 31 Units Subcutaneous every morning        insulin glargine (BASAGLAR KWIKPEN) 100 UNIT/ML pen Inject 15 Units Subcutaneous At Bedtime        metoprolol succinate ER (TOPROL-XL) 25 MG 24 hr tablet Take 12.5 mg by mouth daily        nystatin (MYCOSTATIN) 432379 UNIT/GM external powder Apply topically  "2 times daily        pantoprazole (PROTONIX) 20 MG EC tablet Take 40 mg by mouth daily       potassium chloride ER (K-DUR/KLOR-CON M) 10 MEQ CR tablet Take 10 mEq by mouth daily       Skin Protectants, Misc. (EUCERIN) cream Apply topically as needed for dry skin Apply to bilateral feet and LE topically one time a day for dry skin and Apply to bilateral feet and LE topically as needed for dry skin         REVIEW OF SYSTEMS:  4 point ROS including Respiratory, CV, GI and , other than that noted in the HPI,  is negative    Objective:  /81   Pulse 96   Temp 96.7  F (35.9  C)   Resp 18   Ht 1.803 m (5' 11\")   Wt 96.5 kg (212 lb 12.8 oz)   SpO2 97%   BMI 29.68 kg/m    Exam:  GENERAL APPEARANCE:  Alert, in no distress  ENT:  Mouth and posterior oropharynx normal, moist mucous membranes, [place]  EYES:  EOM normal, conjunctiva and lids normal  NECK:  No adenopathy,masses or thyromegaly  RESP:  respiratory effort and palpation of chest normal, lungs clear to auscultation , no respiratory distress  CV:  Palpation and auscultation of heart done , regular rate and rhythm, no  murmur, +2 pedal pulses, peripheral edema  trace  in both LE  ABDOMEN: soft, nontender, no  masses  M/S:   gait steady with walker.  [place]AD with good strength. No joint inflammation  SKIN: left groin wound clean with granulation and small areas of slough, mild induration. No rashes or skin irritations of right groin.   PSYCH:  oriented X 3, memory impaired , affect and mood normal    Labs:   Labs done in SNF are in [facility]. Please refer to them using EPIC/Care Everywhere.    ASSESSMENT/PLAN:  (L02.214) Abscess of groin, left  (primary encounter diagnosis)  (L03.818) Cellulitis of other specified site  Comment: wound is healing well with no signs of infection. Completed Augmentin and Cipro 9/13/2019.   Augmentin restarted per on call provider 10/5 due to concerns about a right groin abscess. There is no sign of infection in the right groin and " "it may have been an infected or irritated hair follicle.   Plan: discontinue Augmentin 10/9/2019. Continue wound care as ordered. Urology and [facility] follow up as scheduled.      (E11.8) Type 2 diabetes mellitus with complication, unspecified whether long term insulin use (H)  Comment: improved control, now borderline hypoglycemic in the am. He is slowly learning how to check blood sugars and self admin insulin.   Plan: continue am Basaglar to 28 units, decrease to 15  units in  pm. Monitor blood sugars qid. Ongoing patient education re: diet, checking blood sugars and self administering insulin.      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   Plan: follow up with  Oncology. Continue decadron.      (R06.02) Shortness of breath  Comment: chronic and unchanged.No fever, cough or signs of infection.  CXR done 9/6/2019 showed chronic lung changes, no infiltrate, effusion or pulmonary edema. Daughter reports a recent \"normal\" cardiac work up.   Plan: monitor respiratory staff. Daughter has made arrangements for follow up scans with Dr Parry, his oncologist.      (R00.0) Sinus tachycardia  Comment: rate controlled   Plan: continue metoprolol. Monitor VS      (D64.9) Anemia, unspecified type  Comment: chronic, Hgb stable at 9.8.  No s/s of active bleeding   Plan: follow Hgb      (R60.0) Bilateral leg edema  Comment: improved. Renal function is at baseline.   Plan: continue lasix. Continue compression stockings      (R53.81) Physical deconditioning  Comment: completed therapies, but has not returned to his previous functional level. His wife is unable  to assist with his cares at home. Patient unable to perform wound care himself due to the area of the wound.   Plan: family is looking for AL placement.Encourage walking and exercise " program.         Electronically signed by:  CRISELDA Cohen CNP

## 2019-10-15 VITALS
TEMPERATURE: 97 F | DIASTOLIC BLOOD PRESSURE: 84 MMHG | RESPIRATION RATE: 16 BRPM | OXYGEN SATURATION: 95 % | BODY MASS INDEX: 29.93 KG/M2 | HEART RATE: 79 BPM | HEIGHT: 71 IN | WEIGHT: 213.8 LBS | SYSTOLIC BLOOD PRESSURE: 146 MMHG

## 2019-10-15 ASSESSMENT — MIFFLIN-ST. JEOR: SCORE: 1686.92

## 2019-10-15 NOTE — PROGRESS NOTES
[facility]  [place] Medical Record Number:  [id number]  Place of Service where encounter took place:  [facility] [[id number]]  Chief Complaint   Patient presents with     RECHECK       HPI:    Shaun Patel  is a 83 year old (1935), who is being seen today for an episodic care visit.  HPI information obtained from: facility chart records, facility staff, patient report and [facility] chart review.  He initially came to this facility 8/6/2019 following hospitalization at [facility] with left groin abscess, s/p I&D 7/31/2019. He was treated with IV antibiotics and discharged on Augmentin. He had poor wound healing and was rehospitalized 8/28/2019 for I&D  by Dr Curtis. Cultures grew E coli, Proteus and Enterococcus. ID consulted and he was treated with broad spectrum antibiotics. Discharged on Cipro and Augmentin through 9/13/2019.  He was noted to have rising blood sugars and HgbA1c was 9.3. Basaglar  and Novolog were started for new diagnosis of diabetes type 2. Decadron dose was decreased.  He had rectal bleeding and Hgb dropped to the 9s. He declined GI work up, as he has done during previous hospitalizations. [facility] nurse consulted and recommended bid wound care with Vashe moistened gauze, covered with ABD. He returned to the facility 9/4/2019 for ongoing rehab and medical management.      Today's concerns are:      Abscess of groin, left-he was seen in the [facility] 10/10/2019 and wound care was decreased to daily. Denies pain. Nurse reports he had a boil or ingrown hair of his right groin with small amount of drainage noted yesterday. He had a similar episode a week ago. Afebrile.   Cellulitis of other specified site  Type 2 diabetes mellitus with complication, with long-term current use of insulin (H)-blood sugars have been low in the am: 71-96, later in the day: 133-230, most readings are less than 170. Good appetite.   CLL (chronic lymphocytic leukemia)  (H)  Astrocytoma (H)  Nodular sclerosing Hodgkin's lymphoma, unspecified body region (H)  Shortness of breath-reports this is unchanged. No cough or chest pain.   Sinus tachycardia-HR: 77-86  BPs: 128/86, 108/70, 146/84, 129/77   Anemia, unspecified type  Bilateral leg edema-denies calf pain. Nurse reports patient's abdomen appears larger, but patient thinks it's unchanged. Weight is up 3 lbs from tcu admission.   Physical deconditioning-ambulating with walker and supervision to stand by assist. Requires assist of 1 with cares.    Past Medical and Surgical History reviewed in Epic today.    MEDICATIONS:  Current Outpatient Medications   Medication Sig Dispense Refill     acetaminophen (TYLENOL) 325 MG tablet Take 650 mg by mouth every 4 hours as needed for fever or pain       dexamethasone (DECADRON) 2 MG tablet Take 2 mg by mouth 2 times daily (with meals)        FUROSEMIDE PO Take 20 mg by mouth every morning        insulin aspart (NOVOLOG PEN) 100 UNIT/ML pen Inject Subcutaneous At Bedtime Inject as per sliding scale: if 0 - 399 = 0 No insulin for BS < 400; 400+ = 4, subcutaneously at bedtime       insulin aspart (NOVOLOG PEN) 100 UNIT/ML pen Inject Subcutaneous 3 times daily (with meals) Give subcutaneous TID with meals per blood glucose (mg/dL). Don't give corrective dose for PRN, post-prandial or nocturnal glucose checks unless ordered.  Blood Glucose.....Dose   <70...............See Hypoglycemia Protocol   ..........No insulin, give prandial insulin, if ordered  150-199........1 unit  200-249........2 units  250-299........3 units  300-349........4 units  350-399........5 units  400 or greater....6 units & call MD        insulin glargine (BASAGLAR KWIKPEN) 100 UNIT/ML pen Inject 31 Units Subcutaneous every morning        insulin glargine (BASAGLAR KWIKPEN) 100 UNIT/ML pen Inject 12 Units Subcutaneous At Bedtime        metoprolol succinate ER (TOPROL-XL) 25 MG 24 hr tablet Take 12.5 mg by mouth daily     "    nystatin (MYCOSTATIN) 595341 UNIT/GM external powder Apply topically 2 times daily        pantoprazole (PROTONIX) 20 MG EC tablet Take 40 mg by mouth daily       potassium chloride ER (K-DUR/KLOR-CON M) 10 MEQ CR tablet Take 10 mEq by mouth daily       Skin Protectants, Misc. (EUCERIN) cream Apply topically as needed for dry skin Apply to bilateral feet and LE topically one time a day for dry skin and Apply to bilateral feet and LE topically as needed for dry skin           REVIEW OF SYSTEMS:  4 point ROS including Respiratory, CV, GI and , other than that noted in the HPI,  is negative    Objective:  BP (!) 146/84   Pulse 79   Temp 97  F (36.1  C)   Resp 16   Ht 1.803 m (5' 11\")   Wt 97 kg (213 lb 12.8 oz)   SpO2 95%   BMI 29.82 kg/m    Exam:  GENERAL APPEARANCE:  Alert, in no distress  ENT:  Mouth and posterior oropharynx normal, moist mucous membranes, [place]  EYES:  EOM normal, conjunctiva and lids normal  NECK:  No adenopathy,masses or thyromegaly  RESP:  respiratory effort and palpation of chest normal, lungs clear to auscultation , no respiratory distress  CV:  Palpation and auscultation of heart done , regular rate and rhythm, no  murmur, +2 pedal pulses, peripheral edema  trace  in both LE  ABDOMEN: soft, nontender, mild distension, no  masses  M/S:   gait steady with walker.  [place]UHAM[place]AD with good strength. No joint inflammation  SKIN: left groin wound clean with granulation and small areas of slough, mild induration. Mild erythema of the right groin, no open areas or boils noted   PSYCH:  oriented X 3, memory impaired , affect and mood normal    Labs:   Labs done in SNF are in [place] EPIC. Please refer to them using EPIC/Care Everywhere.    ASSESSMENT/PLAN  (L02.214) Abscess of groin, left  (primary encounter diagnosis)  (L03.818) Cellulitis of other specified site  Comment: infection appears resolved. Wound is healing well. Completed Augmentin and Cipro 9/13/2019.   Plan: continue daily wound care. " "Follow up [facility] and Urology as scheduled.     (E11.8,  Z79.4) Type 2 diabetes mellitus with complication, with long-term current use of insulin (H)  Comment: continues with borderline hypoglycemia in the am, improved control later in the day. Basaglar pm dose was last decreased a week ago.   Plan: decrease pm Basaglar to 12 units, continue 31 units in the am. Monitor blood sugars quid. Continue patient education re: checking blood sugars and administering insulin.     (C91.10) CLL (chronic lymphocytic leukemia) (H)  (C71.9) Astrocytoma (H)  (C81.10) Nodular sclerosing Hodgkin's lymphoma, unspecified body region (H)  Comment: he has known liver masses, renal tumor and pancreatic cystic lesions. Appears to be developing mild ascites,which is new. He doesn't think there's any change in his abdomen, but tends to minimize his medical issues.   Plan: discussed with his daughter Yumiko Gonzales, who confirms they have follow up with his oncologist, Dr Parry, next month with repeat CT scans.     (R06.02) Shortness of breath  Comment: unchanged. No signs of infection or acute process. CXR done 9/6/2019 showed chronic lung changes, no infiltrate, effusion or pulmonary edema. Daughter reports a recent \"normal\" cardiac work up. Suspect this may be related to his underlying malignancies   Plan: monitor respiratory status     (R00.0) Sinus tachycardia  Comment: rate controlled   Plan: continue metoprolol. Monitor VS     (D64.9) Anemia, unspecified type  Comment: chronic, multifactorial. No s/s of active bleeding   Plan: CBCB    (R60.0) Bilateral leg edema  Comment: chronic and unchanged. No signs of DVT  Plan: continue compression stockings and elevate legs. BMP    (R53.81) Physical deconditioning  Comment: he has reached max potential and completed therapies. He remains at the facility for wound care.   Plan: encourage activity and exercise program. Up with assist.     Discussed with nurse manager and floor nurse. "       Electronically signed by:  CRISELDA Cohen CNP

## 2019-10-16 ENCOUNTER — NURSING HOME VISIT (OUTPATIENT)
Dept: GERIATRICS | Facility: CLINIC | Age: 84
End: 2019-10-16

## 2019-10-16 DIAGNOSIS — R06.02 SHORTNESS OF BREATH: ICD-10-CM

## 2019-10-16 DIAGNOSIS — R60.0 BILATERAL LEG EDEMA: ICD-10-CM

## 2019-10-16 DIAGNOSIS — L02.214 ABSCESS OF GROIN, LEFT: Primary | ICD-10-CM

## 2019-10-16 DIAGNOSIS — Z79.4 TYPE 2 DIABETES MELLITUS WITH COMPLICATION, WITH LONG-TERM CURRENT USE OF INSULIN (H): ICD-10-CM

## 2019-10-16 DIAGNOSIS — E11.8 TYPE 2 DIABETES MELLITUS WITH COMPLICATION, WITH LONG-TERM CURRENT USE OF INSULIN (H): ICD-10-CM

## 2019-10-16 DIAGNOSIS — C81.10 NODULAR SCLEROSING HODGKIN'S LYMPHOMA, UNSPECIFIED BODY REGION (H): ICD-10-CM

## 2019-10-16 DIAGNOSIS — R53.81 PHYSICAL DECONDITIONING: ICD-10-CM

## 2019-10-16 DIAGNOSIS — C91.10 CLL (CHRONIC LYMPHOCYTIC LEUKEMIA) (H): ICD-10-CM

## 2019-10-16 DIAGNOSIS — C71.9 ASTROCYTOMA (H): ICD-10-CM

## 2019-10-16 DIAGNOSIS — R00.0 SINUS TACHYCARDIA: ICD-10-CM

## 2019-10-16 DIAGNOSIS — D64.9 ANEMIA, UNSPECIFIED TYPE: ICD-10-CM

## 2019-10-16 DIAGNOSIS — L03.818 CELLULITIS OF OTHER SPECIFIED SITE: ICD-10-CM

## 2019-10-16 PROCEDURE — 99309 SBSQ NF CARE MODERATE MDM 30: CPT | Performed by: NURSE PRACTITIONER

## 2019-10-16 NOTE — LETTER
10/16/2019        RE: Shaun Patel  [address]  [facility] [address]        [place] [facility]  [place] [facility] Record Number:  [id number]  [facility] of [facility] took [facility] [[id number]]  Chief Complaint   Patient presents with     RECHECK       HPI:    Shaun Patel  is a 83 year old (1935), who is being seen today for an episodic care visit.  HPI information obtained from: facility chart records, facility staff, patient report and [facility] chart review.  He initially came to this facility 8/6/2019 following hospitalization at [facility] with left groin abscess, s/p I&D 7/31/2019. He was treated with IV antibiotics and discharged on Augmentin. He had poor wound healing and was rehospitalized 8/28/2019 for I&D  by Dr Curtis. Cultures grew E coli, Proteus and Enterococcus. ID consulted and he was treated with broad spectrum antibiotics. Discharged on Cipro and Augmentin through 9/13/2019.  He was noted to have rising blood sugars and HgbA1c was 9.3. Basaglar  and Novolog were started for new diagnosis of diabetes type 2. Decadron dose was decreased.  He had rectal bleeding and Hgb dropped to the 9s. He declined GI work up, as he has done during previous hospitalizations. [facility] nurse consulted and recommended bid wound care with Vashe moistened gauze, covered with ABD. He returned to the facility 9/4/2019 for ongoing rehab and medical management.      Today's concerns are:      Abscess of groin, left-he was seen in the [facility] 10/10/2019 and wound care was decreased to daily. Denies pain. Nurse reports he had a boil or ingrown hair of his right groin with small amount of drainage noted yesterday. He had a similar episode a week ago. Afebrile.   Cellulitis of other specified site  Type 2 diabetes mellitus with complication, with long-term current use of insulin (H)-blood sugars have been low in the am: 71-96, later in the day: 133-230, most  readings are less than 170. Good appetite.   CLL (chronic lymphocytic leukemia) (H)  Astrocytoma (H)  Nodular sclerosing Hodgkin's lymphoma, unspecified body region (H)  Shortness of breath-reports this is unchanged. No cough or chest pain.   Sinus tachycardia-HR: 77-86  BPs: 128/86, 108/70, 146/84, 129/77   Anemia, unspecified type  Bilateral leg edema-denies calf pain. Nurse reports patient's abdomen appears larger, but patient thinks it's unchanged. Weight is up 3 lbs from tcu admission.   Physical deconditioning-ambulating with walker and supervision to stand by assist. Requires assist of 1 with cares.    Past Medical and Surgical History reviewed in Epic today.    MEDICATIONS:  Current Outpatient Medications   Medication Sig Dispense Refill     acetaminophen (TYLENOL) 325 MG tablet Take 650 mg by mouth every 4 hours as needed for fever or pain       dexamethasone (DECADRON) 2 MG tablet Take 2 mg by mouth 2 times daily (with meals)        FUROSEMIDE PO Take 20 mg by mouth every morning        insulin aspart (NOVOLOG PEN) 100 UNIT/ML pen Inject Subcutaneous At Bedtime Inject as per sliding scale: if 0 - 399 = 0 No insulin for BS < 400; 400+ = 4, subcutaneously at bedtime       insulin aspart (NOVOLOG PEN) 100 UNIT/ML pen Inject Subcutaneous 3 times daily (with meals) Give subcutaneous TID with meals per blood glucose (mg/dL). Don't give corrective dose for PRN, post-prandial or nocturnal glucose checks unless ordered.  Blood Glucose.....Dose   <70...............See Hypoglycemia Protocol   ..........No insulin, give prandial insulin, if ordered  150-199........1 unit  200-249........2 units  250-299........3 units  300-349........4 units  350-399........5 units  400 or greater....6 units & call MD        insulin glargine (BASAGLAR KWIKPEN) 100 UNIT/ML pen Inject 31 Units Subcutaneous every morning        insulin glargine (BASAGLAR KWIKPEN) 100 UNIT/ML pen Inject 12 Units Subcutaneous At Bedtime         "metoprolol succinate ER (TOPROL-XL) 25 MG 24 hr tablet Take 12.5 mg by mouth daily        nystatin (MYCOSTATIN) 484563 UNIT/GM external powder Apply topically 2 times daily        pantoprazole (PROTONIX) 20 MG EC tablet Take 40 mg by mouth daily       potassium chloride ER (K-DUR/KLOR-CON M) 10 MEQ CR tablet Take 10 mEq by mouth daily       Skin Protectants, Misc. (EUCERIN) cream Apply topically as needed for dry skin Apply to bilateral feet and LE topically one time a day for dry skin and Apply to bilateral feet and LE topically as needed for dry skin           REVIEW OF SYSTEMS:  4 point ROS including Respiratory, CV, GI and , other than that noted in the HPI,  is negative    Objective:  BP (!) 146/84   Pulse 79   Temp 97  F (36.1  C)   Resp 16   Ht 1.803 m (5' 11\")   Wt 97 kg (213 lb 12.8 oz)   SpO2 95%   BMI 29.82 kg/m     Exam:  GENERAL APPEARANCE:  Alert, in no distress  ENT:  Mouth and posterior oropharynx normal, moist mucous membranes, [place]  EYES:  EOM normal, conjunctiva and lids normal  NECK:  No adenopathy,masses or thyromegaly  RESP:  respiratory effort and palpation of chest normal, lungs clear to auscultation , no respiratory distress  CV:  Palpation and auscultation of heart done , regular rate and rhythm, no  murmur, +2 pedal pulses, peripheral edema  trace  in both LE  ABDOMEN: soft, nontender, mild distension, no  masses  M/S:   gait steady with walker.  MUHAMMAD with good strength. No joint inflammation  SKIN: left groin wound clean with granulation and small areas of slough, mild induration. Mild erythema of the right groin, no open areas or boils noted   PSYCH:  oriented X 3, memory impaired , affect and mood normal    Labs:   Labs done in SNF are in [place] EPIC. Please refer to them using EPIC/Care Everywhere.    ASSESSMENT/PLAN  (L02.214) Abscess of groin, left  (primary encounter diagnosis)  (L03.818) Cellulitis of other specified site  Comment: infection appears resolved. Wound is " "healing well. Completed Augmentin and Cipro 9/13/2019.   Plan: continue daily wound care. Follow up [facility] as scheduled.     (E11.8,  Z79.4) Type 2 diabetes mellitus with complication, with long-term current use of insulin (H)  Comment: continues with borderline hypoglycemia in the am, improved control later in the day. Basaglar pm dose was last decreased a week ago.   Plan: decrease pm Basaglar to 12 units, continue 31 units in the am. Monitor blood sugars quid. Continue patient education re: checking blood sugars and administering insulin.     (C91.10) CLL (chronic lymphocytic leukemia) (H)  (C71.9) Astrocytoma (H)  (C81.10) Nodular sclerosing Hodgkin's lymphoma, unspecified body region (H)  Comment: he has known liver masses, renal tumor and pancreatic cystic lesions. Appears to be developing mild ascites,which is new. He doesn't think there's any change in his abdomen, but tends to minimize his medical issues.   Plan: discussed with his daughter Yumiko Gonzales, who confirms they have follow up with his oncologist, Dr Parry, next month with repeat CT scans.     (R06.02) Shortness of breath  Comment: unchanged. No signs of infection or acute process. CXR done 9/6/2019 showed chronic lung changes, no infiltrate, effusion or pulmonary edema. Daughter reports a recent \"normal\" cardiac work up. Suspect this may be related to his underlying malignancies   Plan: monitor respiratory status     (R00.0) Sinus tachycardia  Comment: rate controlled   Plan: continue metoprolol. Monitor VS     (D64.9) Anemia, unspecified type  Comment: chronic, multifactorial. No s/s of active bleeding   Plan: CBCB    (R60.0) Bilateral leg edema  Comment: chronic and unchanged. No signs of DVT  Plan: continue compression stockings and elevate legs. BMP    (R53.81) Physical deconditioning  Comment: he has reached max potential and completed therapies. He remains at the facility for wound care.   Plan: encourage activity " and exercise program. Up with assist.     Discussed with nurse manager and floor nurse.       Electronically signed by:  CRISELDA Cohen CNP               Sincerely,        CRISELDA Cohen CNP

## 2019-10-17 ENCOUNTER — HOSPITAL LABORATORY (OUTPATIENT)
Dept: OTHER | Facility: CLINIC | Age: 84
End: 2019-10-17

## 2019-10-17 LAB
ANION GAP SERPL CALCULATED.3IONS-SCNC: 8 MMOL/L (ref 3–14)
BUN SERPL-MCNC: 35 MG/DL (ref 7–30)
CALCIUM SERPL-MCNC: 8.1 MG/DL (ref 8.5–10.1)
CHLORIDE SERPL-SCNC: 108 MMOL/L (ref 94–109)
CO2 SERPL-SCNC: 26 MMOL/L (ref 20–32)
CREAT SERPL-MCNC: 1.1 MG/DL (ref 0.66–1.25)
ERYTHROCYTE [DISTWIDTH] IN BLOOD BY AUTOMATED COUNT: 15.9 % (ref 10–15)
GFR SERPL CREATININE-BSD FRML MDRD: 61 ML/MIN/{1.73_M2}
GLUCOSE SERPL-MCNC: 81 MG/DL (ref 70–99)
HCT VFR BLD AUTO: 41.1 % (ref 40–53)
HGB BLD-MCNC: 13.1 G/DL (ref 13.3–17.7)
MCH RBC QN AUTO: 30.6 PG (ref 26.5–33)
MCHC RBC AUTO-ENTMCNC: 31.9 G/DL (ref 31.5–36.5)
MCV RBC AUTO: 96 FL (ref 78–100)
PLATELET # BLD AUTO: 172 10E9/L (ref 150–450)
POTASSIUM SERPL-SCNC: 4 MMOL/L (ref 3.4–5.3)
RBC # BLD AUTO: 4.28 10E12/L (ref 4.4–5.9)
SODIUM SERPL-SCNC: 142 MMOL/L (ref 133–144)
WBC # BLD AUTO: 19.5 10E9/L (ref 4–11)

## 2019-10-22 ENCOUNTER — NURSING HOME VISIT (OUTPATIENT)
Dept: GERIATRICS | Facility: CLINIC | Age: 84
End: 2019-10-22
Payer: COMMERCIAL

## 2019-10-22 VITALS
HEIGHT: 71 IN | HEART RATE: 94 BPM | SYSTOLIC BLOOD PRESSURE: 101 MMHG | RESPIRATION RATE: 18 BRPM | BODY MASS INDEX: 29.93 KG/M2 | DIASTOLIC BLOOD PRESSURE: 68 MMHG | WEIGHT: 213.8 LBS | OXYGEN SATURATION: 95 % | TEMPERATURE: 97.4 F

## 2019-10-22 DIAGNOSIS — L03.818 CELLULITIS OF OTHER SPECIFIED SITE: ICD-10-CM

## 2019-10-22 DIAGNOSIS — C71.9 ASTROCYTOMA (H): ICD-10-CM

## 2019-10-22 DIAGNOSIS — E11.8 TYPE 2 DIABETES MELLITUS WITH COMPLICATION, WITH LONG-TERM CURRENT USE OF INSULIN (H): ICD-10-CM

## 2019-10-22 DIAGNOSIS — L73.9 FOLLICULITIS: ICD-10-CM

## 2019-10-22 DIAGNOSIS — R00.0 SINUS TACHYCARDIA: ICD-10-CM

## 2019-10-22 DIAGNOSIS — L02.214 ABSCESS OF GROIN, LEFT: Primary | ICD-10-CM

## 2019-10-22 DIAGNOSIS — C81.10 NODULAR SCLEROSING HODGKIN'S LYMPHOMA, UNSPECIFIED BODY REGION (H): ICD-10-CM

## 2019-10-22 DIAGNOSIS — R60.0 BILATERAL LEG EDEMA: ICD-10-CM

## 2019-10-22 DIAGNOSIS — D64.9 ANEMIA, UNSPECIFIED TYPE: ICD-10-CM

## 2019-10-22 DIAGNOSIS — C91.10 CLL (CHRONIC LYMPHOCYTIC LEUKEMIA) (H): ICD-10-CM

## 2019-10-22 DIAGNOSIS — R53.81 PHYSICAL DECONDITIONING: ICD-10-CM

## 2019-10-22 DIAGNOSIS — R06.02 SHORTNESS OF BREATH: ICD-10-CM

## 2019-10-22 DIAGNOSIS — Z79.4 TYPE 2 DIABETES MELLITUS WITH COMPLICATION, WITH LONG-TERM CURRENT USE OF INSULIN (H): ICD-10-CM

## 2019-10-22 PROCEDURE — 99309 SBSQ NF CARE MODERATE MDM 30: CPT | Performed by: NURSE PRACTITIONER

## 2019-10-22 ASSESSMENT — MIFFLIN-ST. JEOR: SCORE: 1686.92

## 2019-10-22 NOTE — PROGRESS NOTES
[place] [facility]  [place] Medical Record Number:  [id number]  Place of Service where encounter took place:  [facility] [[id number]]  Chief Complaint   Patient presents with     RECHECK       HPI:    Shaun Patel  is a 83 year old (1935), who is being seen today for an episodic care visit.  HPI information obtained from: facility chart records, facility staff, patient report and [facility] chart review.   He initially came to this facility 8/6/2019 following hospitalization at [facility] with left groin abscess, s/p I&D 7/31/2019. He was treated with IV antibiotics and discharged on Augmentin. He had poor wound healing and was rehospitalized 8/28/2019 for I&D  by Dr Curtis. Cultures grew E coli, Proteus and Enterococcus. ID consulted and he was treated with broad spectrum antibiotics. Discharged on Cipro and Augmentin through 9/13/2019.  He was noted to have rising blood sugars and HgbA1c was 9.3. Basaglar  and Novolog were started for new diagnosis of diabetes type 2. Decadron dose was decreased.  He had rectal bleeding and Hgb dropped to the 9s. He declined GI work up, as he has done during previous hospitalizations. [facility] nurse consulted and recommended bid wound care with Vashe moistened gauze, covered with ABD. He returned to the facility 9/4/2019 for ongoing rehab and medical management.     Today's concern is:     Abscess of groin, left-denies pain. He was seen in the [facility] 10/10/2019 and wound care was decreased to daily.   Cellulitis of other specified site  Folliculitis-he has had recurrent pustules of his right groin at the hair follicles. Nurse reports an area had thick yellow drainage earlier today.  Type 2 diabetes mellitus with complication, with long-term current use of insulin (H)-blood sugars: .  Good appetite   CLL (chronic lymphocytic leukemia) (H)  Astrocytoma (H)  Nodular sclerosing Hodgkin's lymphoma, unspecified body region (H)  Shortness of  breath-reports this is unchanged. No cough or chest pain.   Sinus tachycardia-HR: 74-99   BPs: 138/83, 105/69, 134/85, 114/75   Anemia, unspecified type  Bilateral leg edema-wearing compression stockings. Denies pain of his legs.   Physical deconditioning-he has completed therapies. Ambulating with walker and supervision. Requires assist of 1 with cares.     Past Medical and Surgical History reviewed in Epic today.    MEDICATIONS:  Current Outpatient Medications   Medication Sig Dispense Refill     acetaminophen (TYLENOL) 325 MG tablet Take 650 mg by mouth every 4 hours as needed for fever or pain       dexamethasone (DECADRON) 2 MG tablet Take 2 mg by mouth 2 times daily (with meals)        FUROSEMIDE PO Take 20 mg by mouth every morning        insulin aspart (NOVOLOG PEN) 100 UNIT/ML pen Inject Subcutaneous At Bedtime Inject as per sliding scale: if 0 - 399 = 0 No insulin for BS < 400; 400+ = 4, subcutaneously at bedtime       insulin aspart (NOVOLOG PEN) 100 UNIT/ML pen Inject Subcutaneous 3 times daily (with meals) Give subcutaneous TID with meals per blood glucose (mg/dL). Don't give corrective dose for PRN, post-prandial or nocturnal glucose checks unless ordered.  Blood Glucose.....Dose   <70...............See Hypoglycemia Protocol   ..........No insulin, give prandial insulin, if ordered  150-199........1 unit  200-249........2 units  250-299........3 units  300-349........4 units  350-399........5 units  400 or greater....6 units & call MD        insulin glargine (BASAGLAR KWIKPEN) 100 UNIT/ML pen Inject 31 Units Subcutaneous every morning        insulin glargine (BASAGLAR KWIKPEN) 100 UNIT/ML pen Inject 12 Units Subcutaneous At Bedtime        metoprolol succinate ER (TOPROL-XL) 25 MG 24 hr tablet Take 12.5 mg by mouth daily        nystatin (MYCOSTATIN) 771219 UNIT/GM external powder Apply topically 2 times daily        pantoprazole (PROTONIX) 20 MG EC tablet Take 40 mg by mouth daily       potassium  "chloride ER (K-DUR/KLOR-CON M) 10 MEQ CR tablet Take 10 mEq by mouth daily       Skin Protectants, Misc. (EUCERIN) cream Apply topically as needed for dry skin Apply to bilateral feet and LE topically one time a day for dry skin and Apply to bilateral feet and LE topically as needed for dry skin         REVIEW OF SYSTEMS:  4 point ROS including Respiratory, CV, GI and , other than that noted in the HPI,  is negative    Objective:  /68   Pulse 94   Temp 97.4  F (36.3  C)   Resp 18   Ht 1.803 m (5' 11\")   Wt 97 kg (213 lb 12.8 oz)   SpO2 95%   BMI 29.82 kg/m    Exam:  GENERAL APPEARANCE:  Alert, in no distress  ENT:  Mouth and posterior oropharynx normal, moist mucous membranes, [place]  EYES:  EOM normal, conjunctiva and lids normal  NECK:  No adenopathy,masses or thyromegaly  RESP:  respiratory effort and palpation of chest normal, lungs clear to auscultation , no respiratory distress  CV:  Palpation and auscultation of heart done , regular rate and rhythm, no  murmur, +2 pedal pulses, peripheral edema  trace  in both LE  ABDOMEN: soft, nontender, mild distension,  no  masses  M/S:   gait steady with walker.  MUHAMMAD with good strength. No joint inflammation  SKIN: left groin wound clean with granulation and small areas of slough, mild induration. Inflammation of hair follicle on the right groin with yellow discharge, mild surrounding erythema, no warmth or induration.   PSYCH:  oriented X 3, memory impaired , affect and mood normal    Labs:   Labs done in SNF are in [place] EPIC. Please refer to them using EPIC/Care Everywhere.    ASSESSMENT/PLAN:  (L02.214) Abscess of groin, left  (primary encounter diagnosis)  Comment: wound healing well with no signs of infection. Completed Augmentin and Cipro 9/13/2019.   Plan: continue daily wound care with Vashe solution and light packing.     (L03.818) Cellulitis of other specified site  (L73.9) Folliculitis  Comment: he's had recurrent areas that appear to be " "ingrown hairs and folliculitis of the right groin.  One area is draining today. He's high risk for recurrent infections due to his CLL and multiple comorbidities   Plan: Augmentin for 7 days and monitor. Keep area clean and dry, continue nystatin powder to groin.   Discussed with nurse.     (E11.8,  Z79.4) Type 2 diabetes mellitus with complication, with long-term current use of insulin (H)  Comment: improved control.   Plan: continue current dose of Basaglar. Monitor blood sugars. He's feeling  comfortable checking blood sugars and administering insulin.     (C91.10) CLL (chronic lymphocytic leukemia) (H)  (C71.9) Astrocytoma (H)  (C81.10) Nodular sclerosing Hodgkin's lymphoma, unspecified body region (H)  Comment: appears to be developing mild ascites, although he doesn't think his abdomen is larger than usual. He has known liver masses, renal tumor and pancreatic cystic lesion. Discussed possible ascites with his daughter last week.   Plan: follow up with Oncology with CT scans next month as scheduled.     (R06.02) Shortness of breath  Comment: no signs of infection or acute process. CXR done 9/6/2019 showed chronic lung changes, no infiltrate, effusion or pulmonary edema. Daughter reports a recent \"normal\" cardiac work up. Suspect this may be related to his underlying malignancies   Plan: monitor respiratory status. Follow up with Oncology      (R00.0) Sinus tachycardia  Comment: rate controlled   Plan: continue metoprolol. Monitor VS     (D64.9) Anemia, unspecified type  Comment:chronic. No s/s of active bleeding   Plan: follow Hgb prn     (R60.0) Bilateral leg edema  Comment: chronic, improved   Plan: continue compression stockings and elevate legs    (R53.81) Physical deconditioning  Comment: he has completed therapies and remains at the facility for wound care. He's been interviewing home care companies, but has not made a decision re: discharge.   Plan: encourage activity. Disposition discussed with social " worker and nurse manager.       Electronically signed by:  CRISELDA Cohen CNP

## 2019-10-22 NOTE — LETTER
10/22/2019        RE: Shaun Patel  [address]  [facility] [address]        [facility]  [facility] [facility] Record Number:  [id number]  Place of Service where encounter took place:  [facility] [[id number]]  Chief Complaint   Patient presents with     RECHECK       HPI:    Shaun Patel  is a 83 year old (1935), who is being seen today for an episodic care visit.  HPI information obtained from: facility chart records, facility staff, patient report and [facility] chart review.   He initially came to this facility 8/6/2019 following hospitalization at [facility] with left groin abscess, s/p I&D 7/31/2019. He was treated with IV antibiotics and discharged on Augmentin. He had poor wound healing and was rehospitalized 8/28/2019 for I&D  by Dr Curtis. Cultures grew E coli, Proteus and Enterococcus. ID consulted and he was treated with broad spectrum antibiotics. Discharged on Cipro and Augmentin through 9/13/2019.  He was noted to have rising blood sugars and HgbA1c was 9.3. Basaglar  and Novolog were started for new diagnosis of diabetes type 2. Decadron dose was decreased.  He had rectal bleeding and Hgb dropped to the 9s. He declined GI work up, as he has done during previous hospitalizations. [facility] nurse consulted and recommended bid wound care with Vashe moistened gauze, covered with ABD. He returned to the facility 9/4/2019 for ongoing rehab and medical management.     Today's concern is:     Abscess of groin, left-denies pain. He was seen in the [facility] 10/10/2019 and wound care was decreased to daily.   Cellulitis of other specified site  Folliculitis-he has had recurrent pustules of his right groin at the hair follicles. Nurse reports an area had thick yellow drainage earlier today.  Type 2 diabetes mellitus with complication, with long-term current use of insulin (H)-blood sugars: 107-186, outlier 206  Good appetite   CLL (chronic lymphocytic leukemia)  (H)  Astrocytoma (H)  Nodular sclerosing Hodgkin's lymphoma, unspecified body region (H)  Shortness of breath-reports this is unchanged. No cough or chest pain.   Sinus tachycardia-HR: 74-99   BPs: 138/83, 105/69, 134/85, 114/75   Anemia, unspecified type  Bilateral leg edema-wearing compression stockings. Denies pain of his legs.   Physical deconditioning-he has completed therapies. Ambulating with walker and supervision. Requires assist of 1 with cares.     Past Medical and Surgical History reviewed in Epic today.    MEDICATIONS:  Current Outpatient Medications   Medication Sig Dispense Refill     acetaminophen (TYLENOL) 325 MG tablet Take 650 mg by mouth every 4 hours as needed for fever or pain       dexamethasone (DECADRON) 2 MG tablet Take 2 mg by mouth 2 times daily (with meals)        FUROSEMIDE PO Take 20 mg by mouth every morning        insulin aspart (NOVOLOG PEN) 100 UNIT/ML pen Inject Subcutaneous At Bedtime Inject as per sliding scale: if 0 - 399 = 0 No insulin for BS < 400; 400+ = 4, subcutaneously at bedtime       insulin aspart (NOVOLOG PEN) 100 UNIT/ML pen Inject Subcutaneous 3 times daily (with meals) Give subcutaneous TID with meals per blood glucose (mg/dL). Don't give corrective dose for PRN, post-prandial or nocturnal glucose checks unless ordered.  Blood Glucose.....Dose   <70...............See Hypoglycemia Protocol   ..........No insulin, give prandial insulin, if ordered  150-199........1 unit  200-249........2 units  250-299........3 units  300-349........4 units  350-399........5 units  400 or greater....6 units & call MD        insulin glargine (BASAGLAR KWIKPEN) 100 UNIT/ML pen Inject 31 Units Subcutaneous every morning        insulin glargine (BASAGLAR KWIKPEN) 100 UNIT/ML pen Inject 12 Units Subcutaneous At Bedtime        metoprolol succinate ER (TOPROL-XL) 25 MG 24 hr tablet Take 12.5 mg by mouth daily        nystatin (MYCOSTATIN) 590661 UNIT/GM external powder Apply topically  "2 times daily        pantoprazole (PROTONIX) 20 MG EC tablet Take 40 mg by mouth daily       potassium chloride ER (K-DUR/KLOR-CON M) 10 MEQ CR tablet Take 10 mEq by mouth daily       Skin Protectants, Misc. (EUCERIN) cream Apply topically as needed for dry skin Apply to bilateral feet and LE topically one time a day for dry skin and Apply to bilateral feet and LE topically as needed for dry skin         REVIEW OF SYSTEMS:  4 point ROS including Respiratory, CV, GI and , other than that noted in the HPI,  is negative    Objective:  /68   Pulse 94   Temp 97.4  F (36.3  C)   Resp 18   Ht 1.803 m (5' 11\")   Wt 97 kg (213 lb 12.8 oz)   SpO2 95%   BMI 29.82 kg/m     Exam:  GENERAL APPEARANCE:  Alert, in no distress  ENT:  Mouth and posterior oropharynx normal, moist mucous membranes, [place]  EYES:  EOM normal, conjunctiva and lids normal  NECK:  No adenopathy,masses or thyromegaly  RESP:  respiratory effort and palpation of chest normal, lungs clear to auscultation , no respiratory distress  CV:  Palpation and auscultation of heart done , regular rate and rhythm, no  murmur, +2 pedal pulses, peripheral edema  trace  in both LE  ABDOMEN: soft, nontender,  mild distension,  no  masses  M/S:   gait steady with walker.  MUHAMMAD with good strength. No joint inflammation  SKIN: left groin wound clean with granulation and small areas of slough, mild induration. Inflammation of hair follicle on the right groin with yellow discharge, mild surrounding erythema, no warmth or induration.   PSYCH:  oriented X 3, memory impaired , affect and mood normal    Labs:   Labs done in SNF are in [place] EPIC. Please refer to them using EPIC/Care Everywhere.    ASSESSMENT/PLAN:  (L02.214) Abscess of groin, left  (primary encounter diagnosis)  Comment: wound healing well with no signs of infection. Completed Augmentin and Cipro 9/13/2019.   Plan: continue daily wound care with Vashe solution and light packing.     (L03.039) Cellulitis " "of other specified site  (L73.9) Folliculitis  Comment: he's had recurrent areas that appear to be ingrown hairs and folliculitis of the right groin.  One area is draining today. He's high risk for recurrent infections due to his CLL and multiple comorbidities   Plan: Augmentin for 7 days and monitor. Keep area clean and dry, continue nystatin powder to groin.   Discussed with nurse.     (E11.8,  Z79.4) Type 2 diabetes mellitus with complication, with long-term current use of insulin (H)  Comment: improved control.   Plan: continue current dose of Basaglar. Monitor blood sugars. He's feeling  comfortable checking blood sugars and administering insulin.     (C91.10) CLL (chronic lymphocytic leukemia) (H)  (C71.9) Astrocytoma (H)  (C81.10) Nodular sclerosing Hodgkin's lymphoma, unspecified body region (H)  Comment: appears to be developing mild ascites, although he doesn't think his abdomen is larger than usual. He has known liver masses, renal tumor and pancreatic cystic lesion. Discussed possible ascites with his daughter last week.   Plan: follow up with Oncology with CT scans next month as scheduled.     (R06.02) Shortness of breath  Comment:  no signs of infection or acute process. CXR done 9/6/2019 showed chronic lung changes, no infiltrate, effusion or pulmonary edema. Daughter reports a recent \"normal\" cardiac work up. Suspect this may be related to his underlying malignancies   Plan: monitor respiratory status. Follow up with Oncology      (R00.0) Sinus tachycardia  Comment: rate controlled   Plan: continue metoprolol. Monitor VS     (D64.9) Anemia, unspecified type  Comment:chronic. No s/s of active bleeding   Plan: follow Hgb prn     (R60.0) Bilateral leg edema  Comment: chronic, improved   Plan: continue compression stockings and elevate legs    (R53.81) Physical deconditioning  Comment: he has completed therapies and remains at the facility for wound care. He's been interviewing home care companies, but " has not made a decision re: discharge.   Plan: encourage activity. Disposition discussed with  and nurse manager.       Electronically signed by:  CRISELDA Cohen CNP               Sincerely,        CRISELDA Cohen CNP

## 2019-10-24 VITALS
WEIGHT: 216.2 LBS | OXYGEN SATURATION: 98 % | HEIGHT: 71 IN | SYSTOLIC BLOOD PRESSURE: 131 MMHG | BODY MASS INDEX: 30.27 KG/M2 | RESPIRATION RATE: 20 BRPM | HEART RATE: 80 BPM | DIASTOLIC BLOOD PRESSURE: 90 MMHG | TEMPERATURE: 96.9 F

## 2019-10-24 ASSESSMENT — MIFFLIN-ST. JEOR: SCORE: 1697.81

## 2019-10-24 NOTE — PROGRESS NOTES
[facility] DISCHARGE SUMMARY  PATIENT'S NAME: Shaun Patel  YOB: 1935  MEDICAL RECORD NUMBER:  [id number]  Place of Service where encounter took place:  [facility] [[id number]]    PRIMARY CARE PROVIDER AND CLINIC RESPONSIBLE AFTER TRANSFER:   Jazmine Rachel [facility] [address] / [facility] [address]    Non-[facility] Provider     Transferring providers: CRISELDA Cohen CNP, nAdriy Castellano MD  Recent Hospitalization/ED:  [facility]  stay 8/28/19 to 9/4/19  Date of SNF Admission: August / 04 / 2019  Date of SNF (anticipated) Discharge: October / 27 / 2019  Discharged to: previous independent home  Cognitive Scores: SLUMS: 22/30 and CPT: 5.2/5.6  DME: Walker    CODE STATUS/ADVANCE DIRECTIVES DISCUSSION:  DNR / DNI   ALLERGIES: Allopurinol; Simvastatin; and Sulfamethoxazole-trimethoprim    DISCHARGE DIAGNOSIS/NURSING FACILITY COURSE:   He initially came to this facility 8/6/2019 following hospitalization at [facility] with left groin abscess, s/p I&D 7/31/2019. He was treated with IV antibiotics and discharged on Augmentin. He had poor wound healing and was rehospitalized 8/28/2019 for I&D  by Dr Curtis. Cultures grew E coli, Proteus and Enterococcus. ID consulted and he was treated with broad spectrum antibiotics. Discharged on Cipro and Augmentin through 9/13/2019.  He was noted to have rising blood sugars and HgbA1c was 9.3. Basaglar  and Novolog were started for new diagnosis of diabetes type 2. Decadron dose was decreased.  He had rectal bleeding and Hgb dropped to the 9s. He declined GI work up, as he has done during previous hospitalizations. [facility] nurse consulted and recommended bid wound care with Vashe moistened gauze, covered with ABD. He returned to the facility 9/4/2019 for ongoing rehab and medical management.      He has worked with PHYSICAL THERAPY and OT with good progress and completed therapies several weeks  ago. Ambulating 280 ft with walker and stand by to contact guard assist. Requires supervision with toileting and min assist with lower body dressing and bathing. TUG 26 seconds, indicating high fall risk.   He's had a prolonged tcu stay for wound care and unclear disposition. Recommendation is for AL, which he and his family have declined.   ASSESSMENT / PLAN:  (L02.214) Abscess of groin, left  (primary encounter diagnosis)  (L03.818) Cellulitis of other specified site  Comment: completed Augmentin and Cipro 9/13/2109 with no signs of recurrent infection. Wound continues to slowly heal. He was last seen at the [facility] 10/10/2019 and wound care was decreased to daily. He is medically stable for discharge .  Plan: discharge home with family assistance. Continue daily wound care using Vashe solution and light packing. Home care services and follow up as below.     (E11.8,  Z79.4) Type 2 diabetes mellitus with complication, with long-term current use of insulin (H)  Comment: new diagnosis. Insulin has been adjusted with improved control. Recent blood sugars are on the low range in the am: 65-90, higher later in the day at 144-232. He has been educated on checking blood sugars and self administration of insulin, and feels he will be able to manage this at home with assistance from home care.   Plan: increase am dose of Basaglar to 32 units and decrease pm dose to 10 units. Discontinue Novolog sliding scale to make his home regimen easier. Monitor blood sugars qid. Follow up with PCP.     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 Appears to be developing mild ascites over the past 2 weeks, although he doesn't think his abdomen has changed. This has been discussed with his daughter, who has an appointment scheduled with Oncology  "mid November 2019.   Plan: follow up with Dr Parry with CT scans, as scheduled by daughter. Continue decadron.     (R06.02) Shortness of breath  Comment: chronic and unchanged. No s/s of infection or acute process. CXR done 9/6/2019 showed chronic lung changes, no infiltrate, effusion or pulmonary edema. Daughter reports a recent \"normal\" cardiac work up. Suspect this may be related to his underlying malignancies   Plan: monitor respiratory status. Follow up with Oncology     (R00.0) Sinus tachycardia  Comment: rate controlled: 72-93  Plan: continue metoprolol     (D64.9) Anemia, unspecified type  Comment: chronic, improved with Hgb 13.1  Plan: follow up labs per Oncology and PCP    (R60.0) Bilateral leg edema  Comment: chronic and improved. No s/s of DVT  Plan: continue compression stockings and elevate legs. Continue lasix.     (L73.9) Folliculitis  Comment: he's had several pustules of his right groin in the past few weeks that appear to be related to folliculitis and ingrown hairs. One area was noted to be draining 10/22/2019 and due to his risk for recurrent infections, Augmentin was started. The area has improved with no drainage or erythema.   Plan: complete 7 day course of Augmentin. Keep area clean and dry, continue nystatin powder.     (R53.81) Physical deconditioning  Comment: progress in therapies as above.  Plan: home therapies for ongoing gait training, strengthening and ADL safety.       Past Medical History:  has a past medical history of Astrocytoma (H) (2017), Chronic anemia, CLL (chronic lymphocytic leukemia) (H) (2000), Focal seizure (H) (2017), GERD (gastroesophageal reflux disease), Liposarcoma of left shoulder (H), and Lymphoma (H) (2017).    Discharge Medications:  Current Outpatient Medications   Medication Sig Dispense Refill     acetaminophen (TYLENOL) 325 MG tablet Take 650 mg by mouth every 4 hours as needed for fever or pain       amoxicillin-clavulanate (AUGMENTIN) 875-125 MG tablet " "Take 1 tablet by mouth 2 times daily       dexamethasone (DECADRON) 2 MG tablet Take 2 mg by mouth 2 times daily (with meals)        FUROSEMIDE PO Take 20 mg by mouth every morning        insulin glargine (BASAGLAR KWIKPEN) 100 UNIT/ML pen Inject 32 Units Subcutaneous every morning        insulin glargine (BASAGLAR KWIKPEN) 100 UNIT/ML pen Inject 10 Units Subcutaneous At Bedtime        metoprolol succinate ER (TOPROL-XL) 25 MG 24 hr tablet Take 12.5 mg by mouth daily        nystatin (MYCOSTATIN) 436898 UNIT/GM external powder Apply topically 2 times daily        pantoprazole (PROTONIX) 20 MG EC tablet Take 40 mg by mouth daily       potassium chloride ER (K-DUR/KLOR-CON M) 10 MEQ CR tablet Take 10 mEq by mouth daily       Skin Protectants, Misc. (EUCERIN) cream Apply topically as needed for dry skin Apply to bilateral feet and LE topically one time a day for dry skin and Apply to bilateral feet and LE topically as needed for dry skin         Medication Changes/Rationale:     Augmentin and Cipro completed 9/13/2019    Augmentin resumed 10/22/2019 for 7 days for folliculitis/ingrown hair right groin    Basaglar insulin adjusted    Novolog sliding scale discontinued at tcu discharge    Additional lasix 20 mg daily given 9/20 and 9/21/2019 for shortness of breath.     Controlled medications sent with patient:   not applicable/none     ROS:   4 point ROS including Respiratory, CV, GI and , other than that noted in the HPI,  is negative    Physical Exam:   Vitals: BP (!) 131/90   Pulse 80   Temp 96.9  F (36.1  C)   Resp 20   Ht 1.803 m (5' 11\")   Wt 98.1 kg (216 lb 3.2 oz)   SpO2 98%   BMI 30.15 kg/m    BMI= Body mass index is 30.15 kg/m .  GENERAL APPEARANCE:  Alert, in no distress  ENT:  Mouth and posterior oropharynx normal, moist mucous membranes, [place]  EYES:  EOM normal, conjunctiva and lids normal  NECK:  No adenopathy,masses or thyromegaly  RESP:  respiratory effort and palpation of chest normal, lungs " clear to auscultation , no respiratory distress  CV:  Palpation and auscultation of heart done , regular rate and rhythm, no  murmur, +2 pedal pulses, peripheral edema  trace  in both LE  ABDOMEN: soft, nontender, mild distension,  no  masses  M/S:   gait steady with walker.  MUHAMMAD with good strength. No joint inflammation  SKIN: left groin wound clean with granulation and small areas of slough, mild induration. No erythema, drainage or follicle  inflammation right groin.   PSYCH:  oriented X 3, memory impaired , affect and mood normal    SNF labs: Labs done in SNF are in [place] EPIC. Please refer to them using EPIC/Care Everywhere.      DISCHARGE PLAN:    Follow up labs: per PCP and Oncology     Medical Follow Up:      Follow up with primary care provider within 1 week    Follow up with Oncology as scheduled by daughter    Follow up with [facility] 11/4/2019    MTM referral needed and placed by this provider: No    Discharge Services: Home Care:  Occupational Therapy, Physical Therapy, Registered Nurse, Home Health Aide and SW From:  [facility]    Discharge Instructions Verbalized to Patient at Discharge:     Monitor blood sugars qid    Wound care as instructed (he will have daily nurse visits for wound care)      TOTAL DISCHARGE TIME:   Greater than 30 minutes  Electronically signed by:  CRIESLDA Cohen CNP

## 2019-10-25 ENCOUNTER — DISCHARGE SUMMARY NURSING HOME (OUTPATIENT)
Dept: GERIATRICS | Facility: CLINIC | Age: 84
End: 2019-10-25
Payer: COMMERCIAL

## 2019-10-25 DIAGNOSIS — R06.02 SHORTNESS OF BREATH: ICD-10-CM

## 2019-10-25 DIAGNOSIS — D64.9 ANEMIA, UNSPECIFIED TYPE: ICD-10-CM

## 2019-10-25 DIAGNOSIS — L02.214 ABSCESS OF GROIN, LEFT: Primary | ICD-10-CM

## 2019-10-25 DIAGNOSIS — R53.81 PHYSICAL DECONDITIONING: ICD-10-CM

## 2019-10-25 DIAGNOSIS — Z79.4 TYPE 2 DIABETES MELLITUS WITH COMPLICATION, WITH LONG-TERM CURRENT USE OF INSULIN (H): ICD-10-CM

## 2019-10-25 DIAGNOSIS — C91.10 CLL (CHRONIC LYMPHOCYTIC LEUKEMIA) (H): ICD-10-CM

## 2019-10-25 DIAGNOSIS — R60.0 BILATERAL LEG EDEMA: ICD-10-CM

## 2019-10-25 DIAGNOSIS — C81.10 NODULAR SCLEROSING HODGKIN'S LYMPHOMA, UNSPECIFIED BODY REGION (H): ICD-10-CM

## 2019-10-25 DIAGNOSIS — C71.9 ASTROCYTOMA (H): ICD-10-CM

## 2019-10-25 DIAGNOSIS — L03.818 CELLULITIS OF OTHER SPECIFIED SITE: ICD-10-CM

## 2019-10-25 DIAGNOSIS — R00.0 SINUS TACHYCARDIA: ICD-10-CM

## 2019-10-25 DIAGNOSIS — E11.8 TYPE 2 DIABETES MELLITUS WITH COMPLICATION, WITH LONG-TERM CURRENT USE OF INSULIN (H): ICD-10-CM

## 2019-10-25 DIAGNOSIS — L73.9 FOLLICULITIS: ICD-10-CM

## 2019-10-25 PROCEDURE — 99316 NF DSCHRG MGMT 30 MIN+: CPT | Performed by: NURSE PRACTITIONER

## 2019-10-25 NOTE — LETTER
10/25/2019        RE: Shaun Patel  [address]  [facility] [address]        [facility] DISCHARGE SUMMARY  PATIENT'S NAME: Shaun Patel  YOB: 1935  MEDICAL RECORD NUMBER:  [id number]  Place of Service where encounter took place:  [facility] [[id number]]    PRIMARY CARE PROVIDER AND CLINIC RESPONSIBLE AFTER TRANSFER:   WILLIAM Daniel [facility] [address]  / [facility] [address]    Non-[facility] Provider     Transferring providers: CRISELDA Cohen CNP, Andriy Castellano MD  Recent Hospitalization/ED:  [facility]  stay 8/28/19 to 9/4/19  Date of SNF Admission: August / 04 / 2019  Date of SNF (anticipated) Discharge: October / 27 / 2019  Discharged to: previous independent home  Cognitive Scores: SLUMS: 22/30 and CPT: 5.2/5.6  DME: Walker    CODE STATUS/ADVANCE DIRECTIVES DISCUSSION:  DNR / DNI   ALLERGIES: Allopurinol; Simvastatin; and Sulfamethoxazole-trimethoprim    DISCHARGE DIAGNOSIS/NURSING FACILITY COURSE:   He initially came to this facility 8/6/2019 following hospitalization at [facility] with left groin abscess, s/p I&D 7/31/2019. He was treated with IV antibiotics and discharged on Augmentin. He had poor wound healing and was rehospitalized 8/28/2019 for I&D  by Dr Curtis. Cultures grew E coli, Proteus and Enterococcus. ID consulted and he was treated with broad spectrum antibiotics. Discharged on Cipro and Augmentin through 9/13/2019.  He was noted to have rising blood sugars and HgbA1c was 9.3. Basaglar  and Novolog were started for new diagnosis of diabetes type 2. Decadron dose was decreased.  He had rectal bleeding and Hgb dropped to the 9s. He declined GI work up, as he has done during previous hospitalizations. C nurse consulted and recommended bid wound care with Vashe moistened gauze, covered with ABD. He returned to the facility 9/4/2019 for ongoing rehab and medical management.      He has worked  with PHYSICAL THERAPY and OT with good progress and completed therapies several weeks ago. Ambulating 280 ft with walker and stand by to contact guard assist. Requires supervision with toileting and min assist with lower body dressing and bathing. TUG 26 seconds, indicating high fall risk.   He's had a prolonged tcu stay for wound care and unclear disposition. Recommendation is for AL, which he and his family have declined.   ASSESSMENT / PLAN:  (L02.214) Abscess of groin, left  (primary encounter diagnosis)  (L03.818) Cellulitis of other specified site  Comment: completed Augmentin and Cipro 9/13/2109 with no signs of recurrent infection. Wound continues to slowly heal. He was last seen at the [facility] 10/10/2019 and wound care was decreased to daily. He is medically stable for discharge .  Plan: discharge home with family assistance. Continue daily wound care using Vashe solution and light packing. Home care services and follow up as below.     (E11.8,  Z79.4) Type 2 diabetes mellitus with complication, with long-term current use of insulin (H)  Comment: new diagnosis. Insulin has been adjusted with improved control. Recent blood sugars are on the low range in the am: 65-90, higher later in the day at 144-232. He has been educated on checking blood sugars and self administration of insulin, and feels he will be able to manage this at home with assistance from home care.   Plan: increase am dose of Basaglar to 32 units and decrease pm dose to 10 units. Discontinue Novolog sliding scale to make his home regimen easier. Monitor blood sugars qid. Follow up with PCP.     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 Appears to be developing mild ascites over the past 2 weeks, although he doesn't think his abdomen has changed. This  "has been discussed with his daughter, who has an appointment scheduled with Oncology mid November 2019.   Plan: follow up with Dr Parry with CT scans, as scheduled by daughter. Continue decadron.     (R06.02) Shortness of breath  Comment: chronic and unchanged. No s/s of infection or acute process. CXR done 9/6/2019 showed chronic lung changes, no infiltrate, effusion or pulmonary edema. Daughter reports a recent \"normal\" cardiac work up. Suspect this may be related to his underlying malignancies   Plan: monitor respiratory status. Follow up with Oncology     (R00.0) Sinus tachycardia  Comment: rate controlled: 72-93  Plan: continue metoprolol     (D64.9) Anemia, unspecified type  Comment: chronic, improved with Hgb 13.1  Plan: follow up labs per Oncology and PCP    (R60.0) Bilateral leg edema  Comment: chronic and improved. No s/s of DVT  Plan: continue compression stockings and elevate legs. Continue lasix.     (L73.9) Folliculitis  Comment: he's had several pustules of his right groin in the past few weeks that appear to be related to folliculitis and ingrown hairs. One area was noted to be draining 10/22/2019 and due to his risk for recurrent infections, Augmentin was started. The area has improved with no drainage or erythema.   Plan: complete 7 day course of Augmentin. Keep area clean and dry, continue nystatin powder.     (R53.81) Physical deconditioning  Comment: progress in therapies as above.  Plan: home therapies for ongoing gait training, strengthening and ADL safety.       Past Medical History:  has a past medical history of Astrocytoma (H) (2017), Chronic anemia, CLL (chronic lymphocytic leukemia) (H) (2000), Focal seizure (H) (2017), GERD (gastroesophageal reflux disease), Liposarcoma of left shoulder (H), and Lymphoma (H) (2017).    Discharge Medications:  Current Outpatient Medications   Medication Sig Dispense Refill     acetaminophen (TYLENOL) 325 MG tablet Take 650 mg by mouth every 4 hours as " "needed for fever or pain       amoxicillin-clavulanate (AUGMENTIN) 875-125 MG tablet Take 1 tablet by mouth 2 times daily       dexamethasone (DECADRON) 2 MG tablet Take 2 mg by mouth 2 times daily (with meals)        FUROSEMIDE PO Take 20 mg by mouth every morning        insulin glargine (BASAGLAR KWIKPEN) 100 UNIT/ML pen Inject 32 Units Subcutaneous every morning        insulin glargine (BASAGLAR KWIKPEN) 100 UNIT/ML pen Inject 10 Units Subcutaneous At Bedtime        metoprolol succinate ER (TOPROL-XL) 25 MG 24 hr tablet Take 12.5 mg by mouth daily        nystatin (MYCOSTATIN) 366691 UNIT/GM external powder Apply topically 2 times daily        pantoprazole (PROTONIX) 20 MG EC tablet Take 40 mg by mouth daily       potassium chloride ER (K-DUR/KLOR-CON M) 10 MEQ CR tablet Take 10 mEq by mouth daily       Skin Protectants, Misc. (EUCERIN) cream Apply topically as needed for dry skin Apply to bilateral feet and LE topically one time a day for dry skin and Apply to bilateral feet and LE topically as needed for dry skin         Medication Changes/Rationale:     Augmentin and Cipro completed 9/13/2019    Augmentin resumed 10/22/2019 for 7 days for folliculitis/ingrown hair right groin    Basaglar insulin adjusted    Novolog sliding scale discontinued at tcu discharge    Additional lasix 20 mg daily given 9/20 and 9/21/2019 for shortness of breath.     Controlled medications sent with patient:   not applicable/none     ROS:   4 point ROS including Respiratory, CV, GI and , other than that noted in the HPI,  is negative    Physical Exam:   Vitals: BP (!) 131/90   Pulse 80   Temp 96.9  F (36.1  C)   Resp 20   Ht 1.803 m (5' 11\")   Wt 98.1 kg (216 lb 3.2 oz)   SpO2 98%   BMI 30.15 kg/m     BMI= Body mass index is 30.15 kg/m .  GENERAL APPEARANCE:  Alert, in no distress  ENT:  Mouth and posterior oropharynx normal, moist mucous membranes, [place]  EYES:  EOM normal, conjunctiva and lids normal  NECK:  No " adenopathy,masses or thyromegaly  RESP:  respiratory effort and palpation of chest normal, lungs clear to auscultation , no respiratory distress  CV:  Palpation and auscultation of heart done , regular rate and rhythm, no  murmur, +2 pedal pulses, peripheral edema  trace  in both LE  ABDOMEN: soft, nontender, mild distension,  no  masses  M/S:   gait steady with walker.  MUHAMMAD with good strength. No joint inflammation  SKIN: left groin wound clean with granulation and small areas of slough, mild induration. No erythema, drainage or follicle  inflammation right groin.   PSYCH:  oriented X 3, memory impaired , affect and mood normal    SNF labs: Labs done in SNF are in [place] EPIC. Please refer to them using EPIC/Care Everywhere.      DISCHARGE PLAN:    Follow up labs: per PCP and Oncology     Medical Follow Up:      Follow up with primary care provider within 1 week    Follow up with Oncology as scheduled by daughter    Follow up with [facility] 11/4/2019    MTM referral needed and placed by this provider: No    Discharge Services: Home Care:  Occupational Therapy, Physical Therapy, Registered Nurse, Home Health Aide and SW From:  [facility]    Discharge Instructions Verbalized to Patient at Discharge:     Monitor blood sugars qid    Wound care as instructed (he will have daily nurse visits for wound care)      TOTAL DISCHARGE TIME:   Greater than 30 minutes  Electronically signed by:  CRISELDA Cohen CNP                   Documentation of Face to Face and Certification for Home Health Services    I certify that patient: Shaun Patel is under my care and that I, or a nurse practitioner or physician's assistant working with me, had a face-to-face encounter that meets the physician face-to-face encounter requirements with this patient on: 10/25/2019.    This encounter with the patient was in whole, or in part, for the following medical condition, which is the primary reason for home health care: left  groin abscess.    I certify that, based on my findings, the following services are medically necessary home health services: Nursing, Occupational Therapy, Physical Therapy, Social Work and HHA.    My clinical findings support the need for the above services because: Nurse is needed: To assess VS, blood sugars, edema, wound  after changes in medications or other medical regimen., To provide assessment and oversight required in the home to assure adherence to the medical plan due to: complex medication regimen, high risk for rehospitalization. and To provide caregiver training to assist with: med management, wound care.., Occupational Therapy Services are needed to assess and treat cognitive ability and address ADL safety due to impairment in functional status., Physical Therapy Services are needed to assess and treat the following functional impairments: gait instabiltiy, fall risk, limited endurance. and SW to assess home situation, need for placement    Further, I certify that my clinical findings support that this patient is homebound (i.e. absences from home require considerable and taxing effort and are for medical reasons or Taoist services or infrequently or of short duration when for other reasons) because: Requires assistance of another person or specialized equipment to access medical services because patient: Is unable to exit home safely on own due to: gait instability, fall risk, limited endurance., Is unable to operate assistive equipment on their own., Is unable to walk greater than 280 feet without rest. and Requires supervision of another for safe transfer...    Based on the above findings. I certify that this patient is confined to the home and needs intermittent skilled nursing care, physical therapy and/or speech therapy.  The patient is under my care, and I have initiated the establishment of the plan of care.  This patient will be followed by a physician who will periodically review the plan  of care.  Dr.Mohammad Nona MD, signing F2F and only signing for initial order. Please send all follow up questions and concerns or needed follow up signatures to the PCP, who [place] [facility] has on file as:  Jazmine Rachel     Physician Signature: See electronic signature associated with these discharge orders.  Date: 10/27/2019        Sincerely,        CRISELDA Cohen CNP

## 2019-10-27 NOTE — PROGRESS NOTES
Documentation of Face to Face and Certification for Home Health Services    I certify that patient: Shaun Patel is under my care and that I, or a nurse practitioner or physician's assistant working with me, had a face-to-face encounter that meets the physician face-to-face encounter requirements with this patient on: 10/25/2019.    This encounter with the patient was in whole, or in part, for the following medical condition, which is the primary reason for home health care: left groin abscess.    I certify that, based on my findings, the following services are medically necessary home health services: Nursing, Occupational Therapy, Physical Therapy, Social Work and HHA.    My clinical findings support the need for the above services because: Nurse is needed: To assess VS, blood sugars, edema, wound  after changes in medications or other medical regimen., To provide assessment and oversight required in the home to assure adherence to the medical plan due to: complex medication regimen, high risk for rehospitalization. and To provide caregiver training to assist with: med management, wound care.., Occupational Therapy Services are needed to assess and treat cognitive ability and address ADL safety due to impairment in functional status., Physical Therapy Services are needed to assess and treat the following functional impairments: gait instabiltiy, fall risk, limited endurance. and SW to assess home situation, need for placement    Further, I certify that my clinical findings support that this patient is homebound (i.e. absences from home require considerable and taxing effort and are for medical reasons or Yazdanism services or infrequently or of short duration when for other reasons) because: Requires assistance of another person or specialized equipment to access medical services because patient: Is unable to exit home safely on own due to: gait instability, fall risk, limited endurance., Is unable to operate  assistive equipment on their own., Is unable to walk greater than 280 feet without rest. and Requires supervision of another for safe transfer...    Based on the above findings. I certify that this patient is confined to the home and needs intermittent skilled nursing care, physical therapy and/or speech therapy.  The patient is under my care, and I have initiated the establishment of the plan of care.  This patient will be followed by a physician who will periodically review the plan of care.  Dr.Mohammad Nona MD, signing F2F and only signing for initial order. Please send all follow up questions and concerns or needed follow up signatures to the PCP, who [facility] has on file as:  Jazmine Rachel     Physician Signature: See electronic signature associated with these discharge orders.  Date: 10/27/2019

## 2024-04-11 NOTE — PROGRESS NOTES
[facility]  [facility] Medical Record Number:  [id number]  Place of Service where encounter took place:  [facility] [[id number]]  Chief Complaint   Patient presents with     RECHECK       HPI:    Shaun Patel  is a 83 year old (1935), who is being seen today for an episodic care visit.  HPI information obtained from: facility chart records, facility staff, patient report and [facility] chart review.   He came to this facility 8/6/2019 from [facility] for short term rehab and medical management following hospitalization after presenting to the ED with left groin pain. He was septic and found to have left  inguinal abscess and cellulitis. Urology consulted and he underwent I&D 7/31/2019 by Dr Bauer. Cultures grew Enterococcus faecalis, Enterococcus species and E coli. ID consulted. He was treated with IV vancomycin,  cefepime and flagyl, then changed to Unasyn. Discharged on Augmentin for 9 days.   GI consulted for rectal bleeding and history of hemorrhoids. Patient declined work up and Hgb remained stable in the 9s.   His oncologist, Dr Parry, consulted and his note indicates that none of the patient's cancers are currently active.     Today's concern is:     Abscess of groin, left-reports feeling well with improved strength. Minimal pain. Afebrile.   Cellulitis of other specified site  Anemia, unspecified type  Rectal bleeding-denies signs of bleeding.   Shortness of breath-reports his shortness of breath has improved. No cough or chest pain.   Bilateral leg edema  CLL (chronic lymphocytic leukemia) (H)  Nodular sclerosing Hodgkin's lymphoma, unspecified body region (H)  Astrocytoma (H)  Sinus tachycardia-HR: 87-96   BPs: 119/78, 134/84, 122/79  Oral thrush-reports pain of his mouth and is concerned that he's getting thrush, which he's had in the past  Physical deconditioning-ambulating short distances with walker and assist of 1. Requires assist of 1 with transfers and  "cares.     Past Medical and Surgical History reviewed in Epic today.    MEDICATIONS:  Current Outpatient Medications   Medication Sig Dispense Refill     amoxicillin-clavulanate (AUGMENTIN) 875-125 MG tablet Take 1 tablet by mouth 2 times daily Take with meals for 9 days       dexamethasone (DECADRON) 2 MG tablet Take 2 mg by mouth 3 times daily       FAMOTIDINE PO Take 10 mg by mouth 2 times daily        FUROSEMIDE PO Take 20 mg by mouth daily       metoprolol succinate ER (TOPROL-XL) 25 MG 24 hr tablet Take 12.5 mg by mouth daily Take 0.5 tablets by mouth once daily       nystatin (MYCOSTATIN) 954163 UNIT/GM external powder Apply topically 2 times daily For Cellulitis of scrotum. Apply topically to affected area(s) 2 times daily.         REVIEW OF SYSTEMS:  4 point ROS including Respiratory, CV, GI and , other than that noted in the HPI,  is negative    Objective:  BP (!) 136/90   Pulse 114   Temp 98.4  F (36.9  C)   Resp 16   Ht 1.803 m (5' 11\")   Wt 95.7 kg (211 lb)   SpO2 98%   BMI 29.43 kg/m    Exam:  GENERAL APPEARANCE:  Alert, in no distress  ENT:  few small white patches on oral mucosa, open sore left side of tongue, moist mucous membranes, [place]  EYES:  EOM normal, conjunctiva and lids normal, PERRL  NECK:  No adenopathy,masses or thyromegaly  RESP:  respiratory effort and palpation of chest normal, lungs clear to auscultation , no respiratory distress  CV:  Palpation and auscultation of heart done , regular rate and rhythm, no  murmur, +2 pedal pulses, peripheral edema 1 + in both LE  ABDOMEN:   soft, nontender, no hepatosplenomegaly or other masses  M/S:  gait slow with walker.  MUHAMMAD with good strength. No joint inflammation  SKIN:  left groin wound approx 4 x 4 cm, dry with partial slough, no drainage, mild induration. Fading bruising across lower abdomen   PSYCH:  oriented X 3, memory impaired , affect and mood normal    Labs:   Labs done in SNF are in [place] EPIC. Please refer to them using " [facility]/Care Everywhere.    ASSESSMENT / PLAN:  (L02.214) Abscess of groin, left  (primary encounter diagnosis)  (L03.818) Cellulitis of other specified site  Comment: infection appears to be improving and indurated area is softer.  Wound bed is dry.   Plan: discontinue iodoform and start hydrogel with foam dressing every other day. Complete course of Augmentin 8/15/2019. Follow up with Dr Bauer if wound does not improve.     (D64.9) Anemia, unspecified type  Comment: acute on chronic. Hgb improved    Plan: follow Hgb    (K62.5) Rectal bleeding  Comment: no signs of active bleeding   Plan: monitor. He declined further work up.     (R06.02) Shortness of breath  Comment: improving. Multifactorial with recent pneumonia, volume overload and deconditioning all contributing.   Plan: continue lasix. Closely monitor respiratory status. Repeat CXR if not improved. Encourage IS     (R60.0) Bilateral leg edema  Comment: improving   Plan: continue lasix. Compression stockings. Follow BMP    (C91.90) CLL (chronic lymphocytic leukemia) (H)  (C81.10) Nodular sclerosing Hodgkin's lymphoma, unspecified body region (H)  (C71.9) Astrocytoma (H)  Comment: no acute issues. Blood counts are stable  Plan: continue decadron for brain tumor. Follow up with Dr Parry per usual schedule.     (R00.0) Sinus tachycardia  Comment: rate controlled. No hypotension   Plan: continue metoprolol. Monitor VS     (B37.0) Oral thrush  Comment: mild. At risk for recurrent thrush due to decadron use and chronic immunosuppression   Plan: nystatin solution for 7 days. Good oral hygiene.     (R53.81) Physical deconditioning  Comment: progressing in therapies  Plan: continue PHYSICAL THERAPY/OT. Goal is to return home with his wife and services.         Electronically signed by  CRISELDA Cohen CNP            [place]